# Patient Record
Sex: MALE | Race: WHITE | NOT HISPANIC OR LATINO | URBAN - METROPOLITAN AREA
[De-identification: names, ages, dates, MRNs, and addresses within clinical notes are randomized per-mention and may not be internally consistent; named-entity substitution may affect disease eponyms.]

---

## 2022-03-02 ENCOUNTER — INPATIENT (INPATIENT)
Facility: HOSPITAL | Age: 77
LOS: 7 days | Discharge: NURSING, REHAB OR LONG-TERM CARE | DRG: 319 | End: 2022-03-10
Attending: THORACIC SURGERY (CARDIOTHORACIC VASCULAR SURGERY) | Admitting: THORACIC SURGERY (CARDIOTHORACIC VASCULAR SURGERY)
Payer: MEDICARE

## 2022-03-02 VITALS
SYSTOLIC BLOOD PRESSURE: 134 MMHG | OXYGEN SATURATION: 96 % | DIASTOLIC BLOOD PRESSURE: 81 MMHG | HEIGHT: 72 IN | TEMPERATURE: 97 F | HEART RATE: 72 BPM | RESPIRATION RATE: 18 BRPM | WEIGHT: 208.56 LBS

## 2022-03-02 DIAGNOSIS — M19.09 PRIMARY OSTEOARTHRITIS, OTHER SPECIFIED SITE: ICD-10-CM

## 2022-03-02 DIAGNOSIS — R33.9 RETENTION OF URINE, UNSPECIFIED: ICD-10-CM

## 2022-03-02 DIAGNOSIS — F32.A DEPRESSION, UNSPECIFIED: ICD-10-CM

## 2022-03-02 DIAGNOSIS — Z79.84 LONG TERM (CURRENT) USE OF ORAL HYPOGLYCEMIC DRUGS: ICD-10-CM

## 2022-03-02 DIAGNOSIS — I35.0 NONRHEUMATIC AORTIC (VALVE) STENOSIS: ICD-10-CM

## 2022-03-02 DIAGNOSIS — G11.11 FRIEDREICH ATAXIA: ICD-10-CM

## 2022-03-02 DIAGNOSIS — E78.5 HYPERLIPIDEMIA, UNSPECIFIED: ICD-10-CM

## 2022-03-02 DIAGNOSIS — M48.56XA COLLAPSED VERTEBRA, NOT ELSEWHERE CLASSIFIED, LUMBAR REGION, INITIAL ENCOUNTER FOR FRACTURE: ICD-10-CM

## 2022-03-02 DIAGNOSIS — R47.1 DYSARTHRIA AND ANARTHRIA: ICD-10-CM

## 2022-03-02 DIAGNOSIS — I63.549 CEREBRAL INFARCTION DUE TO UNSPECIFIED OCCLUSION OR STENOSIS OF UNSPECIFIED CEREBELLAR ARTERY: ICD-10-CM

## 2022-03-02 DIAGNOSIS — I25.10 ATHEROSCLEROTIC HEART DISEASE OF NATIVE CORONARY ARTERY WITHOUT ANGINA PECTORIS: ICD-10-CM

## 2022-03-02 DIAGNOSIS — Z79.82 LONG TERM (CURRENT) USE OF ASPIRIN: ICD-10-CM

## 2022-03-02 DIAGNOSIS — Z99.89 DEPENDENCE ON OTHER ENABLING MACHINES AND DEVICES: ICD-10-CM

## 2022-03-02 DIAGNOSIS — M79.81 NONTRAUMATIC HEMATOMA OF SOFT TISSUE: ICD-10-CM

## 2022-03-02 DIAGNOSIS — Z87.891 PERSONAL HISTORY OF NICOTINE DEPENDENCE: ICD-10-CM
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
## 2022-03-02 DIAGNOSIS — Z92.3 PERSONAL HISTORY OF IRRADIATION: ICD-10-CM

## 2022-03-02 DIAGNOSIS — I50.32 CHRONIC DIASTOLIC (CONGESTIVE) HEART FAILURE: ICD-10-CM

## 2022-03-02 DIAGNOSIS — M21.372 FOOT DROP, LEFT FOOT: ICD-10-CM

## 2022-03-02 DIAGNOSIS — I95.81 POSTPROCEDURAL HYPOTENSION: ICD-10-CM

## 2022-03-02 DIAGNOSIS — Z85.46 PERSONAL HISTORY OF MALIGNANT NEOPLASM OF PROSTATE: ICD-10-CM

## 2022-03-02 DIAGNOSIS — R00.1 BRADYCARDIA, UNSPECIFIED: ICD-10-CM

## 2022-03-02 DIAGNOSIS — R29.704 NIHSS SCORE 4: ICD-10-CM

## 2022-03-02 DIAGNOSIS — M48.061 SPINAL STENOSIS, LUMBAR REGION WITHOUT NEUROGENIC CLAUDICATION: ICD-10-CM
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
## 2022-03-02 DIAGNOSIS — I11.0 HYPERTENSIVE HEART DISEASE WITH HEART FAILURE: ICD-10-CM

## 2022-03-02 DIAGNOSIS — E11.9 TYPE 2 DIABETES MELLITUS WITHOUT COMPLICATIONS: ICD-10-CM

## 2022-03-02 DIAGNOSIS — I25.2 OLD MYOCARDIAL INFARCTION: ICD-10-CM

## 2022-03-02 DIAGNOSIS — Z95.5 PRESENCE OF CORONARY ANGIOPLASTY IMPLANT AND GRAFT: ICD-10-CM

## 2022-03-02 DIAGNOSIS — G47.33 OBSTRUCTIVE SLEEP APNEA (ADULT) (PEDIATRIC): ICD-10-CM

## 2022-03-02 DIAGNOSIS — I25.84 CORONARY ATHEROSCLEROSIS DUE TO CALCIFIED CORONARY LESION: ICD-10-CM

## 2022-03-02 DIAGNOSIS — F41.9 ANXIETY DISORDER, UNSPECIFIED: ICD-10-CM

## 2022-03-02 DIAGNOSIS — I97.418 INTRAOPERATIVE HEMORRHAGE AND HEMATOMA OF A CIRCULATORY SYSTEM ORGAN OR STRUCTURE COMPLICATING OTHER CIRCULATORY SYSTEM PROCEDURE: ICD-10-CM

## 2022-03-02 DIAGNOSIS — I72.3 ANEURYSM OF ILIAC ARTERY: ICD-10-CM

## 2022-03-02 LAB
A1C WITH ESTIMATED AVERAGE GLUCOSE RESULT: 6.3 % — HIGH (ref 4–5.6)
ALBUMIN SERPL ELPH-MCNC: 3.9 G/DL — SIGNIFICANT CHANGE UP (ref 3.3–5)
ALP SERPL-CCNC: 62 U/L — SIGNIFICANT CHANGE UP (ref 40–120)
ALT FLD-CCNC: 39 U/L — SIGNIFICANT CHANGE UP (ref 10–45)
ANION GAP SERPL CALC-SCNC: 10 MMOL/L — SIGNIFICANT CHANGE UP (ref 5–17)
APTT BLD: 26.7 SEC — LOW (ref 27.5–35.5)
AST SERPL-CCNC: 40 U/L — SIGNIFICANT CHANGE UP (ref 10–40)
BASOPHILS # BLD AUTO: 0.01 K/UL — SIGNIFICANT CHANGE UP (ref 0–0.2)
BASOPHILS NFR BLD AUTO: 0.3 % — SIGNIFICANT CHANGE UP (ref 0–2)
BILIRUB SERPL-MCNC: 0.6 MG/DL — SIGNIFICANT CHANGE UP (ref 0.2–1.2)
BLD GP AB SCN SERPL QL: NEGATIVE — SIGNIFICANT CHANGE UP
BUN SERPL-MCNC: 22 MG/DL — SIGNIFICANT CHANGE UP (ref 7–23)
CALCIUM SERPL-MCNC: 8.8 MG/DL — SIGNIFICANT CHANGE UP (ref 8.4–10.5)
CHLORIDE SERPL-SCNC: 104 MMOL/L — SIGNIFICANT CHANGE UP (ref 96–108)
CO2 SERPL-SCNC: 25 MMOL/L — SIGNIFICANT CHANGE UP (ref 22–31)
CREAT SERPL-MCNC: 0.95 MG/DL — SIGNIFICANT CHANGE UP (ref 0.5–1.3)
EGFR: 82 ML/MIN/1.73M2 — SIGNIFICANT CHANGE UP
EOSINOPHIL # BLD AUTO: 0.17 K/UL — SIGNIFICANT CHANGE UP (ref 0–0.5)
EOSINOPHIL NFR BLD AUTO: 4.8 % — SIGNIFICANT CHANGE UP (ref 0–6)
ESTIMATED AVERAGE GLUCOSE: 134 MG/DL — HIGH (ref 68–114)
GLUCOSE BLDC GLUCOMTR-MCNC: 142 MG/DL — HIGH (ref 70–99)
GLUCOSE BLDC GLUCOMTR-MCNC: 163 MG/DL — HIGH (ref 70–99)
GLUCOSE BLDC GLUCOMTR-MCNC: 166 MG/DL — HIGH (ref 70–99)
GLUCOSE BLDC GLUCOMTR-MCNC: 175 MG/DL — HIGH (ref 70–99)
GLUCOSE SERPL-MCNC: 162 MG/DL — HIGH (ref 70–99)
HCT VFR BLD CALC: 35.9 % — LOW (ref 39–50)
HGB BLD-MCNC: 12.5 G/DL — LOW (ref 13–17)
IMM GRANULOCYTES NFR BLD AUTO: 0.6 % — SIGNIFICANT CHANGE UP (ref 0–1.5)
INR BLD: 1.09 — SIGNIFICANT CHANGE UP (ref 0.88–1.16)
LYMPHOCYTES # BLD AUTO: 0.78 K/UL — LOW (ref 1–3.3)
LYMPHOCYTES # BLD AUTO: 22 % — SIGNIFICANT CHANGE UP (ref 13–44)
MCHC RBC-ENTMCNC: 31.4 PG — SIGNIFICANT CHANGE UP (ref 27–34)
MCHC RBC-ENTMCNC: 34.8 GM/DL — SIGNIFICANT CHANGE UP (ref 32–36)
MCV RBC AUTO: 90.2 FL — SIGNIFICANT CHANGE UP (ref 80–100)
MONOCYTES # BLD AUTO: 0.41 K/UL — SIGNIFICANT CHANGE UP (ref 0–0.9)
MONOCYTES NFR BLD AUTO: 11.6 % — SIGNIFICANT CHANGE UP (ref 2–14)
NEUTROPHILS # BLD AUTO: 2.15 K/UL — SIGNIFICANT CHANGE UP (ref 1.8–7.4)
NEUTROPHILS NFR BLD AUTO: 60.7 % — SIGNIFICANT CHANGE UP (ref 43–77)
NRBC # BLD: 0 /100 WBCS — SIGNIFICANT CHANGE UP (ref 0–0)
NT-PROBNP SERPL-SCNC: 869 PG/ML — HIGH (ref 0–300)
PLATELET # BLD AUTO: 157 K/UL — SIGNIFICANT CHANGE UP (ref 150–400)
POTASSIUM SERPL-MCNC: 4.3 MMOL/L — SIGNIFICANT CHANGE UP (ref 3.5–5.3)
POTASSIUM SERPL-SCNC: 4.3 MMOL/L — SIGNIFICANT CHANGE UP (ref 3.5–5.3)
PROT SERPL-MCNC: 6 G/DL — SIGNIFICANT CHANGE UP (ref 6–8.3)
PROTHROM AB SERPL-ACNC: 13 SEC — SIGNIFICANT CHANGE UP (ref 10.5–13.4)
RBC # BLD: 3.98 M/UL — LOW (ref 4.2–5.8)
RBC # FLD: 13.2 % — SIGNIFICANT CHANGE UP (ref 10.3–14.5)
RH IG SCN BLD-IMP: POSITIVE — SIGNIFICANT CHANGE UP
RH IG SCN BLD-IMP: POSITIVE — SIGNIFICANT CHANGE UP
SARS-COV-2 RNA SPEC QL NAA+PROBE: SIGNIFICANT CHANGE UP
SODIUM SERPL-SCNC: 139 MMOL/L — SIGNIFICANT CHANGE UP (ref 135–145)
TSH SERPL-MCNC: 2.27 UIU/ML — SIGNIFICANT CHANGE UP (ref 0.27–4.2)
WBC # BLD: 3.54 K/UL — LOW (ref 3.8–10.5)
WBC # FLD AUTO: 3.54 K/UL — LOW (ref 3.8–10.5)

## 2022-03-02 PROCEDURE — 99232 SBSQ HOSP IP/OBS MODERATE 35: CPT

## 2022-03-02 PROCEDURE — 93880 EXTRACRANIAL BILAT STUDY: CPT | Mod: 26

## 2022-03-02 PROCEDURE — 75573 CT HRT C+ STRUX CGEN HRT DS: CPT | Mod: 26

## 2022-03-02 PROCEDURE — 74174 CTA ABD&PLVS W/CONTRAST: CPT | Mod: 26

## 2022-03-02 PROCEDURE — 93306 TTE W/DOPPLER COMPLETE: CPT | Mod: 26

## 2022-03-02 PROCEDURE — 70450 CT HEAD/BRAIN W/O DYE: CPT | Mod: 26

## 2022-03-02 PROCEDURE — 71045 X-RAY EXAM CHEST 1 VIEW: CPT | Mod: 26

## 2022-03-02 PROCEDURE — 93010 ELECTROCARDIOGRAM REPORT: CPT

## 2022-03-02 RX ORDER — TAMSULOSIN HYDROCHLORIDE 0.4 MG/1
0.4 CAPSULE ORAL AT BEDTIME
Refills: 0 | Status: DISCONTINUED | OUTPATIENT
Start: 2022-03-02 | End: 2022-03-04

## 2022-03-02 RX ORDER — INSULIN LISPRO 100/ML
VIAL (ML) SUBCUTANEOUS
Refills: 0 | Status: DISCONTINUED | OUTPATIENT
Start: 2022-03-02 | End: 2022-03-04

## 2022-03-02 RX ORDER — ATORVASTATIN CALCIUM 80 MG/1
80 TABLET, FILM COATED ORAL AT BEDTIME
Refills: 0 | Status: DISCONTINUED | OUTPATIENT
Start: 2022-03-02 | End: 2022-03-04

## 2022-03-02 RX ORDER — SODIUM CHLORIDE 9 MG/ML
1000 INJECTION, SOLUTION INTRAVENOUS
Refills: 0 | Status: DISCONTINUED | OUTPATIENT
Start: 2022-03-02 | End: 2022-03-04

## 2022-03-02 RX ORDER — MIRTAZAPINE 45 MG/1
7.5 TABLET, ORALLY DISINTEGRATING ORAL AT BEDTIME
Refills: 0 | Status: DISCONTINUED | OUTPATIENT
Start: 2022-03-02 | End: 2022-03-04

## 2022-03-02 RX ORDER — GLUCAGON INJECTION, SOLUTION 0.5 MG/.1ML
1 INJECTION, SOLUTION SUBCUTANEOUS ONCE
Refills: 0 | Status: DISCONTINUED | OUTPATIENT
Start: 2022-03-02 | End: 2022-03-04

## 2022-03-02 RX ORDER — DEXTROSE 50 % IN WATER 50 %
12.5 SYRINGE (ML) INTRAVENOUS ONCE
Refills: 0 | Status: DISCONTINUED | OUTPATIENT
Start: 2022-03-02 | End: 2022-03-04

## 2022-03-02 RX ORDER — DEXTROSE 50 % IN WATER 50 %
25 SYRINGE (ML) INTRAVENOUS ONCE
Refills: 0 | Status: DISCONTINUED | OUTPATIENT
Start: 2022-03-02 | End: 2022-03-04

## 2022-03-02 RX ORDER — DULOXETINE HYDROCHLORIDE 30 MG/1
20 CAPSULE, DELAYED RELEASE ORAL DAILY
Refills: 0 | Status: DISCONTINUED | OUTPATIENT
Start: 2022-03-02 | End: 2022-03-04

## 2022-03-02 RX ORDER — DEXTROSE 50 % IN WATER 50 %
15 SYRINGE (ML) INTRAVENOUS ONCE
Refills: 0 | Status: DISCONTINUED | OUTPATIENT
Start: 2022-03-02 | End: 2022-03-04

## 2022-03-02 RX ORDER — SODIUM CHLORIDE 9 MG/ML
1000 INJECTION, SOLUTION INTRAVENOUS
Refills: 0 | Status: DISCONTINUED | OUTPATIENT
Start: 2022-03-02 | End: 2022-03-02

## 2022-03-02 RX ORDER — HEPARIN SODIUM 5000 [USP'U]/ML
5000 INJECTION INTRAVENOUS; SUBCUTANEOUS EVERY 8 HOURS
Refills: 0 | Status: DISCONTINUED | OUTPATIENT
Start: 2022-03-02 | End: 2022-03-04

## 2022-03-02 RX ORDER — ASPIRIN/CALCIUM CARB/MAGNESIUM 324 MG
81 TABLET ORAL DAILY
Refills: 0 | Status: DISCONTINUED | OUTPATIENT
Start: 2022-03-02 | End: 2022-03-04

## 2022-03-02 RX ORDER — ESCITALOPRAM OXALATE 10 MG/1
10 TABLET, FILM COATED ORAL DAILY
Refills: 0 | Status: DISCONTINUED | OUTPATIENT
Start: 2022-03-02 | End: 2022-03-04

## 2022-03-02 RX ORDER — SODIUM CHLORIDE 9 MG/ML
3 INJECTION INTRAMUSCULAR; INTRAVENOUS; SUBCUTANEOUS EVERY 8 HOURS
Refills: 0 | Status: DISCONTINUED | OUTPATIENT
Start: 2022-03-02 | End: 2022-03-04

## 2022-03-02 RX ORDER — PANTOPRAZOLE SODIUM 20 MG/1
40 TABLET, DELAYED RELEASE ORAL
Refills: 0 | Status: DISCONTINUED | OUTPATIENT
Start: 2022-03-02 | End: 2022-03-04

## 2022-03-02 RX ORDER — MIRTAZAPINE 45 MG/1
2 TABLET, ORALLY DISINTEGRATING ORAL
Qty: 0 | Refills: 0 | DISCHARGE

## 2022-03-02 RX ADMIN — TAMSULOSIN HYDROCHLORIDE 0.4 MILLIGRAM(S): 0.4 CAPSULE ORAL at 21:22

## 2022-03-02 RX ADMIN — Medication 2: at 17:39

## 2022-03-02 RX ADMIN — MIRTAZAPINE 7.5 MILLIGRAM(S): 45 TABLET, ORALLY DISINTEGRATING ORAL at 22:30

## 2022-03-02 RX ADMIN — HEPARIN SODIUM 5000 UNIT(S): 5000 INJECTION INTRAVENOUS; SUBCUTANEOUS at 21:22

## 2022-03-02 RX ADMIN — HEPARIN SODIUM 5000 UNIT(S): 5000 INJECTION INTRAVENOUS; SUBCUTANEOUS at 13:42

## 2022-03-02 RX ADMIN — Medication 2: at 08:08

## 2022-03-02 RX ADMIN — Medication 81 MILLIGRAM(S): at 12:47

## 2022-03-02 RX ADMIN — DULOXETINE HYDROCHLORIDE 20 MILLIGRAM(S): 30 CAPSULE, DELAYED RELEASE ORAL at 17:45

## 2022-03-02 RX ADMIN — SODIUM CHLORIDE 75 MILLILITER(S): 9 INJECTION, SOLUTION INTRAVENOUS at 04:30

## 2022-03-02 RX ADMIN — PANTOPRAZOLE SODIUM 40 MILLIGRAM(S): 20 TABLET, DELAYED RELEASE ORAL at 07:01

## 2022-03-02 RX ADMIN — Medication 2: at 21:34

## 2022-03-02 RX ADMIN — SODIUM CHLORIDE 3 MILLILITER(S): 9 INJECTION INTRAMUSCULAR; INTRAVENOUS; SUBCUTANEOUS at 13:30

## 2022-03-02 RX ADMIN — ESCITALOPRAM OXALATE 10 MILLIGRAM(S): 10 TABLET, FILM COATED ORAL at 12:47

## 2022-03-02 RX ADMIN — SODIUM CHLORIDE 3 MILLILITER(S): 9 INJECTION INTRAMUSCULAR; INTRAVENOUS; SUBCUTANEOUS at 06:47

## 2022-03-02 RX ADMIN — HEPARIN SODIUM 5000 UNIT(S): 5000 INJECTION INTRAVENOUS; SUBCUTANEOUS at 07:00

## 2022-03-02 RX ADMIN — ATORVASTATIN CALCIUM 80 MILLIGRAM(S): 80 TABLET, FILM COATED ORAL at 21:22

## 2022-03-02 RX ADMIN — SODIUM CHLORIDE 3 MILLILITER(S): 9 INJECTION INTRAMUSCULAR; INTRAVENOUS; SUBCUTANEOUS at 21:32

## 2022-03-02 NOTE — PHYSICAL THERAPY INITIAL EVALUATION ADULT - PERTINENT HX OF CURRENT PROBLEM, REHAB EVAL
76M  goes to physical therapy regularly and has had nerve blocks for his back pain in the past, who presented to [facility] because while at one of his physical therapy sessions he began to "feel faint" and was told to sit down in a chair that was brought to him, but he didn't make it in time and "fell on the floor and hit his face".. While at [place] he was found to have severe AS and CT Angio.  He was then transferred to [facility]

## 2022-03-02 NOTE — PROGRESS NOTE ADULT - SUBJECTIVE AND OBJECTIVE BOX
Patient discussed on morning rounds with Dr. Tirado    Operation / Date: Possible TAVR 3/4    SUBJECTIVE ASSESSMENT:  76y Male seen and examined at bedside. No events overnight. Feeling well. Denies CP/SOB/N/V/D/dizziness/cough/fever/chills.      Vital Signs Last 24 Hrs  T(C): 36.7 (02 Mar 2022 09:24), Max: 36.7 (02 Mar 2022 09:24)  T(F): 98 (02 Mar 2022 09:24), Max: 98 (02 Mar 2022 09:24)  HR: 72 (02 Mar 2022 12:12) (64 - 72)  BP: 134/80 (02 Mar 2022 12:12) (130/71 - 166/92)  BP(mean): 100 (02 Mar 2022 12:12) (95 - 123)  RR: 16 (02 Mar 2022 06:34) (16 - 18)  SpO2: 97% (02 Mar 2022 12:12) (96% - 97%)  I&O's Detail    01 Mar 2022 07:01  -  02 Mar 2022 07:00  --------------------------------------------------------  IN:    Lactated Ringers: 75 mL  Total IN: 75 mL    OUT:  Total OUT: 0 mL    Total NET: 75 mL          CHEST TUBE:  NO  LIVE DRAIN:  NO  EPICARDIAL WIRES: NO  TIE DOWNS: NO  FREIRE: NO      PHYSICAL EXAM:  GEN: NAD, looks comfortable  Psych: Mood appropriate  Neuro: A&Ox3.  No focal deficits.  Moving all extremities.   HEENT: No obvious abnormalities  CV: S1S2, regular, + systolic murmur RUSB.  No carotid bruits.  No JVD  Lungs: Clear B/L.  No wheezing, rales or rhonchi  ABD: Soft, non-tender, non-distended.  +Bowel sounds  EXT: Warm and well perfused.  No peripheral edema noted  Musculoskeletal: Moving all extremities with normal ROM, no joint swelling  PV: Pedal pulses palpable      LABS:                        12.5   3.54  )-----------( 157      ( 02 Mar 2022 04:08 )             35.9       COUMADIN:  NO    PT/INR - ( 02 Mar 2022 04:08 )   PT: 13.0 sec;   INR: 1.09          PTT - ( 02 Mar 2022 04:08 )  PTT:26.7 sec    03-02    139  |  104  |  22  ----------------------------<  162<H>  4.3   |  25  |  0.95    Ca    8.8      02 Mar 2022 04:08    TPro  6.0  /  Alb  3.9  /  TBili  0.6  /  DBili  x   /  AST  40  /  ALT  39  /  AlkPhos  62  03-02          MEDICATIONS  (STANDING):  aspirin enteric coated 81 milliGRAM(s) Oral daily  atorvastatin 80 milliGRAM(s) Oral at bedtime  dextrose 40% Gel 15 Gram(s) Oral once  dextrose 5%. 1000 milliLiter(s) (50 mL/Hr) IV Continuous <Continuous>  dextrose 5%. 1000 milliLiter(s) (100 mL/Hr) IV Continuous <Continuous>  dextrose 50% Injectable 25 Gram(s) IV Push once  dextrose 50% Injectable 12.5 Gram(s) IV Push once  dextrose 50% Injectable 25 Gram(s) IV Push once  escitalopram 10 milliGRAM(s) Oral daily  glucagon  Injectable 1 milliGRAM(s) IntraMuscular once  heparin   Injectable 5000 Unit(s) SubCutaneous every 8 hours  insulin lispro (ADMELOG) corrective regimen sliding scale   SubCutaneous Before meals and at bedtime  lactated ringers. 1000 milliLiter(s) (75 mL/Hr) IV Continuous <Continuous>  mirtazapine 7.5 milliGRAM(s) Oral at bedtime  pantoprazole    Tablet 40 milliGRAM(s) Oral before breakfast  sodium chloride 0.9% lock flush 3 milliLiter(s) IV Push every 8 hours  tamsulosin 0.4 milliGRAM(s) Oral at bedtime    MEDICATIONS  (PRN):        RADIOLOGY & ADDITIONAL TESTS:  TTE 3/2/22  CONCLUSIONS:   1. Technically difficult study.   2. Normal left ventricular size and systolic function.   3. Normal right ventricular size and systolic function.   4. Normal atria.   5. Severe aortic stenosis. The peak transvalvular velocity is 5.00 m/s,   the mean transvalvular gradient is 65.00 mmHg, and the LVOT/AV velocity   ratio is 0.17.   6. No other significant valvular disease.   7. No evidence of pulmonary hypertension.   8. Trivial pericardial effusion without echocardiographic evidence of   cardiac tamponade physiology.   9. No prior echo is available for comparison.      Carotid dopplers 3/2/22:  Negative for stenosis bilaterally      TAVR CT pending 3/2/22

## 2022-03-02 NOTE — PROGRESS NOTE ADULT - ASSESSMENT
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  Resulted in some bruising to his right face, and a quarter size hematoma right forehead.  Did not hit his head.  CT head (report on chart) at the OSH was negative for any brain bleed.  No other injuries sustained.  While at [place] he was found to have severe AS and CT Angio.  He was then transferred to [facility] for cardiac cath which he had today via right femoral artery and was told that "he may need a stent in his heart".  It showed severe prox and mid-distal LAD, heavily calcified. In addition to his pre-syncope symptoms, he has been feeling SOB/HEIN recently with symptoms arising after walking one flight of stairs or during minimal exertion, class III NYHA symptoms.  No symptoms at rest.  Denies chest pain but sometimes has swelling in his feet. Undergoing pre-operative workup for TAVR later this week.      Neurovascular:   - No delirium or deficits on exam  - Limited mobility due to chronic arthritis  - Episode of syncope - CTH at OSH negative. Will repeat with TAVR studies here  - Continue Escitalopram and Mirtazipine  - Will restart patient's home Cymbalta 20mg    Cardiovascular:   - Severe Aortic Stenosis  - TAVR CTs today, f/u results  - TTE repeated today, EF NL, severe AS (ANEESH 0.55cm2)  - Hemodynamically stable  - Continue ASA 81  - Continue Lipitor 80  - Carotid dopplers from 3/2 negative    Respiratory:   - 02 Sat = 98% on RA.  - If on oxygen wean to RA from for O2 Sat > 93%.  - Encourage C+DB and Use of IS 10x / hr while awake.    GI:   - Stable.  - NPO until TAVR CT done, then will order for diet  - Continue GI PPX with protonix    Renal / :  - BUN/Cr stable at 22/0.95  - Hydrated overnight LR 75cc/hr  - Continue to monitor renal function.  - Monitor I/O's.  - Replete electrolytes PRN    Endocrine:    -A1c 6.3  -TSH 2.27    Hematologic:  - H/H stable at 12.5/35.9 with no obvious signs of bleeding    ID:  - Pt remains afebrile with no elevation in WBC or signs of infection  - Continue to monitor CBC  -Observe for SIRS/Sepsis Syndrome.    Prophylaxis:  - DVT prophylaxis with 5000 SubQ Heparin q8h.  - SCD's    Disposition:  - likely TAVR this week

## 2022-03-02 NOTE — H&P ADULT - NSICDXPASTMEDICALHX_GEN_ALL_CORE_FT
PAST MEDICAL HISTORY:  History of aortic stenosis      PAST MEDICAL HISTORY:  Arthritis     Ataxia, family, spinal     CAD (coronary artery disease)     Diabetes mellitus     History of aortic stenosis     HTN (hypertension)     Hyperlipidemia     LYNDA on CPAP

## 2022-03-02 NOTE — H&P ADULT - NSHPPHYSICALEXAM_GEN_ALL_CORE
GEN: NAD, looks comfortable  Psych: Mood appropriate  Neuro: A&Ox3.  No focal deficits.  Moving all extremities.   HEENT: No obvious abnormalities  CV: S1S2, regular, no murmurs appreciated.  No carotid bruits.  No JVD  Lungs: Clear B/L.  No wheezing, rales or rhonchi  ABD: Soft, non-tender, non-distended.  +Bowel sounds  EXT: Warm and well perfused.  No peripheral edema noted  Musculoskeletal: Moving all extremities with normal ROM, no joint swelling  PV: Pedal pulses palpable GEN: NAD, looks comfortable, pleasant male.  Good historian.   Psych: Mood appropriate  Neuro: A&Ox3.  No focal deficits.  Moving all extremities.   HEENT: Right forehead with ~quarter size hematoma, protruding.  Soft.  Also, some bruising around his right eye.  Vision intact.    CV: S1S2, regular, +PAM heard throughout precordium, best at 2nd ICS.   No JVD  Lungs: Clear B/L.  No wheezing, rales or rhonchi  ABD: Soft, non-tender, non-distended.  +Bowel sounds  EXT: Warm and well perfused.  No peripheral edema noted  Musculoskeletal: Moving all extremities,  limited due to arthritis  PV: Pedal pulses palpable

## 2022-03-02 NOTE — PHYSICAL THERAPY INITIAL EVALUATION ADULT - ADDITIONAL COMMENTS
independent prior to arrival, 1 fall in past year that resulted to current admission, house, 1 flight to enter, 1 flight inside to main bedroom, goes to physical therapy outpatient for cerebellar ataxia Airway patent, Nasal mucosa clear. Mouth with normal mucosa. Throat has no vesicles, no oropharyngeal exudates and uvula is midline.

## 2022-03-02 NOTE — H&P ADULT - HISTORY OF PRESENT ILLNESS
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  Resulted in some bruising to his right face, and a quarter size hematoma right forehead.  Did not hit his head.  CT head (report on chart) at the OSH was negative for any brain bleed.  No other injuries sustained.  While at [place] he was found to have severe AS and CT Angio.  He was then transferred to [facility] for cardiac cath which he had today via right femoral artery and was told that "he may need a stent in his heart".  It showed severe prox and mid-distal LAD, heavily calcified. In addition to his pre-syncope symptoms, he has been feeling SOB/HEIN recently with symptoms arising after walking one flight of stairs or during minimal exertion, class III NYHA symptoms.  No symptoms at rest.  Denies chest pain but sometimes has swelling in his feet.  Denies CP/SOB/N/V/D/dizziness/cough/fever/chills.

## 2022-03-02 NOTE — H&P ADULT - NSHPSOCIALHISTORY_GEN_ALL_CORE
Lives with  Assist device:   Tobacco  ETOH  Drug use Lives with wife  Assist device:  walker  Tobacco ex smoker, quit 1969.  Smoked 1 PPDx 5 years  ETOH 1 wine per week  Drug use  Denies  Independent with ADLs.

## 2022-03-02 NOTE — PHYSICAL THERAPY INITIAL EVALUATION ADULT - GAIT DEVIATIONS NOTED, PT EVAL
wide KARIN, R foot drop, R foot ER, ataxic gait, dec ability to navigate around obstacles on L side/increased time in double stance/decreased step length/decreased weight-shifting ability wide KARIN, R foot drop, R foot ER, ataxic gait, dec ability to navigate around obstacles on L side, SOB, desat to 87% on RA, freq standing breaks x 30 sec x 3/increased time in double stance/decreased step length/decreased weight-shifting ability

## 2022-03-02 NOTE — H&P ADULT - NSHPREVIEWOFSYSTEMS_GEN_ALL_CORE
Review of Systems  CONSTITUTIONAL:  Denies Fevers / chills, sweats, fatigue, weight loss, weight gain                                      NEURO:  Denies parethesias, seizures, syncope, confusion                                                                                EYES:  Denies Blurry vision, discharge, pain, loss of vision                                                                                    ENMT:  Denies Difficulty hearing, vertigo, dysphagia, epistaxis, recent dental work                                       CV:  Denies Chest pain, palpitations, HEIN, orthopnea                                                                                          RESPIRATORY:  Denies Wheezing, SOB, cough / sputum, hemoptysis                                                                GI:  Denies Nausea, vomiting, diarrhea, constipation, melena, difficulty swallowing                                               : Denies Hematuria, dysuria, urgency, incontinence                                                                                         MUSCULOSKELETAL:  Denies arthritis, joint swelling, muscle weakness                                                             SKIN/BREAST:  Denies rash, itching, hair loss, masses                                                                                            PSYCH:  Denies depression, anxiety, suicidal ideation                                                                                               HEME/LYMPH:  Denies bruises easily, enlarged lymph nodes, tender lymph nodes                                        ENDOCRINE:  Denies cold intolerance, heat intolerance, polydipsia Review of Systems  CONSTITUTIONAL:  "feeling faint" Denies Fevers / chills, sweats, fatigue, weight loss, weight gain                                      NEURO:  Denies parethesias, seizures, syncope, confusion                                                                                EYES:  Denies Blurry vision, discharge, pain, loss of vision                                                                                    ENMT:  Denies Difficulty hearing, vertigo, dysphagia, epistaxis, recent dental work                                       CV:  +HEIN +pedal edema.   Denies Chest pain, palpitations, orthopnea                                                                                          RESPIRATORY:  Denies Wheezing, SOB, cough / sputum, hemoptysis                                                                GI:  Denies Nausea, vomiting, diarrhea, constipation, melena, difficulty swallowing                                               : Denies Hematuria, dysuria, urgency, incontinence                                                                                         MUSCULOSKELETAL:  Denies arthritis, joint swelling, muscle weakness                                                             SKIN/BREAST:  Denies rash, itching, hair loss, masses                                                                                            PSYCH:  Denies depression, anxiety, suicidal ideation                                                                                               HEME/LYMPH:  Denies bruises easily, enlarged lymph nodes, tender lymph nodes                                        ENDOCRINE:  Denies cold intolerance, heat intolerance, polydipsia

## 2022-03-02 NOTE — H&P ADULT - ASSESSMENT
Plan  Problem 1.  Aortic Stenosis.  Admit to [facility] under Dr. Tirado.  Plan is for SAVR vs. TAVR eval.  F/U admission labs, CXR, EKG, T&S.  Likely will get TAVR CTA scans, pending creatinine and other labs.  If candidate for procedure will get PFTs and Carotid dopplers.    Problem 2.    Problem 3.        Dispo: [facility], tele This is a 77 y/o male former smoker, with PMHx of HTN, HLD, NIDDM, "mild" MI in the past requiring a stent (diagonal branch), spinal ataxia (uses a walker at baseline), prostate CA s/p radiation, in remission x 8-9 years, LYNDA on CPAP at home with good compliance, chronic arthritis with neck and back pain- goes to physical therapy regularly and has had nerve blocks for his back pain in the past, who presented to [facility] because while at one of his physical therapy sessions he began to "feel faint" and was told to sit down in a chair that was brought to him, but he didn't make it in time and "fell on the floor and hit his face".  Resulted in some bruising to his right face, and a quarter size hematoma right forehead.  Did not hit his head.  CT head (report on chart) at the OSH was negative for any brain bleed.  No other injuries sustained.  While at [place] he was found to have severe AS and CT Angio.  He was then transferred to [facility] for cardiac cath which he had today via right femoral artery and was told that "he may need a stent in his heart".  It showed severe prox and mid-distal LAD, heavily calcified. In addition to his pre-syncope symptoms, he has been feeling SOB/HEIN recently with symptoms arising after walking one flight of stairs or during minimal exertion, class III NYHA symptoms.  No symptoms at rest.  Denies chest pain but sometimes has swelling in his feet.      Plan  Problem 1.  Aortic Stenosis.  Admit to [facility] under Dr. Tirado.  Plan is for SAVR vs. TAVR eval.  F/U admission labs, CXR, EKG, T&S.  Likely will get TAVR CTA scans, pending creatinine and other labs.  If candidate for procedure will get PFTs and Carotid dopplers.  NPO for now.  Pt appears dry, starting LR at 75cc/hr.  Likely repeat echo tomorrow.    Problem 2. HTN.  Home meds as tolerated.     Problem 3.  HLD.  Statin    Problem 4.  LYNDA.  Home CPAP.      Problem 5.  DM.  ISS while in house.     PT consult.        Dispo: [facility], tele

## 2022-03-03 ENCOUNTER — TRANSCRIPTION ENCOUNTER (OUTPATIENT)
Age: 77
End: 2022-03-03

## 2022-03-03 PROBLEM — G47.33 OBSTRUCTIVE SLEEP APNEA (ADULT) (PEDIATRIC): Chronic | Status: ACTIVE | Noted: 2022-03-02

## 2022-03-03 PROBLEM — G11.11 FRIEDREICH ATAXIA: Chronic | Status: ACTIVE | Noted: 2022-03-02

## 2022-03-03 PROBLEM — I10 ESSENTIAL (PRIMARY) HYPERTENSION: Chronic | Status: ACTIVE | Noted: 2022-03-02

## 2022-03-03 PROBLEM — E11.9 TYPE 2 DIABETES MELLITUS WITHOUT COMPLICATIONS: Chronic | Status: ACTIVE | Noted: 2022-03-02

## 2022-03-03 PROBLEM — M19.90 UNSPECIFIED OSTEOARTHRITIS, UNSPECIFIED SITE: Chronic | Status: ACTIVE | Noted: 2022-03-02

## 2022-03-03 PROBLEM — Z86.79 PERSONAL HISTORY OF OTHER DISEASES OF THE CIRCULATORY SYSTEM: Chronic | Status: ACTIVE | Noted: 2022-03-02

## 2022-03-03 PROBLEM — E78.5 HYPERLIPIDEMIA, UNSPECIFIED: Chronic | Status: ACTIVE | Noted: 2022-03-02

## 2022-03-03 PROBLEM — I25.10 ATHEROSCLEROTIC HEART DISEASE OF NATIVE CORONARY ARTERY WITHOUT ANGINA PECTORIS: Chronic | Status: ACTIVE | Noted: 2022-03-02

## 2022-03-03 LAB
A1C WITH ESTIMATED AVERAGE GLUCOSE RESULT: 6.4 % — HIGH (ref 4–5.6)
ALBUMIN SERPL ELPH-MCNC: 3.8 G/DL — SIGNIFICANT CHANGE UP (ref 3.3–5)
ALP SERPL-CCNC: 55 U/L — SIGNIFICANT CHANGE UP (ref 40–120)
ALT FLD-CCNC: 29 U/L — SIGNIFICANT CHANGE UP (ref 10–45)
ANION GAP SERPL CALC-SCNC: 11 MMOL/L — SIGNIFICANT CHANGE UP (ref 5–17)
APPEARANCE UR: CLEAR — SIGNIFICANT CHANGE UP
AST SERPL-CCNC: 28 U/L — SIGNIFICANT CHANGE UP (ref 10–40)
BASOPHILS # BLD AUTO: 0.01 K/UL — SIGNIFICANT CHANGE UP (ref 0–0.2)
BASOPHILS NFR BLD AUTO: 0.3 % — SIGNIFICANT CHANGE UP (ref 0–2)
BILIRUB SERPL-MCNC: 0.7 MG/DL — SIGNIFICANT CHANGE UP (ref 0.2–1.2)
BILIRUB UR-MCNC: NEGATIVE — SIGNIFICANT CHANGE UP
BUN SERPL-MCNC: 19 MG/DL — SIGNIFICANT CHANGE UP (ref 7–23)
CALCIUM SERPL-MCNC: 8.7 MG/DL — SIGNIFICANT CHANGE UP (ref 8.4–10.5)
CHLORIDE SERPL-SCNC: 105 MMOL/L — SIGNIFICANT CHANGE UP (ref 96–108)
CO2 SERPL-SCNC: 24 MMOL/L — SIGNIFICANT CHANGE UP (ref 22–31)
COLOR SPEC: YELLOW — SIGNIFICANT CHANGE UP
CREAT SERPL-MCNC: 0.92 MG/DL — SIGNIFICANT CHANGE UP (ref 0.5–1.3)
DIFF PNL FLD: NEGATIVE — SIGNIFICANT CHANGE UP
EGFR: 86 ML/MIN/1.73M2 — SIGNIFICANT CHANGE UP
EOSINOPHIL # BLD AUTO: 0.17 K/UL — SIGNIFICANT CHANGE UP (ref 0–0.5)
EOSINOPHIL NFR BLD AUTO: 5 % — SIGNIFICANT CHANGE UP (ref 0–6)
ESTIMATED AVERAGE GLUCOSE: 137 MG/DL — HIGH (ref 68–114)
GLUCOSE BLDC GLUCOMTR-MCNC: 106 MG/DL — HIGH (ref 70–99)
GLUCOSE BLDC GLUCOMTR-MCNC: 136 MG/DL — HIGH (ref 70–99)
GLUCOSE BLDC GLUCOMTR-MCNC: 163 MG/DL — HIGH (ref 70–99)
GLUCOSE BLDC GLUCOMTR-MCNC: 246 MG/DL — HIGH (ref 70–99)
GLUCOSE SERPL-MCNC: 107 MG/DL — HIGH (ref 70–99)
GLUCOSE UR QL: NEGATIVE — SIGNIFICANT CHANGE UP
HCT VFR BLD CALC: 34.8 % — LOW (ref 39–50)
HGB BLD-MCNC: 11.5 G/DL — LOW (ref 13–17)
IMM GRANULOCYTES NFR BLD AUTO: 0.6 % — SIGNIFICANT CHANGE UP (ref 0–1.5)
KETONES UR-MCNC: ABNORMAL MG/DL
LEUKOCYTE ESTERASE UR-ACNC: NEGATIVE — SIGNIFICANT CHANGE UP
LYMPHOCYTES # BLD AUTO: 0.96 K/UL — LOW (ref 1–3.3)
LYMPHOCYTES # BLD AUTO: 28.4 % — SIGNIFICANT CHANGE UP (ref 13–44)
MAGNESIUM SERPL-MCNC: 2.1 MG/DL — SIGNIFICANT CHANGE UP (ref 1.6–2.6)
MCHC RBC-ENTMCNC: 30.3 PG — SIGNIFICANT CHANGE UP (ref 27–34)
MCHC RBC-ENTMCNC: 33 GM/DL — SIGNIFICANT CHANGE UP (ref 32–36)
MCV RBC AUTO: 91.6 FL — SIGNIFICANT CHANGE UP (ref 80–100)
MONOCYTES # BLD AUTO: 0.5 K/UL — SIGNIFICANT CHANGE UP (ref 0–0.9)
MONOCYTES NFR BLD AUTO: 14.8 % — HIGH (ref 2–14)
NEUTROPHILS # BLD AUTO: 1.72 K/UL — LOW (ref 1.8–7.4)
NEUTROPHILS NFR BLD AUTO: 50.9 % — SIGNIFICANT CHANGE UP (ref 43–77)
NITRITE UR-MCNC: NEGATIVE — SIGNIFICANT CHANGE UP
NRBC # BLD: 0 /100 WBCS — SIGNIFICANT CHANGE UP (ref 0–0)
PH UR: 5.5 — SIGNIFICANT CHANGE UP (ref 5–8)
PLATELET # BLD AUTO: 140 K/UL — LOW (ref 150–400)
POTASSIUM SERPL-MCNC: 3.9 MMOL/L — SIGNIFICANT CHANGE UP (ref 3.5–5.3)
POTASSIUM SERPL-SCNC: 3.9 MMOL/L — SIGNIFICANT CHANGE UP (ref 3.5–5.3)
PROT SERPL-MCNC: 5.8 G/DL — LOW (ref 6–8.3)
PROT UR-MCNC: NEGATIVE MG/DL — SIGNIFICANT CHANGE UP
RBC # BLD: 3.8 M/UL — LOW (ref 4.2–5.8)
RBC # FLD: 13.2 % — SIGNIFICANT CHANGE UP (ref 10.3–14.5)
SARS-COV-2 RNA SPEC QL NAA+PROBE: SIGNIFICANT CHANGE UP
SODIUM SERPL-SCNC: 140 MMOL/L — SIGNIFICANT CHANGE UP (ref 135–145)
SP GR SPEC: 1.02 — SIGNIFICANT CHANGE UP (ref 1–1.03)
UROBILINOGEN FLD QL: 4 E.U./DL
WBC # BLD: 3.38 K/UL — LOW (ref 3.8–10.5)
WBC # FLD AUTO: 3.38 K/UL — LOW (ref 3.8–10.5)

## 2022-03-03 PROCEDURE — 94010 BREATHING CAPACITY TEST: CPT | Mod: 26

## 2022-03-03 RX ORDER — CHLORHEXIDINE GLUCONATE 213 G/1000ML
1 SOLUTION TOPICAL ONCE
Refills: 0 | Status: COMPLETED | OUTPATIENT
Start: 2022-03-03 | End: 2022-03-03

## 2022-03-03 RX ORDER — CHLORHEXIDINE GLUCONATE 213 G/1000ML
15 SOLUTION TOPICAL ONCE
Refills: 0 | Status: COMPLETED | OUTPATIENT
Start: 2022-03-03 | End: 2022-03-04

## 2022-03-03 RX ORDER — CHLORHEXIDINE GLUCONATE 213 G/1000ML
1 SOLUTION TOPICAL ONCE
Refills: 0 | Status: COMPLETED | OUTPATIENT
Start: 2022-03-04 | End: 2022-03-04

## 2022-03-03 RX ORDER — POTASSIUM CHLORIDE 20 MEQ
40 PACKET (EA) ORAL ONCE
Refills: 0 | Status: COMPLETED | OUTPATIENT
Start: 2022-03-03 | End: 2022-03-03

## 2022-03-03 RX ORDER — POTASSIUM CHLORIDE 20 MEQ
20 PACKET (EA) ORAL ONCE
Refills: 0 | Status: DISCONTINUED | OUTPATIENT
Start: 2022-03-03 | End: 2022-03-03

## 2022-03-03 RX ADMIN — HEPARIN SODIUM 5000 UNIT(S): 5000 INJECTION INTRAVENOUS; SUBCUTANEOUS at 21:46

## 2022-03-03 RX ADMIN — DULOXETINE HYDROCHLORIDE 20 MILLIGRAM(S): 30 CAPSULE, DELAYED RELEASE ORAL at 11:46

## 2022-03-03 RX ADMIN — ATORVASTATIN CALCIUM 80 MILLIGRAM(S): 80 TABLET, FILM COATED ORAL at 21:46

## 2022-03-03 RX ADMIN — SODIUM CHLORIDE 3 MILLILITER(S): 9 INJECTION INTRAMUSCULAR; INTRAVENOUS; SUBCUTANEOUS at 13:12

## 2022-03-03 RX ADMIN — Medication 81 MILLIGRAM(S): at 11:46

## 2022-03-03 RX ADMIN — PANTOPRAZOLE SODIUM 40 MILLIGRAM(S): 20 TABLET, DELAYED RELEASE ORAL at 06:30

## 2022-03-03 RX ADMIN — MIRTAZAPINE 7.5 MILLIGRAM(S): 45 TABLET, ORALLY DISINTEGRATING ORAL at 21:45

## 2022-03-03 RX ADMIN — Medication 2: at 21:45

## 2022-03-03 RX ADMIN — CHLORHEXIDINE GLUCONATE 1 APPLICATION(S): 213 SOLUTION TOPICAL at 20:31

## 2022-03-03 RX ADMIN — SODIUM CHLORIDE 3 MILLILITER(S): 9 INJECTION INTRAMUSCULAR; INTRAVENOUS; SUBCUTANEOUS at 06:31

## 2022-03-03 RX ADMIN — HEPARIN SODIUM 5000 UNIT(S): 5000 INJECTION INTRAVENOUS; SUBCUTANEOUS at 14:08

## 2022-03-03 RX ADMIN — Medication 40 MILLIEQUIVALENT(S): at 11:46

## 2022-03-03 RX ADMIN — HEPARIN SODIUM 5000 UNIT(S): 5000 INJECTION INTRAVENOUS; SUBCUTANEOUS at 06:30

## 2022-03-03 RX ADMIN — SODIUM CHLORIDE 3 MILLILITER(S): 9 INJECTION INTRAMUSCULAR; INTRAVENOUS; SUBCUTANEOUS at 21:48

## 2022-03-03 RX ADMIN — TAMSULOSIN HYDROCHLORIDE 0.4 MILLIGRAM(S): 0.4 CAPSULE ORAL at 21:46

## 2022-03-03 RX ADMIN — ESCITALOPRAM OXALATE 10 MILLIGRAM(S): 10 TABLET, FILM COATED ORAL at 11:46

## 2022-03-03 RX ADMIN — Medication 4: at 11:46

## 2022-03-03 NOTE — PROGRESS NOTE ADULT - ASSESSMENT
76y Male former smoker, with PMHx of HTN, HLD, NIDDM, "mild" MI in the past requiring a stent (diagonal branch), spinal ataxia (uses a walker at baseline), prostate CA s/p radiation, in remission x 8-9 years, LYNDA on CPAP at home with good compliance, chronic arthritis with neck and back pain- goes to physical therapy regularly and has had nerve blocks for his back pain in the past, who presented to [facility] because while at one of his physical therapy sessions he began to "feel faint" and was told to sit down in a chair that was brought to him, but he didn't make it in time and "fell on the floor and hit his face".  Resulted in some bruising to his right face, and a quarter size hematoma right forehead.  Did not hit his head.  CT head (report on chart) at the OSH was negative for any brain bleed.  No other injuries sustained.  While at [place] he was found to have severe AS and CT Angio.  He was then transferred to [facility] for cardiac cath which he had today via right femoral artery and was told that "he may need a stent in his heart".  It showed severe prox and mid-distal LAD, heavily calcified. In addition to his pre-syncope symptoms, he has been feeling SOB/HEIN recently with symptoms arising after walking one flight of stairs or during minimal exertion, class III NYHA symptoms.  No symptoms at rest.  Denies chest pain but sometimes has swelling in his feet. Undergoing pre-operative workup for BAV/PCI tomorrow. PST completed.    Plan:  NPO after midnight  Consent in chart  BAV/PCI tomorrow  COVID sent/results pending  Presurgical labs and scrub

## 2022-03-03 NOTE — PROGRESS NOTE ADULT - SUBJECTIVE AND OBJECTIVE BOX
Patient discussed on morning rounds with Dr. Tirado      Operation / Date: preop BAV/PCI tomorrow    SUBJECTIVE ASSESSMENT:  76y Male seen and examined at bedside. No events overnight. Patient preop for bAV/PCI tomorrow. Patient denies cp, sob, palpitations, fevers, n/v/d/c.        Vital Signs Last 24 Hrs  T(C): 36.9 (03 Mar 2022 17:31), Max: 37.1 (02 Mar 2022 22:02)  T(F): 98.4 (03 Mar 2022 17:31), Max: 98.8 (02 Mar 2022 22:02)  HR: 75 (03 Mar 2022 17:47) (67 - 93)  BP: 138/77 (03 Mar 2022 17:47) (93/56 - 164/88)  BP(mean): 102 (03 Mar 2022 17:47) (68 - 116)  RR: 18 (03 Mar 2022 17:47) (16 - 24)  SpO2: 96% (03 Mar 2022 17:47) (93% - 100%)  I&O's Detail    02 Mar 2022 07:01  -  03 Mar 2022 07:00  --------------------------------------------------------  IN:    Oral Fluid: 320 mL  Total IN: 320 mL    OUT:    Voided (mL): 800 mL  Total OUT: 800 mL    Total NET: -480 mL      03 Mar 2022 07:01  -  03 Mar 2022 19:11  --------------------------------------------------------  IN:    Oral Fluid: 700 mL  Total IN: 700 mL    OUT:    Voided (mL): 800 mL  Total OUT: 800 mL    Total NET: -100 mL    CHEST TUBE:  No.   LIVE DRAIN:  No.  EPICARDIAL WIRES: No.  TIE DOWNS: No.  FREIRE: No.    PHYSICAL EXAM:  GEN: NAD, looks comfortable  Psych: Mood appropriate  Neuro: A&Ox3.  No focal deficits.  Moving all extremities.   HEENT: No obvious abnormalities  CV: S1S2, regular, no murmurs appreciated.  No carotid bruits.  No JVD  Lungs: Clear B/L.  No wheezing, rales or rhonchi  ABD: Soft, non-tender, non-distended.  +Bowel sounds  EXT: Warm and well perfused.  No peripheral edema noted  Musculoskeletal: Moving all extremities with normal ROM, no joint swelling  PV: Pedal pulses palpable      LABS:                        11.5   3.38  )-----------( 140      ( 03 Mar 2022 07:09 )             34.8       COUMADIN:  Yes/No. REASON: .    PT/INR - ( 02 Mar 2022 04:08 )   PT: 13.0 sec;   INR: 1.09          PTT - ( 02 Mar 2022 04:08 )  PTT:26.7 sec        140  |  105  |  19  ----------------------------<  107<H>  3.9   |  24  |  0.92    Ca    8.7      03 Mar 2022 07:09  Mg     2.1     03-03    TPro  5.8<L>  /  Alb  3.8  /  TBili  0.7  /  DBili  x   /  AST  28  /  ALT  29  /  AlkPhos  55  03-03      Urinalysis Basic - ( 03 Mar 2022 15:19 )    Color: Yellow / Appearance: Clear / S.020 / pH: x  Gluc: x / Ketone: Trace mg/dL  / Bili: Negative / Urobili: 4.0 E.U./dL   Blood: x / Protein: NEGATIVE mg/dL / Nitrite: NEGATIVE   Leuk Esterase: NEGATIVE / RBC: x / WBC x   Sq Epi: x / Non Sq Epi: x / Bacteria: x      MEDICATIONS  (STANDING):  aspirin enteric coated 81 milliGRAM(s) Oral daily  atorvastatin 80 milliGRAM(s) Oral at bedtime  chlorhexidine 0.12% Liquid 15 milliLiter(s) Swish and Spit once  chlorhexidine 4% Liquid 1 Application(s) Topical once  chlorhexidine 4% Liquid 1 Application(s) Topical once  dextrose 40% Gel 15 Gram(s) Oral once  dextrose 5%. 1000 milliLiter(s) (50 mL/Hr) IV Continuous <Continuous>  dextrose 5%. 1000 milliLiter(s) (100 mL/Hr) IV Continuous <Continuous>  dextrose 50% Injectable 25 Gram(s) IV Push once  dextrose 50% Injectable 12.5 Gram(s) IV Push once  dextrose 50% Injectable 25 Gram(s) IV Push once  DULoxetine 20 milliGRAM(s) Oral daily  escitalopram 10 milliGRAM(s) Oral daily  glucagon  Injectable 1 milliGRAM(s) IntraMuscular once  heparin   Injectable 5000 Unit(s) SubCutaneous every 8 hours  insulin lispro (ADMELOG) corrective regimen sliding scale   SubCutaneous Before meals and at bedtime  mirtazapine 7.5 milliGRAM(s) Oral at bedtime  pantoprazole    Tablet 40 milliGRAM(s) Oral before breakfast  sodium chloride 0.9% lock flush 3 milliLiter(s) IV Push every 8 hours  tamsulosin 0.4 milliGRAM(s) Oral at bedtime    MEDICATIONS  (PRN):        RADIOLOGY & ADDITIONAL TESTS:     Patient discussed on morning rounds with Dr. Tirado      Operation / Date: preop BAV/PCI tomorrow                                                                                                            Surgeon: Dr. Tirado/Dr. 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  Resulted in some bruising to his right face, and a quarter size hematoma right forehead.  Did not hit his head.  CT head (report on chart) at the OSH was negative for any brain bleed.  No other injuries sustained.  While at [place] he was found to have severe AS and CT Angio.  He was then transferred to [facility] for cardiac cath which he had today via right femoral artery and was told that "he may need a stent in his heart".  It showed severe prox and mid-distal LAD, heavily calcified. In addition to his pre-syncope symptoms, he has been feeling SOB/HEIN recently with symptoms arising after walking one flight of stairs or during minimal exertion, class III NYHA symptoms.  No symptoms at rest.  Denies chest pain but sometimes has swelling in his feet. Undergoing pre-operative workup for BAV/PCI tomorrow. PST completed.    PAST MEDICAL & SURGICAL HISTORY:  History of aortic stenosis    HTN (hypertension)    Hyperlipidemia    Diabetes mellitus    LYNDA on CPAP    Arthritis    CAD (coronary artery disease)    Ataxia, family, spinal        No Known Allergies      Physical Exam  T(C): 36.9 (22 @ 17:31), Max: 37.1 (22 @ 22:02)  HR: 88 (22 @ 19:20) (67 - 93)  BP: 141/91 (22 @ 19:20) (93/56 - 164/88)  RR: 32 (22 @ 19:20) (16 - 32)  SpO2: 91% (-22 @ 19:20) (91% - 100%)  GEN: NAD, looks comfortable  Psych: Mood appropriate  Neuro: A&Ox3.  No focal deficits.  Moving all extremities.   HEENT: No obvious abnormalities  CV: S1S2, regular, + systolic murmur RUSB.  No carotid bruits.  No JVD  Lungs: Clear B/L.  No wheezing, rales or rhonchi  ABD: Soft, non-tender, non-distended.  +Bowel sounds  EXT: Warm and well perfused.  No peripheral edema noted  Musculoskeletal: Moving all extremities with normal ROM, no joint swelling  PV: Pedal pulses palpable      MEDICATIONS  (STANDING):  aspirin enteric coated 81 milliGRAM(s) Oral daily  atorvastatin 80 milliGRAM(s) Oral at bedtime  chlorhexidine 0.12% Liquid 15 milliLiter(s) Swish and Spit once  chlorhexidine 4% Liquid 1 Application(s) Topical once  chlorhexidine 4% Liquid 1 Application(s) Topical once  dextrose 40% Gel 15 Gram(s) Oral once  dextrose 5%. 1000 milliLiter(s) (50 mL/Hr) IV Continuous <Continuous>  dextrose 5%. 1000 milliLiter(s) (100 mL/Hr) IV Continuous <Continuous>  dextrose 50% Injectable 25 Gram(s) IV Push once  dextrose 50% Injectable 12.5 Gram(s) IV Push once  dextrose 50% Injectable 25 Gram(s) IV Push once  DULoxetine 20 milliGRAM(s) Oral daily  escitalopram 10 milliGRAM(s) Oral daily  glucagon  Injectable 1 milliGRAM(s) IntraMuscular once  heparin   Injectable 5000 Unit(s) SubCutaneous every 8 hours  insulin lispro (ADMELOG) corrective regimen sliding scale   SubCutaneous Before meals and at bedtime  mirtazapine 7.5 milliGRAM(s) Oral at bedtime  pantoprazole    Tablet 40 milliGRAM(s) Oral before breakfast  sodium chloride 0.9% lock flush 3 milliLiter(s) IV Push every 8 hours  tamsulosin 0.4 milliGRAM(s) Oral at bedtime    MEDICATIONS  (PRN):      Labs:                        11.5   3.38  )-----------( 140      ( 03 Mar 2022 07:09 )             34.8         140  |  105  |  19  ----------------------------<  107<H>  3.9   |  24  |  0.92    Ca    8.7      03 Mar 2022 07:09  Mg     2.1     03-03    TPro  5.8<L>  /  Alb  3.8  /  TBili  0.7  /  DBili  x   /  AST  28  /  ALT  29  /  AlkPhos  55  03-03    PT/INR - ( 02 Mar 2022 04:08 )   PT: 13.0 sec;   INR: 1.09          PTT - ( 02 Mar 2022 04:08 )  PTT:26.7 sec  Urinalysis Basic - ( 03 Mar 2022 15:19 )    Color: Yellow / Appearance: Clear / S.020 / pH: x  Gluc: x / Ketone: Trace mg/dL  / Bili: Negative / Urobili: 4.0 E.U./dL   Blood: x / Protein: NEGATIVE mg/dL / Nitrite: NEGATIVE   Leuk Esterase: NEGATIVE / RBC: x / WBC x   Sq Epi: x / Non Sq Epi: x / Bacteria: x      ABO Interpretation: A (22 @ 04:45)    Hgb A1C:    EKG: in chart    CXR: stable    CT Scans:  < from: CT Angio Abdomen and Pelvis w/ IV Cont (22 @ 15:18) >  1. Heavily calcified aortic valve, a finding often associated with aortic   stenosis.    2. Distended bladder with thickened wall. The wall thickening could be   secondary to chronic bladder outlet obstruction given the presence of an   enlarged prostate.    3. Mild gallbladder distention, of uncertain significance. Clinical   correlation recommended. If indicated, further evaluation could be   performed with right upper quadrant ultrasound.    < end of copied text >      Echo:  < from: TTE Echo Complete w/o Contrast w/ Doppler (22 @ 09:39) >  -----  CONCLUSIONS:     1. Technically difficult study.   2. Normal left ventricular size and systolic function.   3. Normal right ventricular size and systolic function.   4. Normal atria.   5. Severe aortic stenosis. The peak transvalvular velocity is 5.00 m/s,   the mean transvalvular gradient is 65.00 mmHg, and the LVOT/AV velocity   ratio is 0.17.   6. No other significant valvular disease.   7. No evidence of pulmonary hypertension.   8. Trivial pericardial effusion without echocardiographic evidence of   cardiac tamponade physiology.   9. No prior echo is available for comparison.    < end of copied text >      PFT's: in chart    Carotid Duplex:  < from: US Duplex Carotid Arteries Complete, Bilateral (22 @ 11:30) >   No significant hemodynamic stenosis of either carotid artery.   No significant atherosclerotic plaque formation.    Measurement of carotid stenosis is based on velocity parameters that   correlate the residual internal carotid diameter with that of the more   distal vessel in accordance with a method such as the [place] American   Symptomatic Carotid Endarterectomy Trial (NASCET).    < end of copied text >      Consult in Chart?  YES   Consent in Chart? YES    Pre-op Orders Placed? YES    Blood Prodeucts Ordered? YES    NPO ordered? YES

## 2022-03-04 ENCOUNTER — APPOINTMENT (OUTPATIENT)
Dept: CARDIOTHORACIC SURGERY | Facility: HOSPITAL | Age: 77
End: 2022-03-04

## 2022-03-04 PROBLEM — Z00.00 ENCOUNTER FOR PREVENTIVE HEALTH EXAMINATION: Status: ACTIVE | Noted: 2022-03-04

## 2022-03-04 LAB
ALBUMIN SERPL ELPH-MCNC: 3.3 G/DL — SIGNIFICANT CHANGE UP (ref 3.3–5)
ALBUMIN SERPL ELPH-MCNC: 3.5 G/DL — SIGNIFICANT CHANGE UP (ref 3.3–5)
ALBUMIN SERPL ELPH-MCNC: 3.6 G/DL — SIGNIFICANT CHANGE UP (ref 3.3–5)
ALP SERPL-CCNC: 47 U/L — SIGNIFICANT CHANGE UP (ref 40–120)
ALP SERPL-CCNC: 52 U/L — SIGNIFICANT CHANGE UP (ref 40–120)
ALP SERPL-CCNC: 55 U/L — SIGNIFICANT CHANGE UP (ref 40–120)
ALT FLD-CCNC: 43 U/L — SIGNIFICANT CHANGE UP (ref 10–45)
ALT FLD-CCNC: 47 U/L — HIGH (ref 10–45)
ALT FLD-CCNC: 48 U/L — HIGH (ref 10–45)
ANION GAP SERPL CALC-SCNC: 10 MMOL/L — SIGNIFICANT CHANGE UP (ref 5–17)
ANION GAP SERPL CALC-SCNC: 11 MMOL/L — SIGNIFICANT CHANGE UP (ref 5–17)
ANION GAP SERPL CALC-SCNC: 11 MMOL/L — SIGNIFICANT CHANGE UP (ref 5–17)
APTT BLD: 33.4 SEC — SIGNIFICANT CHANGE UP (ref 27.5–35.5)
APTT BLD: 45.4 SEC — HIGH (ref 27.5–35.5)
AST SERPL-CCNC: 46 U/L — HIGH (ref 10–40)
AST SERPL-CCNC: 49 U/L — HIGH (ref 10–40)
AST SERPL-CCNC: 53 U/L — HIGH (ref 10–40)
BILIRUB SERPL-MCNC: 0.6 MG/DL — SIGNIFICANT CHANGE UP (ref 0.2–1.2)
BILIRUB SERPL-MCNC: 0.6 MG/DL — SIGNIFICANT CHANGE UP (ref 0.2–1.2)
BILIRUB SERPL-MCNC: 0.8 MG/DL — SIGNIFICANT CHANGE UP (ref 0.2–1.2)
BLD GP AB SCN SERPL QL: NEGATIVE — SIGNIFICANT CHANGE UP
BUN SERPL-MCNC: 22 MG/DL — SIGNIFICANT CHANGE UP (ref 7–23)
BUN SERPL-MCNC: 23 MG/DL — SIGNIFICANT CHANGE UP (ref 7–23)
BUN SERPL-MCNC: 25 MG/DL — HIGH (ref 7–23)
CALCIUM SERPL-MCNC: 8.2 MG/DL — LOW (ref 8.4–10.5)
CALCIUM SERPL-MCNC: 8.4 MG/DL — SIGNIFICANT CHANGE UP (ref 8.4–10.5)
CALCIUM SERPL-MCNC: 8.9 MG/DL — SIGNIFICANT CHANGE UP (ref 8.4–10.5)
CHLORIDE SERPL-SCNC: 105 MMOL/L — SIGNIFICANT CHANGE UP (ref 96–108)
CHLORIDE SERPL-SCNC: 106 MMOL/L — SIGNIFICANT CHANGE UP (ref 96–108)
CHLORIDE SERPL-SCNC: 107 MMOL/L — SIGNIFICANT CHANGE UP (ref 96–108)
CO2 SERPL-SCNC: 21 MMOL/L — LOW (ref 22–31)
CO2 SERPL-SCNC: 22 MMOL/L — SIGNIFICANT CHANGE UP (ref 22–31)
CO2 SERPL-SCNC: 23 MMOL/L — SIGNIFICANT CHANGE UP (ref 22–31)
CREAT SERPL-MCNC: 0.85 MG/DL — SIGNIFICANT CHANGE UP (ref 0.5–1.3)
CREAT SERPL-MCNC: 0.88 MG/DL — SIGNIFICANT CHANGE UP (ref 0.5–1.3)
CREAT SERPL-MCNC: 0.96 MG/DL — SIGNIFICANT CHANGE UP (ref 0.5–1.3)
EGFR: 81 ML/MIN/1.73M2 — SIGNIFICANT CHANGE UP
EGFR: 89 ML/MIN/1.73M2 — SIGNIFICANT CHANGE UP
EGFR: 90 ML/MIN/1.73M2 — SIGNIFICANT CHANGE UP
GAS PNL BLDA: SIGNIFICANT CHANGE UP
GLUCOSE BLDC GLUCOMTR-MCNC: 136 MG/DL — HIGH (ref 70–99)
GLUCOSE BLDC GLUCOMTR-MCNC: 190 MG/DL — HIGH (ref 70–99)
GLUCOSE SERPL-MCNC: 136 MG/DL — HIGH (ref 70–99)
GLUCOSE SERPL-MCNC: 168 MG/DL — HIGH (ref 70–99)
GLUCOSE SERPL-MCNC: 181 MG/DL — HIGH (ref 70–99)
HCT VFR BLD CALC: 28.5 % — LOW (ref 39–50)
HCT VFR BLD CALC: 30.1 % — LOW (ref 39–50)
HCT VFR BLD CALC: 33.5 % — LOW (ref 39–50)
HGB BLD-MCNC: 11.3 G/DL — LOW (ref 13–17)
HGB BLD-MCNC: 9.9 G/DL — LOW (ref 13–17)
HGB BLD-MCNC: 9.9 G/DL — LOW (ref 13–17)
INR BLD: 1.09 — SIGNIFICANT CHANGE UP (ref 0.88–1.16)
INR BLD: 1.23 — HIGH (ref 0.88–1.16)
MAGNESIUM SERPL-MCNC: 1.8 MG/DL — SIGNIFICANT CHANGE UP (ref 1.6–2.6)
MAGNESIUM SERPL-MCNC: 2 MG/DL — SIGNIFICANT CHANGE UP (ref 1.6–2.6)
MAGNESIUM SERPL-MCNC: 2.3 MG/DL — SIGNIFICANT CHANGE UP (ref 1.6–2.6)
MCHC RBC-ENTMCNC: 30.6 PG — SIGNIFICANT CHANGE UP (ref 27–34)
MCHC RBC-ENTMCNC: 30.7 PG — SIGNIFICANT CHANGE UP (ref 27–34)
MCHC RBC-ENTMCNC: 31.5 PG — SIGNIFICANT CHANGE UP (ref 27–34)
MCHC RBC-ENTMCNC: 32.9 GM/DL — SIGNIFICANT CHANGE UP (ref 32–36)
MCHC RBC-ENTMCNC: 33.7 GM/DL — SIGNIFICANT CHANGE UP (ref 32–36)
MCHC RBC-ENTMCNC: 34.7 GM/DL — SIGNIFICANT CHANGE UP (ref 32–36)
MCV RBC AUTO: 90.8 FL — SIGNIFICANT CHANGE UP (ref 80–100)
MCV RBC AUTO: 91 FL — SIGNIFICANT CHANGE UP (ref 80–100)
MCV RBC AUTO: 92.9 FL — SIGNIFICANT CHANGE UP (ref 80–100)
NRBC # BLD: 0 /100 WBCS — SIGNIFICANT CHANGE UP (ref 0–0)
PLATELET # BLD AUTO: 127 K/UL — LOW (ref 150–400)
PLATELET # BLD AUTO: 133 K/UL — LOW (ref 150–400)
PLATELET # BLD AUTO: 149 K/UL — LOW (ref 150–400)
POTASSIUM SERPL-MCNC: 4.2 MMOL/L — SIGNIFICANT CHANGE UP (ref 3.5–5.3)
POTASSIUM SERPL-MCNC: 4.6 MMOL/L — SIGNIFICANT CHANGE UP (ref 3.5–5.3)
POTASSIUM SERPL-MCNC: 4.8 MMOL/L — SIGNIFICANT CHANGE UP (ref 3.5–5.3)
POTASSIUM SERPL-SCNC: 4.2 MMOL/L — SIGNIFICANT CHANGE UP (ref 3.5–5.3)
POTASSIUM SERPL-SCNC: 4.6 MMOL/L — SIGNIFICANT CHANGE UP (ref 3.5–5.3)
POTASSIUM SERPL-SCNC: 4.8 MMOL/L — SIGNIFICANT CHANGE UP (ref 3.5–5.3)
PROT SERPL-MCNC: 4.9 G/DL — LOW (ref 6–8.3)
PROT SERPL-MCNC: 5.2 G/DL — LOW (ref 6–8.3)
PROT SERPL-MCNC: 5.9 G/DL — LOW (ref 6–8.3)
PROTHROM AB SERPL-ACNC: 13 SEC — SIGNIFICANT CHANGE UP (ref 10.5–13.4)
PROTHROM AB SERPL-ACNC: 14.7 SEC — HIGH (ref 10.5–13.4)
RBC # BLD: 3.14 M/UL — LOW (ref 4.2–5.8)
RBC # BLD: 3.24 M/UL — LOW (ref 4.2–5.8)
RBC # BLD: 3.68 M/UL — LOW (ref 4.2–5.8)
RBC # FLD: 13.1 % — SIGNIFICANT CHANGE UP (ref 10.3–14.5)
RBC # FLD: 13.2 % — SIGNIFICANT CHANGE UP (ref 10.3–14.5)
RBC # FLD: 13.3 % — SIGNIFICANT CHANGE UP (ref 10.3–14.5)
RH IG SCN BLD-IMP: POSITIVE — SIGNIFICANT CHANGE UP
SODIUM SERPL-SCNC: 137 MMOL/L — SIGNIFICANT CHANGE UP (ref 135–145)
SODIUM SERPL-SCNC: 139 MMOL/L — SIGNIFICANT CHANGE UP (ref 135–145)
SODIUM SERPL-SCNC: 140 MMOL/L — SIGNIFICANT CHANGE UP (ref 135–145)
WBC # BLD: 3.98 K/UL — SIGNIFICANT CHANGE UP (ref 3.8–10.5)
WBC # BLD: 4.14 K/UL — SIGNIFICANT CHANGE UP (ref 3.8–10.5)
WBC # BLD: 6.1 K/UL — SIGNIFICANT CHANGE UP (ref 3.8–10.5)
WBC # FLD AUTO: 3.98 K/UL — SIGNIFICANT CHANGE UP (ref 3.8–10.5)
WBC # FLD AUTO: 4.14 K/UL — SIGNIFICANT CHANGE UP (ref 3.8–10.5)
WBC # FLD AUTO: 6.1 K/UL — SIGNIFICANT CHANGE UP (ref 3.8–10.5)

## 2022-03-04 PROCEDURE — 93321 DOPPLER ECHO F-UP/LMTD STD: CPT | Mod: 26

## 2022-03-04 PROCEDURE — 92933 PRQ TRLML C ATHRC ST ANGIOP1: CPT | Mod: LD

## 2022-03-04 PROCEDURE — 92986 REVISION OF AORTIC VALVE: CPT | Mod: 80

## 2022-03-04 PROCEDURE — 71045 X-RAY EXAM CHEST 1 VIEW: CPT | Mod: 26,76

## 2022-03-04 PROCEDURE — 93010 ELECTROCARDIOGRAM REPORT: CPT

## 2022-03-04 PROCEDURE — 99291 CRITICAL CARE FIRST HOUR: CPT

## 2022-03-04 PROCEDURE — 99292 CRITICAL CARE ADDL 30 MIN: CPT

## 2022-03-04 PROCEDURE — 93308 TTE F-UP OR LMTD: CPT | Mod: 26

## 2022-03-04 PROCEDURE — 92986 REVISION OF AORTIC VALVE: CPT

## 2022-03-04 DEVICE — IMPLANTABLE DEVICE: Type: IMPLANTABLE DEVICE | Status: FUNCTIONAL

## 2022-03-04 DEVICE — DRYSEAL FLEX INTRO SHEATH 12FR 33CM: Type: IMPLANTABLE DEVICE | Status: FUNCTIONAL

## 2022-03-04 DEVICE — INTRODUCER RAABE 7F X 55CM: Type: IMPLANTABLE DEVICE | Status: FUNCTIONAL

## 2022-03-04 DEVICE — KIT PACE ELCTR BIPOLAR 5FR: Type: IMPLANTABLE DEVICE | Status: FUNCTIONAL

## 2022-03-04 DEVICE — INTRO MICROPUNC 4FRX10CM SS: Type: IMPLANTABLE DEVICE | Status: FUNCTIONAL

## 2022-03-04 DEVICE — SHEATH INTRODUCER TERUMO PINNACLE CORONARY 8FR X 10CM X 0.038" MINI WIRE: Type: IMPLANTABLE DEVICE | Status: FUNCTIONAL

## 2022-03-04 DEVICE — CATH DX JL5 INFIN 5FRX100CM: Type: IMPLANTABLE DEVICE | Status: FUNCTIONAL

## 2022-03-04 DEVICE — GWIRE GUID  0.035INX150CM: Type: IMPLANTABLE DEVICE | Status: FUNCTIONAL

## 2022-03-04 DEVICE — GUIDEWIRE RADIFOCUS GLIDEWIRE ANGLED 0.035" X 260CM STIFF: Type: IMPLANTABLE DEVICE | Status: FUNCTIONAL

## 2022-03-04 DEVICE — GWIRE STR .035X260 STD: Type: IMPLANTABLE DEVICE | Status: FUNCTIONAL

## 2022-03-04 DEVICE — CATH MICRO CARAVEL 1.9FR135CM: Type: IMPLANTABLE DEVICE | Status: FUNCTIONAL

## 2022-03-04 DEVICE — INTRO SHEATH 12FRX30CM: Type: IMPLANTABLE DEVICE | Status: FUNCTIONAL

## 2022-03-04 DEVICE — CATH ANGIO GLIDECATH STRAIGHT 4FR X 100CM: Type: IMPLANTABLE DEVICE | Status: FUNCTIONAL

## 2022-03-04 DEVICE — BLLN MUSTANG 10X40MMX135CM: Type: IMPLANTABLE DEVICE | Status: FUNCTIONAL

## 2022-03-04 DEVICE — CATH OMNI FLSH 0.035IN 5FRX65: Type: IMPLANTABLE DEVICE | Status: FUNCTIONAL

## 2022-03-04 DEVICE — GUIDEWIRE TERUMO GLIDEWIRE ANGLED .035" X 150CM STANDARD: Type: IMPLANTABLE DEVICE | Status: FUNCTIONAL

## 2022-03-04 DEVICE — CATH BLLN TRU FLOW 22MMX3.5CM: Type: IMPLANTABLE DEVICE | Status: FUNCTIONAL

## 2022-03-04 DEVICE — PACING CATH PACEL RIGHT HEART CURVE 5FR KIT: Type: IMPLANTABLE DEVICE | Status: FUNCTIONAL

## 2022-03-04 DEVICE — WIRE GD CONFIDA BRECKER CRV .035X260: Type: IMPLANTABLE DEVICE | Status: FUNCTIONAL

## 2022-03-04 DEVICE — BLLN MUSTANG 12X40MMX135CM: Type: IMPLANTABLE DEVICE | Status: FUNCTIONAL

## 2022-03-04 DEVICE — SHEATH INTRODUCER TERUMO PINNACLE CORONARY 7FR X 10CM X 0.038" MINI WIRE: Type: IMPLANTABLE DEVICE | Status: FUNCTIONAL

## 2022-03-04 DEVICE — GWIRE VASC J TIP 035X260: Type: IMPLANTABLE DEVICE | Status: FUNCTIONAL

## 2022-03-04 DEVICE — WIREGUIDE TOROFLEX .018X40CM: Type: IMPLANTABLE DEVICE | Status: FUNCTIONAL

## 2022-03-04 DEVICE — CATH DX PIG 145 INFIN 5FRX110CM: Type: IMPLANTABLE DEVICE | Status: FUNCTIONAL

## 2022-03-04 DEVICE — GUIDEWIRE RUNTHROUGH NS 180CM: Type: IMPLANTABLE DEVICE | Status: FUNCTIONAL

## 2022-03-04 DEVICE — SUT PERCLOSE PROGLIDE 6FR: Type: IMPLANTABLE DEVICE | Status: FUNCTIONAL

## 2022-03-04 DEVICE — GUIDEWIRE STANDARD STRAIGHT .035" X 180CM: Type: IMPLANTABLE DEVICE | Status: FUNCTIONAL

## 2022-03-04 RX ORDER — SODIUM CHLORIDE 9 MG/ML
1000 INJECTION INTRAMUSCULAR; INTRAVENOUS; SUBCUTANEOUS
Refills: 0 | Status: DISCONTINUED | OUTPATIENT
Start: 2022-03-04 | End: 2022-03-10

## 2022-03-04 RX ORDER — ATORVASTATIN CALCIUM 80 MG/1
80 TABLET, FILM COATED ORAL AT BEDTIME
Refills: 0 | Status: DISCONTINUED | OUTPATIENT
Start: 2022-03-04 | End: 2022-03-10

## 2022-03-04 RX ORDER — HEPARIN SODIUM 5000 [USP'U]/ML
5000 INJECTION INTRAVENOUS; SUBCUTANEOUS EVERY 8 HOURS
Refills: 0 | Status: DISCONTINUED | OUTPATIENT
Start: 2022-03-04 | End: 2022-03-10

## 2022-03-04 RX ORDER — ESCITALOPRAM OXALATE 10 MG/1
10 TABLET, FILM COATED ORAL DAILY
Refills: 0 | Status: DISCONTINUED | OUTPATIENT
Start: 2022-03-04 | End: 2022-03-10

## 2022-03-04 RX ORDER — DEXTROSE 50 % IN WATER 50 %
25 SYRINGE (ML) INTRAVENOUS ONCE
Refills: 0 | Status: DISCONTINUED | OUTPATIENT
Start: 2022-03-04 | End: 2022-03-10

## 2022-03-04 RX ORDER — NOREPINEPHRINE BITARTRATE/D5W 8 MG/250ML
0.05 PLASTIC BAG, INJECTION (ML) INTRAVENOUS
Qty: 8 | Refills: 0 | Status: DISCONTINUED | OUTPATIENT
Start: 2022-03-04 | End: 2022-03-05

## 2022-03-04 RX ORDER — ALBUMIN HUMAN 25 %
250 VIAL (ML) INTRAVENOUS ONCE
Refills: 0 | Status: COMPLETED | OUTPATIENT
Start: 2022-03-04 | End: 2022-03-04

## 2022-03-04 RX ORDER — ONDANSETRON 8 MG/1
4 TABLET, FILM COATED ORAL ONCE
Refills: 0 | Status: COMPLETED | OUTPATIENT
Start: 2022-03-04 | End: 2022-03-04

## 2022-03-04 RX ORDER — ASPIRIN/CALCIUM CARB/MAGNESIUM 324 MG
81 TABLET ORAL DAILY
Refills: 0 | Status: DISCONTINUED | OUTPATIENT
Start: 2022-03-05 | End: 2022-03-10

## 2022-03-04 RX ORDER — SODIUM CHLORIDE 9 MG/ML
1000 INJECTION, SOLUTION INTRAVENOUS
Refills: 0 | Status: DISCONTINUED | OUTPATIENT
Start: 2022-03-04 | End: 2022-03-10

## 2022-03-04 RX ORDER — INSULIN LISPRO 100/ML
VIAL (ML) SUBCUTANEOUS
Refills: 0 | Status: DISCONTINUED | OUTPATIENT
Start: 2022-03-04 | End: 2022-03-10

## 2022-03-04 RX ORDER — DEXTROSE 50 % IN WATER 50 %
15 SYRINGE (ML) INTRAVENOUS ONCE
Refills: 0 | Status: DISCONTINUED | OUTPATIENT
Start: 2022-03-04 | End: 2022-03-10

## 2022-03-04 RX ORDER — PANTOPRAZOLE SODIUM 20 MG/1
40 TABLET, DELAYED RELEASE ORAL ONCE
Refills: 0 | Status: COMPLETED | OUTPATIENT
Start: 2022-03-04 | End: 2022-03-04

## 2022-03-04 RX ORDER — POLYETHYLENE GLYCOL 3350 17 G/17G
17 POWDER, FOR SOLUTION ORAL DAILY
Refills: 0 | Status: DISCONTINUED | OUTPATIENT
Start: 2022-03-04 | End: 2022-03-10

## 2022-03-04 RX ORDER — DULOXETINE HYDROCHLORIDE 30 MG/1
20 CAPSULE, DELAYED RELEASE ORAL DAILY
Refills: 0 | Status: DISCONTINUED | OUTPATIENT
Start: 2022-03-04 | End: 2022-03-10

## 2022-03-04 RX ORDER — GLUCAGON INJECTION, SOLUTION 0.5 MG/.1ML
1 INJECTION, SOLUTION SUBCUTANEOUS ONCE
Refills: 0 | Status: DISCONTINUED | OUTPATIENT
Start: 2022-03-04 | End: 2022-03-10

## 2022-03-04 RX ORDER — CLOPIDOGREL BISULFATE 75 MG/1
600 TABLET, FILM COATED ORAL ONCE
Refills: 0 | Status: COMPLETED | OUTPATIENT
Start: 2022-03-04 | End: 2022-03-04

## 2022-03-04 RX ORDER — VASOPRESSIN 20 [USP'U]/ML
0.02 INJECTION INTRAVENOUS
Qty: 50 | Refills: 0 | Status: DISCONTINUED | OUTPATIENT
Start: 2022-03-04 | End: 2022-03-05

## 2022-03-04 RX ORDER — ACETAMINOPHEN 500 MG
650 TABLET ORAL EVERY 6 HOURS
Refills: 0 | Status: DISCONTINUED | OUTPATIENT
Start: 2022-03-04 | End: 2022-03-10

## 2022-03-04 RX ORDER — ASPIRIN/CALCIUM CARB/MAGNESIUM 324 MG
325 TABLET ORAL ONCE
Refills: 0 | Status: COMPLETED | OUTPATIENT
Start: 2022-03-04 | End: 2022-03-04

## 2022-03-04 RX ORDER — CLOPIDOGREL BISULFATE 75 MG/1
75 TABLET, FILM COATED ORAL DAILY
Refills: 0 | Status: DISCONTINUED | OUTPATIENT
Start: 2022-03-05 | End: 2022-03-10

## 2022-03-04 RX ORDER — MAGNESIUM SULFATE 500 MG/ML
2 VIAL (ML) INJECTION ONCE
Refills: 0 | Status: COMPLETED | OUTPATIENT
Start: 2022-03-04 | End: 2022-03-04

## 2022-03-04 RX ORDER — MIRTAZAPINE 45 MG/1
7.5 TABLET, ORALLY DISINTEGRATING ORAL AT BEDTIME
Refills: 0 | Status: DISCONTINUED | OUTPATIENT
Start: 2022-03-04 | End: 2022-03-10

## 2022-03-04 RX ORDER — TAMSULOSIN HYDROCHLORIDE 0.4 MG/1
0.4 CAPSULE ORAL AT BEDTIME
Refills: 0 | Status: DISCONTINUED | OUTPATIENT
Start: 2022-03-04 | End: 2022-03-10

## 2022-03-04 RX ORDER — DEXTROSE 50 % IN WATER 50 %
12.5 SYRINGE (ML) INTRAVENOUS ONCE
Refills: 0 | Status: DISCONTINUED | OUTPATIENT
Start: 2022-03-04 | End: 2022-03-10

## 2022-03-04 RX ADMIN — CLOPIDOGREL BISULFATE 600 MILLIGRAM(S): 75 TABLET, FILM COATED ORAL at 14:00

## 2022-03-04 RX ADMIN — CHLORHEXIDINE GLUCONATE 1 APPLICATION(S): 213 SOLUTION TOPICAL at 00:10

## 2022-03-04 RX ADMIN — Medication 25 GRAM(S): at 15:40

## 2022-03-04 RX ADMIN — CHLORHEXIDINE GLUCONATE 15 MILLILITER(S): 213 SOLUTION TOPICAL at 07:37

## 2022-03-04 RX ADMIN — DULOXETINE HYDROCHLORIDE 20 MILLIGRAM(S): 30 CAPSULE, DELAYED RELEASE ORAL at 22:01

## 2022-03-04 RX ADMIN — ESCITALOPRAM OXALATE 10 MILLIGRAM(S): 10 TABLET, FILM COATED ORAL at 22:01

## 2022-03-04 RX ADMIN — Medication 125 MILLILITER(S): at 21:10

## 2022-03-04 RX ADMIN — TAMSULOSIN HYDROCHLORIDE 0.4 MILLIGRAM(S): 0.4 CAPSULE ORAL at 21:26

## 2022-03-04 RX ADMIN — SODIUM CHLORIDE 3 MILLILITER(S): 9 INJECTION INTRAMUSCULAR; INTRAVENOUS; SUBCUTANEOUS at 05:09

## 2022-03-04 RX ADMIN — ONDANSETRON 4 MILLIGRAM(S): 8 TABLET, FILM COATED ORAL at 15:47

## 2022-03-04 RX ADMIN — Medication 650 MILLIGRAM(S): at 17:00

## 2022-03-04 RX ADMIN — HEPARIN SODIUM 5000 UNIT(S): 5000 INJECTION INTRAVENOUS; SUBCUTANEOUS at 05:03

## 2022-03-04 RX ADMIN — HEPARIN SODIUM 5000 UNIT(S): 5000 INJECTION INTRAVENOUS; SUBCUTANEOUS at 21:26

## 2022-03-04 RX ADMIN — Medication 325 MILLIGRAM(S): at 14:30

## 2022-03-04 RX ADMIN — PANTOPRAZOLE SODIUM 40 MILLIGRAM(S): 20 TABLET, DELAYED RELEASE ORAL at 21:26

## 2022-03-04 RX ADMIN — CHLORHEXIDINE GLUCONATE 1 APPLICATION(S): 213 SOLUTION TOPICAL at 05:03

## 2022-03-04 RX ADMIN — Medication 650 MILLIGRAM(S): at 17:30

## 2022-03-04 RX ADMIN — ATORVASTATIN CALCIUM 80 MILLIGRAM(S): 80 TABLET, FILM COATED ORAL at 21:26

## 2022-03-04 RX ADMIN — PANTOPRAZOLE SODIUM 40 MILLIGRAM(S): 20 TABLET, DELAYED RELEASE ORAL at 05:10

## 2022-03-04 RX ADMIN — MIRTAZAPINE 7.5 MILLIGRAM(S): 45 TABLET, ORALLY DISINTEGRATING ORAL at 22:01

## 2022-03-04 RX ADMIN — Medication 2: at 18:55

## 2022-03-04 NOTE — CONSULT NOTE ADULT - SUBJECTIVE AND OBJECTIVE BOX
Vascular Attending:  Dr. 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  Resulted in some bruising to his right face, and a quarter size hematoma right forehead.  Did not hit his head.  CT head (report on chart) at the OSH was negative for any brain bleed.  No other injuries sustained.  While at [place] he was found to have severe AS and CT Angio.  He was then transferred to [facility] for cardiac cath which he had today via right femoral artery and was told that "he may need a stent in his heart".  It showed severe prox and mid-distal LAD, heavily calcified. In addition to his pre-syncope symptoms, he has been feeling SOB/HEIN recently with symptoms arising after walking one flight of stairs or during minimal exertion, class III NYHA symptoms.  No symptoms at rest.  Denies chest pain but sometimes has swelling in his feet.    Today the patient was undergoing BAV/PCI, during the procedure after removal of left groin 22fr femoral sheath the percutaneous closure device suture failed, and artery started to bleed  after which Aquaseal was used to seal the vessel however the hematoma kept expanding. Vascular Surgery was called to bedside for assistance.   Dr. Walters performed an angiogram which showed a pseudoaneurysm in the left KURTIS, without dissection or active bleeding. A balloon was inflated for a total of 10 mins with   resolution of flow in the pseudoaneurysm on completion angio, and confirmed with US. Right groin 7fr sheath removed, 15mins of manual pressure applied. Both groins soft. Left groin hematoma marked. Pulses palpable at the end of the case. Patient extubated and transferred to [facility] for monitoring.     PAST MEDICAL & SURGICAL HISTORY:  History of aortic stenosis    HTN (hypertension)    Hyperlipidemia    Diabetes mellitus    LYNDA on CPAP    Arthritis    CAD (coronary artery disease)    Ataxia, family, spinal    MEDICATIONS  (STANDING):  aspirin enteric coated 325 milliGRAM(s) Oral once  atorvastatin 80 milliGRAM(s) Oral at bedtime  clopidogrel Tablet 600 milliGRAM(s) Oral once  dextrose 40% Gel 15 Gram(s) Oral once  dextrose 5%. 1000 milliLiter(s) (50 mL/Hr) IV Continuous <Continuous>  dextrose 5%. 1000 milliLiter(s) (100 mL/Hr) IV Continuous <Continuous>  dextrose 50% Injectable 25 Gram(s) IV Push once  dextrose 50% Injectable 12.5 Gram(s) IV Push once  dextrose 50% Injectable 25 Gram(s) IV Push once  DULoxetine 20 milliGRAM(s) Oral daily  escitalopram 10 milliGRAM(s) Oral daily  glucagon  Injectable 1 milliGRAM(s) IntraMuscular once  heparin   Injectable 5000 Unit(s) SubCutaneous every 8 hours  insulin lispro (ADMELOG) corrective regimen sliding scale   SubCutaneous three times a day before meals  mirtazapine 7.5 milliGRAM(s) Oral at bedtime  pantoprazole    Tablet 40 milliGRAM(s) Oral once  polyethylene glycol 3350 17 Gram(s) Oral daily  sodium chloride 0.9%. 1000 milliLiter(s) (10 mL/Hr) IV Continuous <Continuous>  tamsulosin 0.4 milliGRAM(s) Oral at bedtime    Allergies  No Known Allergies      Vital Signs Last 24 Hrs  T(C): 36.3 (04 Mar 2022 05:11), Max: 37.1 (03 Mar 2022 14:30)  T(F): 97.4 (04 Mar 2022 05:11), Max: 98.7 (03 Mar 2022 14:30)  HR: 70 (04 Mar 2022 06:02) (64 - 88)  BP: 140/74 (04 Mar 2022 05:11) (138/77 - 148/72)  BP(mean): 101 (04 Mar 2022 04:35) (101 - 110)  RR: 18 (04 Mar 2022 06:02) (15 - 32)  SpO2: 100% (04 Mar 2022 06:02) (91% - 100%)      PHYSICAL EXAM: POST OP    Constitutional: extubated, sleepy but arousable     Respiratory: No respiratory distress     Cardiovascular: RRR    Extremities: RLE: right groin with ecchymosis, soft, no active hemorrhage, no hematoma. Left groin hematoma present and marked, non expanding, no active bleeding.     Vascular: RLE: 2+FEM/POP/PT ?DP LLE: 2+FEM/POP/DP/PT. Bilateral feet warm, well perfused, good cap refill      LABS:                        11.3   3.98  )-----------( 133      ( 04 Mar 2022 07:49 )             33.5     03-04    139  |  106  |  25<H>  ----------------------------<  136<H>  4.2   |  23  |  0.96    Ca    8.9      04 Mar 2022 07:49  Mg     2.0     03-04    TPro  5.9<L>  /  Alb  3.6  /  TBili  0.6  /  DBili  x   /  AST  49<H>  /  ALT  47<H>  /  AlkPhos  55  03-04    PT/INR - ( 04 Mar 2022 07:49 )   PT: 13.0 sec;   INR: 1.09        PTT - ( 04 Mar 2022 07:49 )  PTT:33.4 sec  Urinalysis Basic - ( 03 Mar 2022 15:19 )    Color: Yellow / Appearance: Clear / S.020 / pH: x  Gluc: x / Ketone: Trace mg/dL  / Bili: Negative / Urobili: 4.0 E.U./dL   Blood: x / Protein: NEGATIVE mg/dL / Nitrite: NEGATIVE   Leuk Esterase: NEGATIVE / RBC: x / WBC x   Sq Epi: x / Non Sq Epi: x / Bacteria: x

## 2022-03-04 NOTE — CONSULT NOTE ADULT - ASSESSMENT
75yo M undergoing BAV/PCI today complicated by failed closure device and left groin bleeding, intra-op found to have left KURTIS pseudoaneurysm s/p balloon occlusion now with resolved flow in the pseudoaneurysm     -Bedrest with lying flat x 6 hours post op  -Frequent neurochecks  -Monitor groins     Will follow. Case d/w Dr. Walters and primary team.

## 2022-03-04 NOTE — PROGRESS NOTE ADULT - SUBJECTIVE AND OBJECTIVE BOX
INTERVAL HPI/OVERNIGHT EVENTS:    OpDay: PCI/BAV; balloon/stent to CFA  EF normal     75yo Male Hx tobacco use, HTN, HLD, DM, CAD/MI/stent, ataxia (uses walker), Prostate CA - XRT in remission, LYNDA/CPAP, arthritis - chronic neck/back pain - regular PT/nerve blocks w/near syncope and syncopal episodes     Pt fell to floor during one of these episode  - hit his face  CT Head - no hemorrhage or intracranial injury    At [place] - w/u found - severe AS  Transferred to [facility] for Cath via right femoral artery - severe prox and mid-distal LAD, heavily calcified.    to OR today - PCI/BAV performed ; angiogram - pseudoaneurysm left KURTIS - balloon x 10 min with resolution of flow in pseudoaneurysm on completion angio. Left groin hematoma    given 1 U pRBC ; 3L Crystalloid; 500 albumin   urinary retention - chun placed - 3L out - hypotension post-procedure - Vasopressin started     arrived to ICU extubated - lethargic but awake and following simple commands    PMHx includes but is not limited to:  History of aortic stenosis  HTN (hypertension)  Hyperlipidemia  Diabetes mellitus  LYNDA on CPAP  Arthritis  CAD (coronary artery disease)  Ataxia, family, spinal    ICU Vital Signs Last 24 Hrs  T(C): 36.3 (04 Mar 2022 05:11), Max: 37.1 (03 Mar 2022 14:30)  T(F): 97.4 (04 Mar 2022 05:11), Max: 98.7 (03 Mar 2022 14:30)  HR: 76 (04 Mar 2022 14:00) (64 - 88) sinus   BP: 140/74 (04 Mar 2022 05:11) (138/77 - 148/72)  BP(mean): 101 (04 Mar 2022 04:35) (101 - 110)  ABP: 94/47 (04 Mar 2022 14:00) (94/47 - 94/47)  ABP(mean): 64 (04 Mar 2022 14:00) (64 - 64)  RR: 18 (04 Mar 2022 06:02) (15 - 32)  SpO2: 98% (04 Mar 2022 14:00) (91% - 100%) 2l NC    Qtts:   Vasopressin    I&O's Summary    03 Mar 2022 07:01  -  04 Mar 2022 07:00  --------------------------------------------------------  IN: 700 mL / OUT: 1200 mL / NET: -500 mL    Physical Exam    Heart - regular (-)rub/gallop  Lungs - CTA anterior (-)rub/gallop  Abd - (+)BS soft NTND (-)r/r/g  Ext - warm to touch; no cyanosis/clubbing - left inguinal hematoma  Neuro - alert/oriented and interactive - nonfocal  Skin - no rash     LABS:                        11.3   3.98  )-----------( 133      ( 04 Mar 2022 07:49 )             33.5     03-04    139  |  106  |  25<H>  ----------------------------<  136<H>  4.2   |  23  |  0.96    Ca    8.9      04 Mar 2022 07:49  Mg     2.0     03-04    TPro  5.9<L>  /  Alb  3.6  /  TBili  0.6  /  DBili  x   /  AST  49<H>  /  ALT  47<H>  /  AlkPhos  55  03-04    PT/INR - ( 04 Mar 2022 07:49 )   PT: 13.0 sec;   INR: 1.09     PTT - ( 04 Mar 2022 07:49 )  PTT:33.4 sec    RADIOLOGY & ADDITIONAL STUDIES: reviewed    Patient iwht presyncopal and syncopal episode and severe aortic stenosis with CAD - now s/p PCI - stent x 2/BAV and balloon angio of left CFA pseudoaneurysm with intraop resolution     1. CV  sinus rhythm   on vaso - developed hypotension post BAV  volume resuscitation given and now on vasopressin - maintain MAP 65-70  ASA/Plavix load to be given ASAP in light of stents and then daily dosing  monitor cannulation sites and serial labs   if hypotension persists will have low threshold to obtain ECHO   statin   patient to lie in supine positioning for now    maintain glycemic control   DVT and GI prophylaxis    d/w anesthesia/staff and structural heart attending  I have spent/provided stated minutes of critical care time to this patient: 90

## 2022-03-05 LAB
ALBUMIN SERPL ELPH-MCNC: 3.4 G/DL — SIGNIFICANT CHANGE UP (ref 3.3–5)
ALBUMIN SERPL ELPH-MCNC: 3.5 G/DL — SIGNIFICANT CHANGE UP (ref 3.3–5)
ALBUMIN SERPL ELPH-MCNC: 3.8 G/DL — SIGNIFICANT CHANGE UP (ref 3.3–5)
ALP SERPL-CCNC: 48 U/L — SIGNIFICANT CHANGE UP (ref 40–120)
ALP SERPL-CCNC: 49 U/L — SIGNIFICANT CHANGE UP (ref 40–120)
ALP SERPL-CCNC: 49 U/L — SIGNIFICANT CHANGE UP (ref 40–120)
ALT FLD-CCNC: 42 U/L — SIGNIFICANT CHANGE UP (ref 10–45)
ALT FLD-CCNC: 44 U/L — SIGNIFICANT CHANGE UP (ref 10–45)
ALT FLD-CCNC: 48 U/L — HIGH (ref 10–45)
ANION GAP SERPL CALC-SCNC: 8 MMOL/L — SIGNIFICANT CHANGE UP (ref 5–17)
ANION GAP SERPL CALC-SCNC: 8 MMOL/L — SIGNIFICANT CHANGE UP (ref 5–17)
ANION GAP SERPL CALC-SCNC: 9 MMOL/L — SIGNIFICANT CHANGE UP (ref 5–17)
APTT BLD: 26.7 SEC — LOW (ref 27.5–35.5)
APTT BLD: 29.5 SEC — SIGNIFICANT CHANGE UP (ref 27.5–35.5)
APTT BLD: 31 SEC — SIGNIFICANT CHANGE UP (ref 27.5–35.5)
AST SERPL-CCNC: 42 U/L — HIGH (ref 10–40)
AST SERPL-CCNC: 43 U/L — HIGH (ref 10–40)
AST SERPL-CCNC: 50 U/L — HIGH (ref 10–40)
BILIRUB SERPL-MCNC: 0.6 MG/DL — SIGNIFICANT CHANGE UP (ref 0.2–1.2)
BILIRUB SERPL-MCNC: 0.6 MG/DL — SIGNIFICANT CHANGE UP (ref 0.2–1.2)
BILIRUB SERPL-MCNC: 0.7 MG/DL — SIGNIFICANT CHANGE UP (ref 0.2–1.2)
BUN SERPL-MCNC: 21 MG/DL — SIGNIFICANT CHANGE UP (ref 7–23)
BUN SERPL-MCNC: 24 MG/DL — HIGH (ref 7–23)
BUN SERPL-MCNC: 25 MG/DL — HIGH (ref 7–23)
CALCIUM SERPL-MCNC: 8 MG/DL — LOW (ref 8.4–10.5)
CALCIUM SERPL-MCNC: 8.4 MG/DL — SIGNIFICANT CHANGE UP (ref 8.4–10.5)
CALCIUM SERPL-MCNC: 8.6 MG/DL — SIGNIFICANT CHANGE UP (ref 8.4–10.5)
CHLORIDE SERPL-SCNC: 104 MMOL/L — SIGNIFICANT CHANGE UP (ref 96–108)
CHLORIDE SERPL-SCNC: 105 MMOL/L — SIGNIFICANT CHANGE UP (ref 96–108)
CHLORIDE SERPL-SCNC: 105 MMOL/L — SIGNIFICANT CHANGE UP (ref 96–108)
CHOLEST SERPL-MCNC: 79 MG/DL — SIGNIFICANT CHANGE UP
CO2 SERPL-SCNC: 23 MMOL/L — SIGNIFICANT CHANGE UP (ref 22–31)
CO2 SERPL-SCNC: 24 MMOL/L — SIGNIFICANT CHANGE UP (ref 22–31)
CO2 SERPL-SCNC: 25 MMOL/L — SIGNIFICANT CHANGE UP (ref 22–31)
CREAT SERPL-MCNC: 0.87 MG/DL — SIGNIFICANT CHANGE UP (ref 0.5–1.3)
CREAT SERPL-MCNC: 0.91 MG/DL — SIGNIFICANT CHANGE UP (ref 0.5–1.3)
CREAT SERPL-MCNC: 0.96 MG/DL — SIGNIFICANT CHANGE UP (ref 0.5–1.3)
EGFR: 81 ML/MIN/1.73M2 — SIGNIFICANT CHANGE UP
EGFR: 87 ML/MIN/1.73M2 — SIGNIFICANT CHANGE UP
EGFR: 89 ML/MIN/1.73M2 — SIGNIFICANT CHANGE UP
GAS PNL BLDA: SIGNIFICANT CHANGE UP
GLUCOSE BLDC GLUCOMTR-MCNC: 138 MG/DL — HIGH (ref 70–99)
GLUCOSE BLDC GLUCOMTR-MCNC: 223 MG/DL — HIGH (ref 70–99)
GLUCOSE SERPL-MCNC: 127 MG/DL — HIGH (ref 70–99)
GLUCOSE SERPL-MCNC: 135 MG/DL — HIGH (ref 70–99)
GLUCOSE SERPL-MCNC: 197 MG/DL — HIGH (ref 70–99)
HCT VFR BLD CALC: 25.4 % — LOW (ref 39–50)
HCT VFR BLD CALC: 26.1 % — LOW (ref 39–50)
HCT VFR BLD CALC: 26.6 % — LOW (ref 39–50)
HDLC SERPL-MCNC: 25 MG/DL — LOW
HGB BLD-MCNC: 8.9 G/DL — LOW (ref 13–17)
HGB BLD-MCNC: 9.1 G/DL — LOW (ref 13–17)
HGB BLD-MCNC: 9.3 G/DL — LOW (ref 13–17)
INR BLD: 1.15 — SIGNIFICANT CHANGE UP (ref 0.88–1.16)
INR BLD: 1.17 — HIGH (ref 0.88–1.16)
INR BLD: 1.19 — HIGH (ref 0.88–1.16)
LACTATE SERPL-SCNC: 0.7 MMOL/L — SIGNIFICANT CHANGE UP (ref 0.5–2)
LIPID PNL WITH DIRECT LDL SERPL: 28 MG/DL — SIGNIFICANT CHANGE UP
MAGNESIUM SERPL-MCNC: 2.2 MG/DL — SIGNIFICANT CHANGE UP (ref 1.6–2.6)
MAGNESIUM SERPL-MCNC: 2.2 MG/DL — SIGNIFICANT CHANGE UP (ref 1.6–2.6)
MAGNESIUM SERPL-MCNC: 2.3 MG/DL — SIGNIFICANT CHANGE UP (ref 1.6–2.6)
MCHC RBC-ENTMCNC: 31.2 PG — SIGNIFICANT CHANGE UP (ref 27–34)
MCHC RBC-ENTMCNC: 31.5 PG — SIGNIFICANT CHANGE UP (ref 27–34)
MCHC RBC-ENTMCNC: 31.6 PG — SIGNIFICANT CHANGE UP (ref 27–34)
MCHC RBC-ENTMCNC: 34.9 GM/DL — SIGNIFICANT CHANGE UP (ref 32–36)
MCHC RBC-ENTMCNC: 35 GM/DL — SIGNIFICANT CHANGE UP (ref 32–36)
MCHC RBC-ENTMCNC: 35 GM/DL — SIGNIFICANT CHANGE UP (ref 32–36)
MCV RBC AUTO: 89.1 FL — SIGNIFICANT CHANGE UP (ref 80–100)
MCV RBC AUTO: 90.3 FL — SIGNIFICANT CHANGE UP (ref 80–100)
MCV RBC AUTO: 90.5 FL — SIGNIFICANT CHANGE UP (ref 80–100)
NON HDL CHOLESTEROL: 54 MG/DL — SIGNIFICANT CHANGE UP
NRBC # BLD: 0 /100 WBCS — SIGNIFICANT CHANGE UP (ref 0–0)
PHOSPHATE SERPL-MCNC: 2.8 MG/DL — SIGNIFICANT CHANGE UP (ref 2.5–4.5)
PHOSPHATE SERPL-MCNC: 3 MG/DL — SIGNIFICANT CHANGE UP (ref 2.5–4.5)
PHOSPHATE SERPL-MCNC: 3.5 MG/DL — SIGNIFICANT CHANGE UP (ref 2.5–4.5)
PLATELET # BLD AUTO: 111 K/UL — LOW (ref 150–400)
PLATELET # BLD AUTO: 116 K/UL — LOW (ref 150–400)
PLATELET # BLD AUTO: 119 K/UL — LOW (ref 150–400)
POTASSIUM SERPL-MCNC: 4.3 MMOL/L — SIGNIFICANT CHANGE UP (ref 3.5–5.3)
POTASSIUM SERPL-MCNC: 4.4 MMOL/L — SIGNIFICANT CHANGE UP (ref 3.5–5.3)
POTASSIUM SERPL-MCNC: 4.7 MMOL/L — SIGNIFICANT CHANGE UP (ref 3.5–5.3)
POTASSIUM SERPL-SCNC: 4.3 MMOL/L — SIGNIFICANT CHANGE UP (ref 3.5–5.3)
POTASSIUM SERPL-SCNC: 4.4 MMOL/L — SIGNIFICANT CHANGE UP (ref 3.5–5.3)
POTASSIUM SERPL-SCNC: 4.7 MMOL/L — SIGNIFICANT CHANGE UP (ref 3.5–5.3)
PROT SERPL-MCNC: 5.3 G/DL — LOW (ref 6–8.3)
PROT SERPL-MCNC: 5.4 G/DL — LOW (ref 6–8.3)
PROT SERPL-MCNC: 5.5 G/DL — LOW (ref 6–8.3)
PROTHROM AB SERPL-ACNC: 13.7 SEC — HIGH (ref 10.5–13.4)
PROTHROM AB SERPL-ACNC: 14 SEC — HIGH (ref 10.5–13.4)
PROTHROM AB SERPL-ACNC: 14.2 SEC — HIGH (ref 10.5–13.4)
RBC # BLD: 2.85 M/UL — LOW (ref 4.2–5.8)
RBC # BLD: 2.89 M/UL — LOW (ref 4.2–5.8)
RBC # BLD: 2.94 M/UL — LOW (ref 4.2–5.8)
RBC # FLD: 13.1 % — SIGNIFICANT CHANGE UP (ref 10.3–14.5)
RBC # FLD: 13.2 % — SIGNIFICANT CHANGE UP (ref 10.3–14.5)
RBC # FLD: 13.2 % — SIGNIFICANT CHANGE UP (ref 10.3–14.5)
SODIUM SERPL-SCNC: 136 MMOL/L — SIGNIFICANT CHANGE UP (ref 135–145)
SODIUM SERPL-SCNC: 137 MMOL/L — SIGNIFICANT CHANGE UP (ref 135–145)
SODIUM SERPL-SCNC: 138 MMOL/L — SIGNIFICANT CHANGE UP (ref 135–145)
TRIGL SERPL-MCNC: 128 MG/DL — SIGNIFICANT CHANGE UP
WBC # BLD: 4.96 K/UL — SIGNIFICANT CHANGE UP (ref 3.8–10.5)
WBC # BLD: 5.06 K/UL — SIGNIFICANT CHANGE UP (ref 3.8–10.5)
WBC # BLD: 5.14 K/UL — SIGNIFICANT CHANGE UP (ref 3.8–10.5)
WBC # FLD AUTO: 4.96 K/UL — SIGNIFICANT CHANGE UP (ref 3.8–10.5)
WBC # FLD AUTO: 5.06 K/UL — SIGNIFICANT CHANGE UP (ref 3.8–10.5)
WBC # FLD AUTO: 5.14 K/UL — SIGNIFICANT CHANGE UP (ref 3.8–10.5)

## 2022-03-05 PROCEDURE — 93306 TTE W/DOPPLER COMPLETE: CPT | Mod: 26

## 2022-03-05 PROCEDURE — 74174 CTA ABD&PLVS W/CONTRAST: CPT | Mod: 26

## 2022-03-05 PROCEDURE — 70496 CT ANGIOGRAPHY HEAD: CPT | Mod: 26

## 2022-03-05 PROCEDURE — 71045 X-RAY EXAM CHEST 1 VIEW: CPT | Mod: 26

## 2022-03-05 PROCEDURE — 70498 CT ANGIOGRAPHY NECK: CPT | Mod: 26

## 2022-03-05 PROCEDURE — 70551 MRI BRAIN STEM W/O DYE: CPT | Mod: 26

## 2022-03-05 PROCEDURE — 99291 CRITICAL CARE FIRST HOUR: CPT

## 2022-03-05 PROCEDURE — 0042T: CPT

## 2022-03-05 PROCEDURE — 99292 CRITICAL CARE ADDL 30 MIN: CPT

## 2022-03-05 PROCEDURE — 99233 SBSQ HOSP IP/OBS HIGH 50: CPT

## 2022-03-05 RX ORDER — SODIUM CHLORIDE 9 MG/ML
1000 INJECTION INTRAMUSCULAR; INTRAVENOUS; SUBCUTANEOUS
Refills: 0 | Status: DISCONTINUED | OUTPATIENT
Start: 2022-03-05 | End: 2022-03-10

## 2022-03-05 RX ORDER — NICARDIPINE HYDROCHLORIDE 30 MG/1
5 CAPSULE, EXTENDED RELEASE ORAL
Qty: 40 | Refills: 0 | Status: DISCONTINUED | OUTPATIENT
Start: 2022-03-05 | End: 2022-03-06

## 2022-03-05 RX ORDER — DEXTROAMPHETAMINE SACCHARATE, AMPHETAMINE ASPARTATE, DEXTROAMPHETAMINE SULFATE AND AMPHETAMINE SULFATE 1.875; 1.875; 1.875; 1.875 MG/1; MG/1; MG/1; MG/1
25 TABLET ORAL
Refills: 0 | Status: DISCONTINUED | OUTPATIENT
Start: 2022-03-05 | End: 2022-03-06

## 2022-03-05 RX ORDER — INSULIN LISPRO 100/ML
2 VIAL (ML) SUBCUTANEOUS ONCE
Refills: 0 | Status: COMPLETED | OUTPATIENT
Start: 2022-03-05 | End: 2022-03-05

## 2022-03-05 RX ORDER — PANTOPRAZOLE SODIUM 20 MG/1
40 TABLET, DELAYED RELEASE ORAL
Refills: 0 | Status: DISCONTINUED | OUTPATIENT
Start: 2022-03-05 | End: 2022-03-10

## 2022-03-05 RX ADMIN — Medication 81 MILLIGRAM(S): at 15:51

## 2022-03-05 RX ADMIN — HEPARIN SODIUM 5000 UNIT(S): 5000 INJECTION INTRAVENOUS; SUBCUTANEOUS at 21:11

## 2022-03-05 RX ADMIN — POLYETHYLENE GLYCOL 3350 17 GRAM(S): 17 POWDER, FOR SOLUTION ORAL at 15:52

## 2022-03-05 RX ADMIN — HEPARIN SODIUM 5000 UNIT(S): 5000 INJECTION INTRAVENOUS; SUBCUTANEOUS at 15:52

## 2022-03-05 RX ADMIN — DULOXETINE HYDROCHLORIDE 20 MILLIGRAM(S): 30 CAPSULE, DELAYED RELEASE ORAL at 15:51

## 2022-03-05 RX ADMIN — Medication 650 MILLIGRAM(S): at 08:00

## 2022-03-05 RX ADMIN — Medication 650 MILLIGRAM(S): at 07:27

## 2022-03-05 RX ADMIN — NICARDIPINE HYDROCHLORIDE 25 MG/HR: 30 CAPSULE, EXTENDED RELEASE ORAL at 23:41

## 2022-03-05 RX ADMIN — CLOPIDOGREL BISULFATE 75 MILLIGRAM(S): 75 TABLET, FILM COATED ORAL at 15:52

## 2022-03-05 RX ADMIN — ATORVASTATIN CALCIUM 80 MILLIGRAM(S): 80 TABLET, FILM COATED ORAL at 21:11

## 2022-03-05 RX ADMIN — Medication 2 UNIT(S): at 21:38

## 2022-03-05 RX ADMIN — ESCITALOPRAM OXALATE 10 MILLIGRAM(S): 10 TABLET, FILM COATED ORAL at 15:51

## 2022-03-05 RX ADMIN — MIRTAZAPINE 7.5 MILLIGRAM(S): 45 TABLET, ORALLY DISINTEGRATING ORAL at 21:10

## 2022-03-05 RX ADMIN — TAMSULOSIN HYDROCHLORIDE 0.4 MILLIGRAM(S): 0.4 CAPSULE ORAL at 21:11

## 2022-03-05 NOTE — PROGRESS NOTE ADULT - ASSESSMENT
77yo M undergoing BAV/PCI today complicated by failed closure device and left groin bleeding, intra-op found to have left KURTIS pseudoaneurysm s/p balloon occlusion now with resolved flow in the pseudoaneurysm. On POD#1 patient complaining of being unable to move left leg, no pain and sensation intact throughout. CTAP performed 3/5 demonstrated Left medial thigh hematoma and no collection over groin puncture site. MRI brain done 3/5 showed Multifocal punctate areas of acute ischemia involving the cerebellum bilaterally (right greater than left) as well as the right occipital and parietal lobes bilaterally.     -No indication for any further vascular intervention at this time  -Frequent neurochecks  -Monitor groins

## 2022-03-05 NOTE — PROGRESS NOTE ADULT - SUBJECTIVE AND OBJECTIVE BOX
SUBJECTIVE: Patient seen at bedside complaining of not being able to move his left leg.     Vital Signs Last 24 Hrs  T(C): 36.9 (05 Mar 2022 14:00), Max: 37 (05 Mar 2022 05:01)  T(F): 98.5 (05 Mar 2022 14:00), Max: 98.6 (05 Mar 2022 05:01)  HR: 78 (05 Mar 2022 15:00) (55 - 95)  BP: 104/55 (05 Mar 2022 08:00) (96/55 - 149/78)  BP(mean): 75 (05 Mar 2022 08:00) (68 - 109)  RR: 18 (05 Mar 2022 15:00) (14 - 20)  SpO2: 98% (05 Mar 2022 15:00) (91% - 100%)    Physical Exam:  General: NAD  Pulmonary: Nonlabored breathing, no respiratory distress  Cardiovascular: NSR  Abdominal: soft, NT/ND, no organomegaly  Extremities: WWP, SCDs in place    Lines/drains/tubes:    I&O's Summary    04 Mar 2022 07:01  -  05 Mar 2022 07:00  --------------------------------------------------------  IN: 593.6 mL / OUT: 1810 mL / NET: -1216.4 mL    05 Mar 2022 07:01  -  05 Mar 2022 15:54  --------------------------------------------------------  IN: 100 mL / OUT: 975 mL / NET: -875 mL        LABS:                        8.9    4.96  )-----------( 111      ( 05 Mar 2022 13:19 )             25.4     03-05    138  |  105  |  21  ----------------------------<  127<H>  4.3   |  25  |  0.91    Ca    8.6      05 Mar 2022 13:19  Phos  2.8     03-05  Mg     2.2     03-05    TPro  5.3<L>  /  Alb  3.5  /  TBili  0.7  /  DBili  x   /  AST  42<H>  /  ALT  42  /  AlkPhos  49  03-05    PT/INR - ( 05 Mar 2022 13:19 )   PT: 13.7 sec;   INR: 1.15          PTT - ( 05 Mar 2022 13:19 )  PTT:29.5 sec    CAPILLARY BLOOD GLUCOSE      POCT Blood Glucose.: 138 mg/dL (05 Mar 2022 07:50)  POCT Blood Glucose.: 190 mg/dL (04 Mar 2022 18:24)    LIVER FUNCTIONS - ( 05 Mar 2022 13:19 )  Alb: 3.5 g/dL / Pro: 5.3 g/dL / ALK PHOS: 49 U/L / ALT: 42 U/L / AST: 42 U/L / GGT: x             RADIOLOGY & ADDITIONAL STUDIES:         SUBJECTIVE: Patient seen at bedside complaining of not being able to move his left leg since early this morning, he endorses some L groin incisional pain but no other LE pain/loss of sensation. Patient off pressors. No CP, SOB, fevers or chills.     Vital Signs Last 24 Hrs  T(C): 36.9 (05 Mar 2022 14:00), Max: 37 (05 Mar 2022 05:01)  T(F): 98.5 (05 Mar 2022 14:00), Max: 98.6 (05 Mar 2022 05:01)  HR: 78 (05 Mar 2022 15:00) (55 - 95)  BP: 104/55 (05 Mar 2022 08:00) (96/55 - 149/78)  BP(mean): 75 (05 Mar 2022 08:00) (68 - 109)  RR: 18 (05 Mar 2022 15:00) (14 - 20)  SpO2: 98% (05 Mar 2022 15:00) (91% - 100%)    Physical Exam:  General: NAD  Pulmonary: Nonlabored breathing, no respiratory distress  Cardiovascular: NSR  Abdominal: soft, NT/ND, no organomegaly  Groin; soft, dressing clean dry and intact, no swelling or ecchymosis.  Extremities: WWP, SCDs in place, Palp DP/PT pulses bilaterally.  Neuro: Unable to flex L hip, flex L knee, poor strength on L foot dorsiflexion Sensation intact throughout LLE. Slightly slurred speech that seems to be patient's baseline.     Lines/drains/tubes:    I&O's Summary    04 Mar 2022 07:01  -  05 Mar 2022 07:00  --------------------------------------------------------  IN: 593.6 mL / OUT: 1810 mL / NET: -1216.4 mL    05 Mar 2022 07:01  -  05 Mar 2022 15:54  --------------------------------------------------------  IN: 100 mL / OUT: 975 mL / NET: -875 mL        LABS:                        8.9    4.96  )-----------( 111      ( 05 Mar 2022 13:19 )             25.4     03-05    138  |  105  |  21  ----------------------------<  127<H>  4.3   |  25  |  0.91    Ca    8.6      05 Mar 2022 13:19  Phos  2.8     03-05  Mg     2.2     03-05    TPro  5.3<L>  /  Alb  3.5  /  TBili  0.7  /  DBili  x   /  AST  42<H>  /  ALT  42  /  AlkPhos  49  03-05    PT/INR - ( 05 Mar 2022 13:19 )   PT: 13.7 sec;   INR: 1.15          PTT - ( 05 Mar 2022 13:19 )  PTT:29.5 sec    CAPILLARY BLOOD GLUCOSE      POCT Blood Glucose.: 138 mg/dL (05 Mar 2022 07:50)  POCT Blood Glucose.: 190 mg/dL (04 Mar 2022 18:24)    LIVER FUNCTIONS - ( 05 Mar 2022 13:19 )  Alb: 3.5 g/dL / Pro: 5.3 g/dL / ALK PHOS: 49 U/L / ALT: 42 U/L / AST: 42 U/L / GGT: x             RADIOLOGY & ADDITIONAL STUDIES:

## 2022-03-05 NOTE — CONSULT NOTE ADULT - SUBJECTIVE AND OBJECTIVE BOX
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 because while at one of his physical therapy sessions he began to "feel faint" and was told to sit down in a chair that was brought to him, but he didn't make it in time and "fell on the floor and hit his face".  Resulted in some bruising to his right face, and a quarter size hematoma right forehead.  Did not hit his head.  CT head (report on chart) at the OSH was negative for any brain bleed.  No other injuries sustained.  While at [place] he was found to have severe AS and CT Angio.  He was then transferred to [facility] for cardiac cath which he had 3/2 via right femoral artery and was told that "he may need a stent in his heart".  It showed severe prox and mid-distal LAD, heavily calcified. In addition to his pre-syncope symptoms, he has been feeling SOB/HEIN recently with symptoms arising after walking one flight of stairs or during minimal exertion, class III NYHA symptoms.  No symptoms at rest.     Around 0625 patient endorsed acute left leg paralysis to nurse. Per nurse, LKW was around 0430, pt was moving around in bed, able to roll himself to the side. Stroke code called for acute left leg paralysis. Pt does have baseline slurred speech but was unsure if it was worsened, has some baseline leg weakness but was antigravity prior. States mouth is severely dry and unsure if it is affecting his speech. NIHSS 4 at the time, sBP in 140s. Pt brought to ED CT scan, at CT scan pt was able to move left leg along the same plane. After CT scan, pt had improved strength proximally, continued to be weak distally. NIHSS 3 at this time. CTH negative, CTP with some perfusion deficit however had motion artifact, CTA negative.     T(C): 37 (22 @ 05:01), Max: 37 (22 @ 05:01)  HR: 83 (22 @ 07:00) (52 - 95)  BP: 123/69 (22 @ 06:00) (96/55 - 149/78)  RR: 18 (22 @ 07:00) (12 - 18)  SpO2: 97% (22 @ 07:00) (91% - 100%)    PAST MEDICAL & SURGICAL HISTORY:  History of aortic stenosis    HTN (hypertension)    Hyperlipidemia    Diabetes mellitus    LYNDA on CPAP    Arthritis    CAD (coronary artery disease)    Ataxia, family, spinal    FAMILY HISTORY:    SOCIAL HISTORY:   Assist device:  walker  Tobacco ex smoker, quit .  Smoked 1 PPDx 5 years  ETOH 1 wine per week  Drug use  Denies  Independent with ADLs.    ROS:   Constitutional: No fever, weight loss or fatigue  Eyes: No eye pain, visual disturbances, or discharge  ENMT:  No difficulty hearing, tinnitus; No sinus or throat pain  Neck: No pain or stiffness  Respiratory: No cough, wheezing, chills or hemoptysis  Cardiovascular: No chest pain, palpitations, shortness of breath, or leg swelling  Gastrointestinal: No abdominal pain. No nausea, vomiting or hematemesis; No diarrhea or constipation. Nohematochezia.  Genitourinary: No dysuria, frequency, hematuria or incontinence  Neurological: As per HPI    MEDICATIONS  (STANDING):  aspirin  chewable 81 milliGRAM(s) Oral daily  atorvastatin 80 milliGRAM(s) Oral at bedtime  clopidogrel Tablet 75 milliGRAM(s) Oral daily  dextrose 40% Gel 15 Gram(s) Oral once  dextrose 5%. 1000 milliLiter(s) (50 mL/Hr) IV Continuous <Continuous>  dextrose 5%. 1000 milliLiter(s) (100 mL/Hr) IV Continuous <Continuous>  dextrose 50% Injectable 25 Gram(s) IV Push once  dextrose 50% Injectable 12.5 Gram(s) IV Push once  dextrose 50% Injectable 25 Gram(s) IV Push once  DULoxetine 20 milliGRAM(s) Oral daily  escitalopram 10 milliGRAM(s) Oral daily  glucagon  Injectable 1 milliGRAM(s) IntraMuscular once  heparin   Injectable 5000 Unit(s) SubCutaneous every 8 hours  insulin lispro (ADMELOG) corrective regimen sliding scale   SubCutaneous three times a day before meals  mirtazapine 7.5 milliGRAM(s) Oral at bedtime  pantoprazole    Tablet 40 milliGRAM(s) Oral before breakfast  polyethylene glycol 3350 17 Gram(s) Oral daily  sodium chloride 0.9%. 1000 milliLiter(s) (10 mL/Hr) IV Continuous <Continuous>  tamsulosin 0.4 milliGRAM(s) Oral at bedtime    MEDICATIONS  (PRN):  acetaminophen     Tablet .. 650 milliGRAM(s) Oral every 6 hours PRN Mild Pain (1 - 3)    Allergies    No Known Allergies    Intolerances      Vital Signs Last 24 Hrs  T(C): 37 (05 Mar 2022 05:01), Max: 37 (05 Mar 2022 05:01)  T(F): 98.6 (05 Mar 2022 05:01), Max: 98.6 (05 Mar 2022 05:01)  HR: 83 (05 Mar 2022 07:00) (52 - 95)  BP: 123/69 (05 Mar 2022 06:00) (96/55 - 149/78)  BP(mean): 91 (05 Mar 2022 06:00) (68 - 109)  RR: 18 (05 Mar 2022 07:00) (12 - 18)  SpO2: 97% (05 Mar 2022 07:00) (91% - 100%)    Physical exam:  Constitutional: No acute distress, conversant  Eyes: Anicteric sclerae, moist conjunctivae, see below for CNs  Neck: trachea midline  Cardiovascular: Regular rate and rhythm, no murmurs, rubs, or gallops.   Pulmonary: Anterior breath sounds clear bilaterally, no crackles or wheezing. No use of accessory muscles  GI: Abdomen soft, non-distended, non-tender  Extremities: Radial and DP pulses +2, no edema, hematoma in left groin, soft.    Neurologic:  -Mental status: Awake, alert, oriented to person, place, and time. Speech is fluent with intact naming, repetition, and comprehension, mild dysarthria. Recent and remote memory intact. Follows commands. Attention/concentration intact. Fund of knowledge appropriate.  -Cranial nerves:   II: Visual fields are full to confrontation.  III, IV, VI: Unable to burry towards right side, baseline per pt. Otherwise, extraocular movements are intact without nystagmus. Pupils equally round and reactive to light  V:  Facial sensation V1-V3 equal and intact   VII: Face is symmetric with normal eye closure and smile  VIII: Hearing is grossly intact   IX, X: Uvula is midline and soft palate rises symmetrically  XI: Head turning and shoulder shrug are intact.  XII: Tongue protrudes midline  Motor: Normal bulk and tone. No pronator drift. Strength bilateral upper extremity 5/5, RLE 5/5, LLE 1/5  Sensation: Intact to light touch bilaterally. No neglect or extinction on double simultaneous testing.  Coordination: No dysmetria on finger-to-nose b/l, none on heel-to-shin on right side, unable to test left LE   Reflexes: Downgoing toes bilaterally   Gait: deferred    NIHSS: 4 ASPECT Score: 8     Fingerstick Blood Glucose: CAPILLARY BLOOD GLUCOSE      POCT Blood Glucose.: 190 mg/dL (04 Mar 2022 18:24)    LABS:                        9.3    5.06  )-----------( 119      ( 05 Mar 2022 02:25 )             26.6     03-05    136  |  104  |  25<H>  ----------------------------<  197<H>  4.7   |  23  |  0.96    Ca    8.4      05 Mar 2022 02:25  Phos  3.5     03-05  Mg     2.2     03-05    TPro  5.5<L>  /  Alb  3.8  /  TBili  0.6  /  DBili  x   /  AST  50<H>  /  ALT  48<H>  /  AlkPhos  49  03-05    PT/INR - ( 05 Mar 2022 02:25 )   PT: 14.2 sec;   INR: 1.19          PTT - ( 05 Mar 2022 02:25 )  PTT:26.7 sec      Urinalysis Basic - ( 03 Mar 2022 15:19 )    Color: Yellow / Appearance: Clear / S.020 / pH: x  Gluc: x / Ketone: Trace mg/dL  / Bili: Negative / Urobili: 4.0 E.U./dL   Blood: x / Protein: NEGATIVE mg/dL / Nitrite: NEGATIVE   Leuk Esterase: NEGATIVE / RBC: x / WBC x   Sq Epi: x / Non Sq Epi: x / Bacteria: x      RADIOLOGY & ADDITIONAL STUDIES:    < from: CT Head No Cont (22 @ 07:05) >  IMPRESSION: No acute intracranial hemorrhage, mass effect, or CT evidence   of recent transcortical infarction.    < from: CT Perfusion w/ Maps w/ IV Cont (22 @ 07:05) >  IMPRESSION: Limited exam due to motion and suboptimal bolus timing.   Abnormal perfusion with rapid calculated mismatch volume of 21 mL. The   mismatch ratio is infinite.      < from: CT Angio Head w/ IV Cont (22 @ 07:06) >  IMPRESSION:  1.  No large vessel occlusion or high-grade stenosis.  2.  There is a 3 x 3 mm posteriorly directed saccular aneurysm arising   from the posterior aspect of the supraclinoid segment of the right   internal carotid artery      < from: CT Angio Neck w/ IV Cont (22 @ 07:06) >  IMPRESSION: No significant stenosis, occlusion, or dissection of the   cervical carotid or vertebral arteries.  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  Resulted in some bruising to his right face, and a quarter size hematoma right forehead.  Did not hit his head.  CT head (report on chart) at the OSH was negative for any brain bleed.  No other injuries sustained.  While at [place] he was found to have severe AS and CT Angio.  He was then transferred to [facility] for cardiac cath which he had 3/2 via right femoral artery and was told that "he may need a stent in his heart".  It showed severe prox and mid-distal LAD, heavily calcified. In addition to his pre-syncope symptoms, he has been feeling SOB/HEIN recently with symptoms arising after walking one flight of stairs or during minimal exertion, class III NYHA symptoms.  No symptoms at rest.     Around 0625 patient endorsed acute left leg paralysis to nurse. Per nurse, LKW was around 0430, pt was moving around in bed, able to roll himself to the side. Stroke code called for acute left leg paralysis. Pt does have baseline slurred speech but was unsure if it was worsened, has some baseline leg weakness but was antigravity prior. States mouth is severely dry and unsure if it is affecting his speech. NIHSS 4 at the time, sBP in 140s. Pt brought to ED CT scan, at CT scan pt was able to move left leg along the same plane. After CT scan, pt had improved strength proximally, continued to be weak distally. NIHSS 3 at this time. CTH negative, CTP with some perfusion deficit however had motion artifact, CTA negative.     Nurse states that his voice is the same as it was yesterday.    T(C): 37 (22 @ 05:01), Max: 37 (22 @ 05:01)  HR: 83 (22 @ 07:00) (52 - 95)  BP: 123/69 (22 @ 06:00) (96/55 - 149/78)  RR: 18 (22 @ 07:00) (12 - 18)  SpO2: 97% (22 @ 07:00) (91% - 100%)    PAST MEDICAL & SURGICAL HISTORY:  History of aortic stenosis    HTN (hypertension)    Hyperlipidemia    Diabetes mellitus    LYNDA on CPAP    Arthritis    CAD (coronary artery disease)    Ataxia, family, spinal    FAMILY HISTORY:    SOCIAL HISTORY:   Assist device:  walker  Tobacco ex smoker, quit .  Smoked 1 PPDx 5 years  ETOH 1 wine per week  Drug use  Denies  Independent with ADLs.    ROS:   Constitutional: No fever, weight loss or fatigue  Eyes: No eye pain, visual disturbances, or discharge  ENMT:  No difficulty hearing, tinnitus; No sinus or throat pain  Neck: No pain or stiffness  Respiratory: No cough, wheezing, chills or hemoptysis  Cardiovascular: No chest pain, palpitations, shortness of breath, or leg swelling  Gastrointestinal: No abdominal pain. No nausea, vomiting or hematemesis; No diarrhea or constipation. Nohematochezia.  Genitourinary: No dysuria, frequency, hematuria or incontinence  Neurological: As per HPI    MEDICATIONS  (STANDING):  aspirin  chewable 81 milliGRAM(s) Oral daily  atorvastatin 80 milliGRAM(s) Oral at bedtime  clopidogrel Tablet 75 milliGRAM(s) Oral daily  dextrose 40% Gel 15 Gram(s) Oral once  dextrose 5%. 1000 milliLiter(s) (50 mL/Hr) IV Continuous <Continuous>  dextrose 5%. 1000 milliLiter(s) (100 mL/Hr) IV Continuous <Continuous>  dextrose 50% Injectable 25 Gram(s) IV Push once  dextrose 50% Injectable 12.5 Gram(s) IV Push once  dextrose 50% Injectable 25 Gram(s) IV Push once  DULoxetine 20 milliGRAM(s) Oral daily  escitalopram 10 milliGRAM(s) Oral daily  glucagon  Injectable 1 milliGRAM(s) IntraMuscular once  heparin   Injectable 5000 Unit(s) SubCutaneous every 8 hours  insulin lispro (ADMELOG) corrective regimen sliding scale   SubCutaneous three times a day before meals  mirtazapine 7.5 milliGRAM(s) Oral at bedtime  pantoprazole    Tablet 40 milliGRAM(s) Oral before breakfast  polyethylene glycol 3350 17 Gram(s) Oral daily  sodium chloride 0.9%. 1000 milliLiter(s) (10 mL/Hr) IV Continuous <Continuous>  tamsulosin 0.4 milliGRAM(s) Oral at bedtime    MEDICATIONS  (PRN):  acetaminophen     Tablet .. 650 milliGRAM(s) Oral every 6 hours PRN Mild Pain (1 - 3)    Allergies    No Known Allergies    Intolerances      Vital Signs Last 24 Hrs  T(C): 37 (05 Mar 2022 05:01), Max: 37 (05 Mar 2022 05:01)  T(F): 98.6 (05 Mar 2022 05:01), Max: 98.6 (05 Mar 2022 05:01)  HR: 83 (05 Mar 2022 07:00) (52 - 95)  BP: 123/69 (05 Mar 2022 06:00) (96/55 - 149/78)  BP(mean): 91 (05 Mar 2022 06:00) (68 - 109)  RR: 18 (05 Mar 2022 07:00) (12 - 18)  SpO2: 97% (05 Mar 2022 07:00) (91% - 100%)    Physical exam:  Constitutional: No acute distress, conversant  Eyes: Anicteric sclerae, moist conjunctivae, see below for CNs  Neck: trachea midline  Cardiovascular: Regular rate and rhythm, no murmurs, rubs, or gallops.   Pulmonary: Anterior breath sounds clear bilaterally, no crackles or wheezing. No use of accessory muscles  GI: Abdomen soft, non-distended, non-tender  Extremities: Radial and DP pulses +2, no edema, hematoma in left groin, soft.    Neurologic:  -Mental status: Awake, alert, oriented to person, place, and time. Speech is fluent with intact naming, repetition, and comprehension, mild dysarthria. Recent and remote memory intact. Follows commands. Attention/concentration intact. Fund of knowledge appropriate.  -Cranial nerves:   II: Visual fields are full to confrontation.  III, IV, VI: Unable to burry towards right side, baseline per pt. Otherwise, extraocular movements are intact without nystagmus. Pupils equally round and reactive to light  V:  Facial sensation V1-V3 equal and intact   VII: Face is symmetric   Motor: Normal bulk and tone. No pronator drift. Strength bilateral upper extremity 5/5, RLE 5/5, LLE 2/5. Left foot drop (chronic), able to plantarflex 2+/5, knee extension 2+/5, knee flexion 2-/5, minimal hip flexion, able to move leg side to side.  Sensation: Intact to light touch bilaterally. No neglect or extinction on double simultaneous testing.  Coordination: No dysmetria on finger-to-nose b/l, none on heel-to-shin on right side, unable to test left LE   Gait: deferred    NIHSS: 4 ASPECT Score: 8     Fingerstick Blood Glucose: CAPILLARY BLOOD GLUCOSE      POCT Blood Glucose.: 190 mg/dL (04 Mar 2022 18:24)    LABS:                        9.3    5.06  )-----------( 119      ( 05 Mar 2022 02:25 )             26.6     03-05    136  |  104  |  25<H>  ----------------------------<  197<H>  4.7   |  23  |  0.96    Ca    8.4      05 Mar 2022 02:25  Phos  3.5     03-05  Mg     2.2     03-05    TPro  5.5<L>  /  Alb  3.8  /  TBili  0.6  /  DBili  x   /  AST  50<H>  /  ALT  48<H>  /  AlkPhos  49  03-05    PT/INR - ( 05 Mar 2022 02:25 )   PT: 14.2 sec;   INR: 1.19          PTT - ( 05 Mar 2022 02:25 )  PTT:26.7 sec      Urinalysis Basic - ( 03 Mar 2022 15:19 )    Color: Yellow / Appearance: Clear / S.020 / pH: x  Gluc: x / Ketone: Trace mg/dL  / Bili: Negative / Urobili: 4.0 E.U./dL   Blood: x / Protein: NEGATIVE mg/dL / Nitrite: NEGATIVE   Leuk Esterase: NEGATIVE / RBC: x / WBC x   Sq Epi: x / Non Sq Epi: x / Bacteria: x      RADIOLOGY & ADDITIONAL STUDIES:    < from: CT Head No Cont (22 @ 07:05) >  IMPRESSION: No acute intracranial hemorrhage, mass effect, or CT evidence   of recent transcortical infarction.    < from: CT Perfusion w/ Maps w/ IV Cont (22 @ 07:05) >  IMPRESSION: Limited exam due to motion and suboptimal bolus timing.   Abnormal perfusion with rapid calculated mismatch volume of 21 mL. The   mismatch ratio is infinite.      < from: CT Angio Head w/ IV Cont (22 @ 07:06) >  IMPRESSION:  1.  No large vessel occlusion or high-grade stenosis.  2.  There is a 3 x 3 mm posteriorly directed saccular aneurysm arising   from the posterior aspect of the supraclinoid segment of the right   internal carotid artery      < from: CT Angio Neck w/ IV Cont (22 @ 07:06) >  IMPRESSION: No significant stenosis, occlusion, or dissection of the   cervical carotid or vertebral arteries.  -----------------------------------------------------------------------------------------------------------------  IV-tPA (Y/N):  N                            Bolus time:    Alteplase Dose Verification w/ RN:  Reason IV-tPA not given: recent surgery, symptoms improving

## 2022-03-05 NOTE — PROGRESS NOTE ADULT - SUBJECTIVE AND OBJECTIVE BOX
INTERVAL HPI/OVERNIGHT EVENTS:    POD#1 : PCI/BAV; balloon/stent to CFA  EF normal     77yo Male Hx tobacco use, HTN, HLD, DM, CAD/MI/stent, ataxia (uses walker), Prostate CA - XRT in remission, LYNDA/CPAP, arthritis - chronic neck/back pain - regular PT/nerve blocks w/near syncope and syncopal episodes     Pt fell to floor during one of these episode  - hit his face  CT Head - no hemorrhage or intracranial injury    At [place] - w/u found - severe AS  Transferred to [facility] for Cath via right femoral artery - severe prox and mid-distal LAD, heavily calcified.    to OR today - PCI/BAV performed ; angiogram - pseudoaneurysm left KURTIS - balloon x 10 min with resolution of flow in pseudoaneurysm on completion angio. Left groin hematoma    given 1 U pRBC ; 3L Crystalloid; 500 albumin   urinary retention - chun placed - 3L out - hypotension post-procedure - Vasopressin started     arrived to ICU extubated - lethargic but awake and following simple commands  patient counts stable on serial assessment    no acute events reported overnight    this am approx 6a during patient care/cleaning - patient reported unable to move left lower extremity; sensation remains intact  Code stroke called - imaging including CTa/perfusion unremarkable for acute findings, 3 x 3 mm posteriorly directed saccular aneurysm arising from the posterior aspect of the supraclinoid segment of the right internal carotid artery    no noted dramatic changes (hyper or hypotension reported overnight)    symptoms demonstrating some improvement over time - able to abduct/adduct leg and some slight flexion at hip/knee but demonstrably weaker than right - marked change c/w exam immediately post-op     patient awake/alert and interactive - speaking with his physician in [place] at this time providing an update via phone    PMHx includes but is not limited to:  History of aortic stenosis  HTN (hypertension)  Hyperlipidemia  Diabetes mellitus  LYNDA on CPAP  Arthritis  CAD (coronary artery disease)  Ataxia, family, spinal; left foot drop (chronic)      ICU Vital Signs Last 24 Hrs  T(C): 36.7 (05 Mar 2022 09:00), Max: 37 (05 Mar 2022 05:01)  T(F): 98 (05 Mar 2022 09:00), Max: 98.6 (05 Mar 2022 05:01)  HR: 64 (05 Mar 2022 09:00) (52 - 95) sinus   BP: 104/55 (05 Mar 2022 08:00) (96/55 - 149/78)  BP(mean): 75 (05 Mar 2022 08:00) (68 - 109)  ABP: 115/45 (05 Mar 2022 09:00) (94/47 - 164/68)  ABP(mean): 64 (05 Mar 2022 09:00) (64 - 104)  RR: 18 (05 Mar 2022 09:00) (12 - 18)  SpO2: 99% (05 Mar 2022 09:00) (91% - 100%)    Qtts:     I&O's Summary    04 Mar 2022 07:01  -  05 Mar 2022 07:00  --------------------------------------------------------  IN: 593.6 mL / OUT: 1810 mL / NET: -1216.4 mL    05 Mar 2022 07:01  -  05 Mar 2022 10:24  --------------------------------------------------------  IN: 0 mL / OUT: 125 mL / NET: -125 mL            Physical Exam    Heart  Lungs  Abd  Ext  Chest  Neuro  Skin    LABS:                        9.1    5.14  )-----------( 116      ( 05 Mar 2022 07:49 )             26.1     03-05    137  |  105  |  24<H>  ----------------------------<  135<H>  4.4   |  24  |  0.87    Ca    8.0<L>      05 Mar 2022 07:49  Phos  3.0     03-05  Mg     2.3     03-05    TPro  5.4<L>  /  Alb  3.4  /  TBili  0.6  /  DBili  x   /  AST  43<H>  /  ALT  44  /  AlkPhos  48  03-05    PT/INR - ( 05 Mar 2022 07:49 )   PT: 14.0 sec;   INR: 1.17          PTT - ( 05 Mar 2022 07:49 )  PTT:31.0 sec  Urinalysis Basic - ( 03 Mar 2022 15:19 )    Color: Yellow / Appearance: Clear / S.020 / pH: x  Gluc: x / Ketone: Trace mg/dL  / Bili: Negative / Urobili: 4.0 E.U./dL   Blood: x / Protein: NEGATIVE mg/dL / Nitrite: NEGATIVE   Leuk Esterase: NEGATIVE / RBC: x / WBC x   Sq Epi: x / Non Sq Epi: x / Bacteria: x      ABG - ( 05 Mar 2022 08:05 )  pH, Arterial: 7.40  pH, Blood: x     /  pCO2: 44    /  pO2: 76    / HCO3: 27    / Base Excess: 2.0   /  SaO2: 95.4                RADIOLOGY & ADDITIONAL STUDIES:    I have spent/provided stated minutes of critical care time to this patient:  INTERVAL HPI/OVERNIGHT EVENTS:    POD#1 : PCI/BAV; balloon/stent to CFA  EF normal     75yo Male Hx tobacco use, HTN, HLD, DM, CAD/MI/stent, ataxia (uses walker), Prostate CA - XRT in remission, LYNDA/CPAP, arthritis - chronic neck/back pain - regular PT/nerve blocks w/near syncope and syncopal episodes     Pt fell to floor during one of these episode  - hit his face  CT Head - no hemorrhage or intracranial injury    At [place] - w/u found - severe AS  Transferred to [facility] for Cath via right femoral artery - severe prox and mid-distal LAD, heavily calcified.    to OR today - PCI/BAV performed ; angiogram - pseudoaneurysm left KURTIS - balloon x 10 min with resolution of flow in pseudoaneurysm on completion angio. 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 of the supraclinoid segment of the right internal carotid artery    no noted dramatic changes (hyper or hypotension reported overnight) - pressor requirement post-procedure - titrated off (1a) and has remained off for several hours with stable hemodynamics    symptoms demonstrating some improvement over time - able to abduct/adduct leg and some slight flexion at hip/knee but demonstrably weaker than right - marked change c/w exam immediately post-op     patient awake/alert and interactive - speaking with his physician in [place] at this time providing an update via phone    PMHx includes but is not limited to:  History of aortic stenosis  HTN (hypertension)  Hyperlipidemia  Diabetes mellitus  LYNDA on CPAP  Arthritis  CAD (coronary artery disease)  Ataxia, family, spinal; left foot drop (chronic)      ICU Vital Signs Last 24 Hrs  T(C): 36.7 (05 Mar 2022 09:00), Max: 37 (05 Mar 2022 05:01)  T(F): 98 (05 Mar 2022 09:00), Max: 98.6 (05 Mar 2022 05:01)  HR: 64 (05 Mar 2022 09:00) (52 - 95) sinus   BP: 104/55 (05 Mar 2022 08:00) (96/55 - 149/78)  BP(mean): 75 (05 Mar 2022 08:00) (68 - 109)  ABP: 115/45 (05 Mar 2022 09:00) (94/47 - 164/68)  ABP(mean): 64 (05 Mar 2022 09:00) (64 - 104)  RR: 18 (05 Mar 2022 09:00) (12 - 18)  SpO2: 99% (05 Mar 2022 09:00) (91% - 100%) RA    Qtts:   None     I&O's Summary    04 Mar 2022 07:01  -  05 Mar 2022 07:00  --------------------------------------------------------  IN: 593.6 mL / OUT: 1810 mL / NET: -1216.4 mL    05 Mar 2022 07:01  -  05 Mar 2022 10:24  --------------------------------------------------------  IN: 0 mL / OUT: 125 mL / NET: -125 mL    Physical Exam    Heart - regular (-)rub/gallop  Lungs - CTA anterior/laterally - no rhonchi/wheeze   Abd - (+)BS obese/soft - NTND (-)r/r/g  Ext - warm to touch; no cyanosis/clubbing; chronic stasis changes LE bilaterally ; inguinal site with some ecchymosis but soft to touch; no palpable tenderness/crepitus; no erythema  Neuro - alert/oriented and interactive - following simple commands 5/5 strength in RLE ; 3/5 motor strength in LLE ; sensory intact UE/LE  Skin - no rash     LABS:                        9.1    5.14  )-----------( 116      ( 05 Mar 2022 07:49 )             26.1     03-05    137  |  105  |  24<H>  ----------------------------<  135<H>  4.4   |  24  |  0.87    Ca    8.0<L>      05 Mar 2022 07:49  Phos  3.0     03-05  Mg     2.3     03-05    TPro  5.4<L>  /  Alb  3.4  /  TBili  0.6  /  DBili  x   /  AST  43<H>  /  ALT  44  /  AlkPhos  48  03-05    PT/INR - ( 05 Mar 2022 07:49 )   PT: 14.0 sec;   INR: 1.17     PTT - ( 05 Mar 2022 07:49 )  PTT:31.0 sec    ABG - ( 05 Mar 2022 08:05 ) 7.4/44/76/95    RADIOLOGY & ADDITIONAL STUDIES: reviewed     Patient with presyncopal and syncopal episode and severe aortic stenosis with CAD - now s/p PCI - stent x 2/BAV and balloon angio of left CFA pseudoaneurysm with intraop resolution. Now with new onset     1. CV  sinus rhythm   developed hypotension post BAV - requiring pressors - titrated off 1a and hemodynamically stable  olume resuscitation given and now on vasopressin - maintain MAP 65-70  ASA/Plavix load given 3/4 and on daily ASA/Plavix/statin - (+) stents   ECHO performed today per protocol    2. Neuro/Vascular   new onset LLE weakness  code stroke called - imaging - no acute intracranial abnormality or stenosis  plan MRI - already on ASA/Plaxis//high dose statin  No sensory component - sensory intact - ? impingement or exacerbation of baseline neuro deficits - baseline ataxia and left foot drop (ambulates with walker)  plan CTa Abd/pelvis to assess for compressive hematoma   plan IVF in light of dye load       maintain glycemic control ; ISS  restart home meds   DVT and GI prophylaxis    d/w patient/staff/vascular attending and structural heart attending    I have spent/provided stated minutes of critical care time to this patient: 2 hour

## 2022-03-05 NOTE — CONSULT NOTE ADULT - ASSESSMENT
: 76y Male with PMHx of HTN, HLD, NIDDM, "mild" MI in the past requiring a stent (diagonal branch), spinal ataxia (uses a walker at baseline), prostate CA s/p radiation, in remission x 8-9 years, LYNDA on CPAP at home with good compliance, chronic arthritis with neck and back pain presented to [facility] for feeling faint, found to have severe AS on CT angio, transferred to [facility] for cardiac cath on 3/2 which showed severe prox and mid-distal LAD, POD 1 for BAV/PCI. Stroke code called for acute left leg paralysis, NIHSS initially 4, decreased to 3 after CT scan. CTH negative, CTP with motion artifact, CTA shows no LVO, significant for saccular aneurysm of supraclinoid segment of R ICA. Not tpa candidate due to recent procedure, symptoms mildly improved.     1)Secondary stroke prevention  - continue ASA 81mg PO daily and Plavix 75mg PO daily  - continue Atorvastatin 80mg PO daily    2) Stroke risk factors  - A1C: 6.4  - LDL: obtain  - HTN, HLD,    3) Further management  - may maintain MAP of 90 to maintain cerebral perfusion  - recommend q1hr stroke neuro checks  - may need outpt neurology follow up  - provide stroke education    DVT prophylaxis   -Heparin SQ and SCDs    Discussed with Neurology Attending Dr. Colvin   : 76y Male with PMHx of HTN, HLD, NIDDM, "mild" MI in the past requiring a stent (diagonal branch), spinal ataxia (uses a walker at baseline), prostate CA s/p radiation, in remission x 8-9 years, LYNDA on CPAP at home with good compliance, chronic arthritis with neck and back pain presented to [facility] for feeling faint, found to have severe AS on CT angio, transferred to [facility] for cardiac cath on 3/2 which showed severe prox and mid-distal LAD, POD 1 for BAV/PCI. Stroke code called for acute left leg paralysis, NIHSS initially 4, decreased to 3 after CT scan. CTH negative, CTP with motion artifact, CTA shows no LVO, significant for saccular aneurysm of supraclinoid segment of R ICA. Not tpa candidate due to recent procedure, symptoms mildly improved.     1)Secondary stroke prevention  - continue ASA 81mg PO daily and Plavix 75mg PO daily  - continue Atorvastatin 80mg PO daily    2) Stroke risk factors  - A1C: 6.4  - LDL: obtain  - HTN, HLD,    3) Further management  - may maintain MAP of 90 to maintain cerebral perfusion  - recommend q1hr stroke neuro checks  - recommend MRI brain to rule out central cause of left lower extremity weakness  - provide stroke education    DVT prophylaxis   -Heparin SQ and SCDs    Discussed with Neurology Attending Dr. Colvin   : 76y Male with PMHx of HTN, HLD, NIDDM, "mild" MI in the past requiring a stent (diagonal branch), spinal ataxia (uses a walker at baseline), prostate CA s/p radiation, in remission x 8-9 years, LYNDA on CPAP at home with good compliance, chronic arthritis with neck and back pain presented to [facility] for feeling faint, found to have severe AS on CT angio, transferred to [facility] for cardiac cath on 3/2 which showed severe prox and mid-distal LAD, POD 1 for BAV/PCI. Stroke code called for acute left leg paralysis, NIHSS initially 4, decreased to 3 after CT scan. 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 Not tpa candidate due to recent procedure, symptoms mildly improved.     1)Secondary stroke prevention  - continue ASA 81mg PO daily and Plavix 75mg PO daily, for secondary stroke prevention given possible lacunar infarct on CT  - continue Atorvastatin 80mg PO daily    2) Stroke risk factors  - A1C: 6.4  - LDL: obtain  - HTN, HLD,    3) Further management  - may maintain MAP of 90 to maintain cerebral perfusion  - recommend q1hr stroke neuro checks  - recommend MRI brain to rule out central cause of left lower extremity weakness  - provide stroke education    DVT prophylaxis   -Heparin SQ and SCDs    Discussed with Neurology Attending Dr. Colvin   : 76y Male with PMHx of HTN, HLD, NIDDM, "mild" MI in the past requiring a stent (diagonal branch), spinal ataxia (uses a walker at baseline), prostate CA s/p radiation, in remission x 8-9 years, LYNDA on CPAP at home with good compliance, chronic arthritis with neck and back pain presented to [facility] for feeling faint, found to have severe AS on CT angio, transferred to [facility] for cardiac cath on 3/2 which showed severe prox and mid-distal LAD, POD 1 for BAV/PCI. Stroke code called for acute left leg paralysis, NIHSS initially 4, decreased to 3 after CT scan. 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 Not tpa candidate due to recent procedure, symptoms mildly improved.     1)Secondary stroke prevention  - continue ASA 81mg PO daily and Plavix 75mg PO daily, for secondary stroke prevention given possible lacunar infarct on CT  - continue Atorvastatin 80mg PO daily    2) Stroke risk factors  - A1C: 6.4  - LDL: obtain  - HTN, HLD,    3) Further management  - may maintain MAP of 90 to maintain cerebral perfusion  - recommend q1hr stroke neuro checks  - recommend MRI brain to rule out central cause of left lower extremity weakness  -Recommend Lipid panel  - provide stroke education    DVT prophylaxis   -Heparin SQ and SCDs    Discussed with Neurology Attending Dr. Colvin

## 2022-03-05 NOTE — CHART NOTE - NSCHARTNOTEFT_GEN_A_CORE
Imaging reviewed with Dr. Colvin and updated CTICU team.    MRI results: small strokes b/l cerebellum, R occipital, R parietal  These small strokes are possibly periprocedural, and do NOT explain patient's left leg weakness.    Per vascular team, CT pelvis done, with 5 cm groin hematoma and small embolic clot. Per vascular, not impinging on nerves, also does not explain patient's left leg weakness    Would recommend spine imaging  MR T and L spine    Continue aspirin, plavix, statin  Stroke neuro checks q1 hour  stroke education  rest of care per CTICU

## 2022-03-06 LAB
ALBUMIN SERPL ELPH-MCNC: 3.5 G/DL — SIGNIFICANT CHANGE UP (ref 3.3–5)
ALBUMIN SERPL ELPH-MCNC: 3.5 G/DL — SIGNIFICANT CHANGE UP (ref 3.3–5)
ALP SERPL-CCNC: 44 U/L — SIGNIFICANT CHANGE UP (ref 40–120)
ALP SERPL-CCNC: 51 U/L — SIGNIFICANT CHANGE UP (ref 40–120)
ALT FLD-CCNC: 37 U/L — SIGNIFICANT CHANGE UP (ref 10–45)
ALT FLD-CCNC: 40 U/L — SIGNIFICANT CHANGE UP (ref 10–45)
ANION GAP SERPL CALC-SCNC: 5 MMOL/L — SIGNIFICANT CHANGE UP (ref 5–17)
ANION GAP SERPL CALC-SCNC: 7 MMOL/L — SIGNIFICANT CHANGE UP (ref 5–17)
APTT BLD: 29.5 SEC — SIGNIFICANT CHANGE UP (ref 27.5–35.5)
APTT BLD: 31.3 SEC — SIGNIFICANT CHANGE UP (ref 27.5–35.5)
AST SERPL-CCNC: 34 U/L — SIGNIFICANT CHANGE UP (ref 10–40)
AST SERPL-CCNC: 38 U/L — SIGNIFICANT CHANGE UP (ref 10–40)
BILIRUB SERPL-MCNC: 0.5 MG/DL — SIGNIFICANT CHANGE UP (ref 0.2–1.2)
BILIRUB SERPL-MCNC: 0.6 MG/DL — SIGNIFICANT CHANGE UP (ref 0.2–1.2)
BUN SERPL-MCNC: 20 MG/DL — SIGNIFICANT CHANGE UP (ref 7–23)
BUN SERPL-MCNC: 21 MG/DL — SIGNIFICANT CHANGE UP (ref 7–23)
CALCIUM SERPL-MCNC: 8.2 MG/DL — LOW (ref 8.4–10.5)
CALCIUM SERPL-MCNC: 8.5 MG/DL — SIGNIFICANT CHANGE UP (ref 8.4–10.5)
CHLORIDE SERPL-SCNC: 104 MMOL/L — SIGNIFICANT CHANGE UP (ref 96–108)
CHLORIDE SERPL-SCNC: 106 MMOL/L — SIGNIFICANT CHANGE UP (ref 96–108)
CO2 SERPL-SCNC: 26 MMOL/L — SIGNIFICANT CHANGE UP (ref 22–31)
CO2 SERPL-SCNC: 28 MMOL/L — SIGNIFICANT CHANGE UP (ref 22–31)
CREAT SERPL-MCNC: 0.81 MG/DL — SIGNIFICANT CHANGE UP (ref 0.5–1.3)
CREAT SERPL-MCNC: 0.87 MG/DL — SIGNIFICANT CHANGE UP (ref 0.5–1.3)
EGFR: 89 ML/MIN/1.73M2 — SIGNIFICANT CHANGE UP
EGFR: 91 ML/MIN/1.73M2 — SIGNIFICANT CHANGE UP
GAS PNL BLDA: SIGNIFICANT CHANGE UP
GAS PNL BLDA: SIGNIFICANT CHANGE UP
GLUCOSE BLDC GLUCOMTR-MCNC: 140 MG/DL — HIGH (ref 70–99)
GLUCOSE BLDC GLUCOMTR-MCNC: 154 MG/DL — HIGH (ref 70–99)
GLUCOSE BLDC GLUCOMTR-MCNC: 155 MG/DL — HIGH (ref 70–99)
GLUCOSE BLDC GLUCOMTR-MCNC: 187 MG/DL — HIGH (ref 70–99)
GLUCOSE SERPL-MCNC: 146 MG/DL — HIGH (ref 70–99)
GLUCOSE SERPL-MCNC: 171 MG/DL — HIGH (ref 70–99)
HCT VFR BLD CALC: 24.9 % — LOW (ref 39–50)
HCT VFR BLD CALC: 25.4 % — LOW (ref 39–50)
HGB BLD-MCNC: 8.4 G/DL — LOW (ref 13–17)
HGB BLD-MCNC: 8.9 G/DL — LOW (ref 13–17)
INR BLD: 1.09 — SIGNIFICANT CHANGE UP (ref 0.88–1.16)
INR BLD: 1.16 — SIGNIFICANT CHANGE UP (ref 0.88–1.16)
MAGNESIUM SERPL-MCNC: 2 MG/DL — SIGNIFICANT CHANGE UP (ref 1.6–2.6)
MAGNESIUM SERPL-MCNC: 2.1 MG/DL — SIGNIFICANT CHANGE UP (ref 1.6–2.6)
MCHC RBC-ENTMCNC: 31 PG — SIGNIFICANT CHANGE UP (ref 27–34)
MCHC RBC-ENTMCNC: 31.6 PG — SIGNIFICANT CHANGE UP (ref 27–34)
MCHC RBC-ENTMCNC: 33.7 GM/DL — SIGNIFICANT CHANGE UP (ref 32–36)
MCHC RBC-ENTMCNC: 35 GM/DL — SIGNIFICANT CHANGE UP (ref 32–36)
MCV RBC AUTO: 90.1 FL — SIGNIFICANT CHANGE UP (ref 80–100)
MCV RBC AUTO: 91.9 FL — SIGNIFICANT CHANGE UP (ref 80–100)
NRBC # BLD: 0 /100 WBCS — SIGNIFICANT CHANGE UP (ref 0–0)
NRBC # BLD: 0 /100 WBCS — SIGNIFICANT CHANGE UP (ref 0–0)
PHOSPHATE SERPL-MCNC: 2.2 MG/DL — LOW (ref 2.5–4.5)
PHOSPHATE SERPL-MCNC: 2.9 MG/DL — SIGNIFICANT CHANGE UP (ref 2.5–4.5)
PLATELET # BLD AUTO: 119 K/UL — LOW (ref 150–400)
PLATELET # BLD AUTO: 124 K/UL — LOW (ref 150–400)
POTASSIUM SERPL-MCNC: 4.3 MMOL/L — SIGNIFICANT CHANGE UP (ref 3.5–5.3)
POTASSIUM SERPL-MCNC: 4.3 MMOL/L — SIGNIFICANT CHANGE UP (ref 3.5–5.3)
POTASSIUM SERPL-SCNC: 4.3 MMOL/L — SIGNIFICANT CHANGE UP (ref 3.5–5.3)
POTASSIUM SERPL-SCNC: 4.3 MMOL/L — SIGNIFICANT CHANGE UP (ref 3.5–5.3)
PROT SERPL-MCNC: 5.4 G/DL — LOW (ref 6–8.3)
PROT SERPL-MCNC: 5.4 G/DL — LOW (ref 6–8.3)
PROTHROM AB SERPL-ACNC: 13 SEC — SIGNIFICANT CHANGE UP (ref 10.5–13.4)
PROTHROM AB SERPL-ACNC: 13.8 SEC — HIGH (ref 10.5–13.4)
RBC # BLD: 2.71 M/UL — LOW (ref 4.2–5.8)
RBC # BLD: 2.82 M/UL — LOW (ref 4.2–5.8)
RBC # FLD: 13.1 % — SIGNIFICANT CHANGE UP (ref 10.3–14.5)
RBC # FLD: 13.2 % — SIGNIFICANT CHANGE UP (ref 10.3–14.5)
SODIUM SERPL-SCNC: 137 MMOL/L — SIGNIFICANT CHANGE UP (ref 135–145)
SODIUM SERPL-SCNC: 139 MMOL/L — SIGNIFICANT CHANGE UP (ref 135–145)
WBC # BLD: 4.62 K/UL — SIGNIFICANT CHANGE UP (ref 3.8–10.5)
WBC # BLD: 5.24 K/UL — SIGNIFICANT CHANGE UP (ref 3.8–10.5)
WBC # FLD AUTO: 4.62 K/UL — SIGNIFICANT CHANGE UP (ref 3.8–10.5)
WBC # FLD AUTO: 5.24 K/UL — SIGNIFICANT CHANGE UP (ref 3.8–10.5)

## 2022-03-06 PROCEDURE — 99292 CRITICAL CARE ADDL 30 MIN: CPT

## 2022-03-06 PROCEDURE — 99291 CRITICAL CARE FIRST HOUR: CPT

## 2022-03-06 PROCEDURE — 71045 X-RAY EXAM CHEST 1 VIEW: CPT | Mod: 26

## 2022-03-06 PROCEDURE — 72149 MRI LUMBAR SPINE W/DYE: CPT | Mod: 26

## 2022-03-06 PROCEDURE — 72157 MRI CHEST SPINE W/O & W/DYE: CPT | Mod: 26

## 2022-03-06 RX ORDER — SODIUM CHLORIDE 9 MG/ML
500 INJECTION, SOLUTION INTRAVENOUS
Refills: 0 | Status: DISCONTINUED | OUTPATIENT
Start: 2022-03-06 | End: 2022-03-06

## 2022-03-06 RX ORDER — DEXTROAMPHETAMINE SACCHARATE, AMPHETAMINE ASPARTATE, DEXTROAMPHETAMINE SULFATE AND AMPHETAMINE SULFATE 1.875; 1.875; 1.875; 1.875 MG/1; MG/1; MG/1; MG/1
20 TABLET ORAL
Refills: 0 | Status: DISCONTINUED | OUTPATIENT
Start: 2022-03-06 | End: 2022-03-10

## 2022-03-06 RX ORDER — FENTANYL CITRATE 50 UG/ML
12.5 INJECTION INTRAVENOUS ONCE
Refills: 0 | Status: DISCONTINUED | OUTPATIENT
Start: 2022-03-06 | End: 2022-03-06

## 2022-03-06 RX ORDER — SODIUM CHLORIDE 9 MG/ML
500 INJECTION INTRAMUSCULAR; INTRAVENOUS; SUBCUTANEOUS ONCE
Refills: 0 | Status: COMPLETED | OUTPATIENT
Start: 2022-03-06 | End: 2022-03-06

## 2022-03-06 RX ADMIN — FENTANYL CITRATE 12.5 MICROGRAM(S): 50 INJECTION INTRAVENOUS at 01:45

## 2022-03-06 RX ADMIN — Medication 81 MILLIGRAM(S): at 12:49

## 2022-03-06 RX ADMIN — PANTOPRAZOLE SODIUM 40 MILLIGRAM(S): 20 TABLET, DELAYED RELEASE ORAL at 06:59

## 2022-03-06 RX ADMIN — CLOPIDOGREL BISULFATE 75 MILLIGRAM(S): 75 TABLET, FILM COATED ORAL at 12:49

## 2022-03-06 RX ADMIN — ATORVASTATIN CALCIUM 80 MILLIGRAM(S): 80 TABLET, FILM COATED ORAL at 23:33

## 2022-03-06 RX ADMIN — MIRTAZAPINE 7.5 MILLIGRAM(S): 45 TABLET, ORALLY DISINTEGRATING ORAL at 22:13

## 2022-03-06 RX ADMIN — POLYETHYLENE GLYCOL 3350 17 GRAM(S): 17 POWDER, FOR SOLUTION ORAL at 17:28

## 2022-03-06 RX ADMIN — FENTANYL CITRATE 12.5 MICROGRAM(S): 50 INJECTION INTRAVENOUS at 06:30

## 2022-03-06 RX ADMIN — HEPARIN SODIUM 5000 UNIT(S): 5000 INJECTION INTRAVENOUS; SUBCUTANEOUS at 22:13

## 2022-03-06 RX ADMIN — FENTANYL CITRATE 12.5 MICROGRAM(S): 50 INJECTION INTRAVENOUS at 01:30

## 2022-03-06 RX ADMIN — TAMSULOSIN HYDROCHLORIDE 0.4 MILLIGRAM(S): 0.4 CAPSULE ORAL at 22:13

## 2022-03-06 RX ADMIN — DULOXETINE HYDROCHLORIDE 20 MILLIGRAM(S): 30 CAPSULE, DELAYED RELEASE ORAL at 16:54

## 2022-03-06 RX ADMIN — Medication 2: at 07:52

## 2022-03-06 RX ADMIN — ESCITALOPRAM OXALATE 10 MILLIGRAM(S): 10 TABLET, FILM COATED ORAL at 16:54

## 2022-03-06 RX ADMIN — HEPARIN SODIUM 5000 UNIT(S): 5000 INJECTION INTRAVENOUS; SUBCUTANEOUS at 06:59

## 2022-03-06 RX ADMIN — FENTANYL CITRATE 12.5 MICROGRAM(S): 50 INJECTION INTRAVENOUS at 06:40

## 2022-03-06 RX ADMIN — HEPARIN SODIUM 5000 UNIT(S): 5000 INJECTION INTRAVENOUS; SUBCUTANEOUS at 16:54

## 2022-03-06 RX ADMIN — Medication 2: at 12:30

## 2022-03-06 RX ADMIN — SODIUM CHLORIDE 500 MILLILITER(S): 9 INJECTION INTRAMUSCULAR; INTRAVENOUS; SUBCUTANEOUS at 10:00

## 2022-03-06 NOTE — PROGRESS NOTE ADULT - SUBJECTIVE AND OBJECTIVE BOX
Neurology Stroke Progress Note    INTERVAL HPI/OVERNIGHT EVENTS:  Patient seen and examined. States his left leg still feels weak. Also complains of groin pain on the left side. Discussed MRI brain and that he is ordered for MR T- and L-spine. Denies HA, dizziness, vision changes, nausea, vomiting, or speech changes.     MEDICATIONS  (STANDING):  amphetamine/dextroamphetamine XR 25 milliGRAM(s) Oral with breakfast  aspirin  chewable 81 milliGRAM(s) Oral daily  atorvastatin 80 milliGRAM(s) Oral at bedtime  clopidogrel Tablet 75 milliGRAM(s) Oral daily  dextrose 40% Gel 15 Gram(s) Oral once  dextrose 5%. 1000 milliLiter(s) (50 mL/Hr) IV Continuous <Continuous>  dextrose 5%. 1000 milliLiter(s) (100 mL/Hr) IV Continuous <Continuous>  dextrose 50% Injectable 25 Gram(s) IV Push once  dextrose 50% Injectable 12.5 Gram(s) IV Push once  dextrose 50% Injectable 25 Gram(s) IV Push once  DULoxetine 20 milliGRAM(s) Oral daily  escitalopram 10 milliGRAM(s) Oral daily  glucagon  Injectable 1 milliGRAM(s) IntraMuscular once  heparin   Injectable 5000 Unit(s) SubCutaneous every 8 hours  insulin lispro (ADMELOG) corrective regimen sliding scale   SubCutaneous three times a day before meals  lactated ringers. 500 milliLiter(s) (500 mL/Hr) IV Continuous <Continuous>  mirtazapine 7.5 milliGRAM(s) Oral at bedtime  pantoprazole    Tablet 40 milliGRAM(s) Oral before breakfast  polyethylene glycol 3350 17 Gram(s) Oral daily  sodium chloride 0.9%. 1000 milliLiter(s) (10 mL/Hr) IV Continuous <Continuous>  sodium chloride 0.9%. 1000 milliLiter(s) (50 mL/Hr) IV Continuous <Continuous>  tamsulosin 0.4 milliGRAM(s) Oral at bedtime    MEDICATIONS  (PRN):  acetaminophen     Tablet .. 650 milliGRAM(s) Oral every 6 hours PRN Mild Pain (1 - 3)      Allergies    No Known Allergies    Intolerances        ROS: As per HPI, otherwise negative    Vital Signs Last 24 Hrs  T(C): 36.3 (06 Mar 2022 09:00), Max: 37.1 (05 Mar 2022 22:22)  T(F): 97.4 (06 Mar 2022 09:00), Max: 98.7 (05 Mar 2022 22:22)  HR: 72 (06 Mar 2022 09:00) (71 - 95)  BP: 101/59 (06 Mar 2022 07:00) (80/51 - 137/77)  BP(mean): 74 (06 Mar 2022 07:00) (61 - 102)  RR: 18 (06 Mar 2022 09:00) (16 - 20)  SpO2: 97% (06 Mar 2022 09:00) (93% - 100%)    Physical exam:  Constitutional: No acute distress, conversant  Eyes: Anicteric sclerae, moist conjunctivae, see below for CNs  Neck: trachea midline  Cardiovascular: Regular rate and rhythm, no murmurs, rubs, or gallops.   Pulmonary: Anterior breath sounds clear bilaterally, no crackles or wheezing. No use of accessory muscles  GI: Abdomen soft, non-distended, non-tender  Extremities: Radial and DP pulses +2, no edema, hematoma in left groin, soft.    Neurologic:  -Mental status: Awake, alert, oriented to person, place, and time. Speech is fluent with intact naming, repetition, and comprehension, mild dysarthria. Recent and remote memory intact. Follows commands. Attention/concentration intact. Fund of knowledge appropriate.  -Cranial nerves:   II: Visual fields are full to confrontation.  III, IV, VI: Unable to burry towards right side, baseline per pt. Otherwise, extraocular movements are intact without nystagmus. Pupils equally round and reactive to light  V:  Facial sensation V1-V3 equal and intact   VII: Face is symmetric   Motor: Normal bulk and tone. No pronator drift. Strength bilateral upper extremity 5/5, RLE 5/5, LLE 2/5. Left foot drop (chronic), able to plantarflex 2+/5, knee extension 2+/5, knee flexion 3-/5(trace antigravity movement), minimal hip flexion, able to move leg side to side.  Sensation: Intact to light touch bilaterally. No neglect or extinction on double simultaneous testing.  Coordination: No dysmetria on finger-to-nose b/l, none on heel-to-shin on right side, unable to test left LE   Gait: deferred    NIHSS: 4 ASPECT Score: 8       LABS:                        8.9    5.24  )-----------( 124      ( 06 Mar 2022 02:30 )             25.4     03-06    137  |  104  |  21  ----------------------------<  146<H>  4.3   |  26  |  0.87    Ca    8.5      06 Mar 2022 02:30  Phos  2.9     03-06  Mg     2.1     03-06    TPro  5.4<L>  /  Alb  3.5  /  TBili  0.6  /  DBili  x   /  AST  38  /  ALT  40  /  AlkPhos  51  03-06    PT/INR - ( 06 Mar 2022 02:30 )   PT: 13.0 sec;   INR: 1.09          PTT - ( 06 Mar 2022 02:30 )  PTT:29.5 sec      RADIOLOGY & ADDITIONAL TESTS:  < from: MR Head No Cont (03.05.22 @ 12:10) >    IMPRESSION: Multifocal punctate areas of acute ischemia involving the   cerebellum bilaterally (right greater than left) as well as the right   occipital and parietal lobes bilaterally.  < end of copied text >

## 2022-03-06 NOTE — PROGRESS NOTE ADULT - SUBJECTIVE AND OBJECTIVE BOX
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  He presented to [facility] with SYNCOPE.  He passed out during his physical therapy sessions.  He began to "feel faint" and was told to sit down in a chair that was brought to him, but he didn't make it in time and "fell on the floor and hit his face".    The resulted in some bruising to his right face, and a quarter size hematoma right forehead.  Did not hit his head.    CT head at the OSH was negative for any brain bleed.  No other injuries sustained.  While at [place] he was found to have severe AS and CT Angio.    He was then transferred to [facility] for cardiac cath which showed severe prox and mid-distal LAD, heavily calcified.   In addition to his pre-syncope symptoms, he has been feeling SOB/HEIN recently with symptoms arising after walking one flight of stairs or during minimal exertion, class III NYHA symptoms.    He underwent balloon aortic valvuloplasty and PCI.  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       FAMILY HISTORY:  PAST MEDICAL & SURGICAL HISTORY:  History of aortic stenosis  HTN (hypertension)  Hyperlipidemia  Diabetes mellitus  LYNDA on CPAP  Arthritis  CAD (coronary artery disease)  Ataxia, family, spinal      14 system review was unremarkable    Vital signs, hemodynamic and respiratory parameters were reviewed from the bedside nursing flow sheet.  ICU Vital Signs Last 24 Hrs  T(C): 36.9 (06 Mar 2022 22:04), Max: 36.9 (06 Mar 2022 18:46)  T(F): 98.4 (06 Mar 2022 22:04), Max: 98.4 (06 Mar 2022 18:46)  HR: 96 (06 Mar 2022 21:00) (65 - 100)  BP: 105/57 (06 Mar 2022 21:00) (80/51 - 137/77)  BP(mean): 75 (06 Mar 2022 21:00) (61 - 102)  ABP: 122/51 (06 Mar 2022 09:00) (122/51 - 174/75)  ABP(mean): 73 (06 Mar 2022 09:00) (73 - 112)  RR: 16 (06 Mar 2022 21:00) (16 - 18)  SpO2: 95% (06 Mar 2022 21:00) (94% - 100%)    Adult Advanced Hemodynamics Last 24 Hrs  CVP(mm Hg): --  CVP(cm H2O): --  CO: --  CI: --  PA: --  PA(mean): --  PCWP: --  SVR: --  SVRI: --  PVR: --  PVRI: --, ABG - ( 06 Mar 2022 09:38 )  pH, Arterial: 7.43  pH, Blood: x     /  pCO2: 42    /  pO2: 75    / HCO3: 28    / Base Excess: 3.2   /  SaO2: 96.0                Intake and output was reviewed and the fluid balance was calculated  Daily     Daily   I&O's Summary    05 Mar 2022 07:01  -  06 Mar 2022 07:00  --------------------------------------------------------  IN: 1615 mL / OUT: 2730 mL / NET: -1115 mL    06 Mar 2022 07:01  -  06 Mar 2022 22:50  --------------------------------------------------------  IN: 650 mL / OUT: 1235 mL / NET: -585 mL        All lines and drain sites were assessed    Neuro: No change in the mental status from the baseline. LLE extremity weakness. Able to flex his left knee. Cannot perform dorsiflexion or plantar flexion. Sensation intact.  Neck: No JVD.  CVS: S1, S2, No S3.  Lungs: Good air entry bilaterally.   Abd: Soft. No tenderness. + Bowel sounds.  Vascular: + DP/PT.  Extremities: No edema.  Lymphatic: Normal.  Skin: No abnormalities.      labs  CBC Full  -  ( 06 Mar 2022 09:51 )  WBC Count : 4.62 K/uL  RBC Count : 2.71 M/uL  Hemoglobin : 8.4 g/dL  Hematocrit : 24.9 %  Platelet Count - Automated : 119 K/uL  Mean Cell Volume : 91.9 fl  Mean Cell Hemoglobin : 31.0 pg  Mean Cell Hemoglobin Concentration : 33.7 gm/dL  Auto Neutrophil # : x  Auto Lymphocyte # : x  Auto Monocyte # : x  Auto Eosinophil # : x  Auto Basophil # : x  Auto Neutrophil % : x  Auto Lymphocyte % : x  Auto Monocyte % : x  Auto Eosinophil % : x  Auto Basophil % : x    03-06    139  |  106  |  20  ----------------------------<  171<H>  4.3   |  28  |  0.81    Ca    8.2<L>      06 Mar 2022 09:51  Phos  2.2     03-06  Mg     2.0     03-06    TPro  5.4<L>  /  Alb  3.5  /  TBili  0.5  /  DBili  x   /  AST  34  /  ALT  37  /  AlkPhos  44  03-06    PT/INR - ( 06 Mar 2022 09:51 )   PT: 13.8 sec;   INR: 1.16          PTT - ( 06 Mar 2022 09:51 )  PTT:31.3 sec  The current medications were reviewed   MEDICATIONS  (STANDING):  amphetamine/dextroamphetamine XR 20 milliGRAM(s) Oral with breakfast  aspirin  chewable 81 milliGRAM(s) Oral daily  atorvastatin 80 milliGRAM(s) Oral at bedtime  clopidogrel Tablet 75 milliGRAM(s) Oral daily  dextrose 40% Gel 15 Gram(s) Oral once  dextrose 5%. 1000 milliLiter(s) (100 mL/Hr) IV Continuous <Continuous>  dextrose 5%. 1000 milliLiter(s) (50 mL/Hr) IV Continuous <Continuous>  dextrose 50% Injectable 12.5 Gram(s) IV Push once  dextrose 50% Injectable 25 Gram(s) IV Push once  dextrose 50% Injectable 25 Gram(s) IV Push once  DULoxetine 20 milliGRAM(s) Oral daily  escitalopram 10 milliGRAM(s) Oral daily  glucagon  Injectable 1 milliGRAM(s) IntraMuscular once  heparin   Injectable 5000 Unit(s) SubCutaneous every 8 hours  insulin lispro (ADMELOG) corrective regimen sliding scale   SubCutaneous three times a day before meals  mirtazapine 7.5 milliGRAM(s) Oral at bedtime  pantoprazole    Tablet 40 milliGRAM(s) Oral before breakfast  polyethylene glycol 3350 17 Gram(s) Oral daily  sodium chloride 0.9%. 1000 milliLiter(s) (10 mL/Hr) IV Continuous <Continuous>  sodium chloride 0.9%. 1000 milliLiter(s) (50 mL/Hr) IV Continuous <Continuous>  tamsulosin 0.4 milliGRAM(s) Oral at bedtime    MEDICATIONS  (PRN):  acetaminophen     Tablet .. 650 milliGRAM(s) Oral every 6 hours PRN Mild Pain (1 - 3)            76y old  Male with severe Aortic stenosis and CAD.  S/P BAV  S/P PCI  S/P CFA angioplasty.  Post operative course complicated by left lower extremity weakness.   Stroke Code called.  CT Head: No acute intracranial hemorrhage, mass effect, or CT evidence of recent transcortical infarction.  CT ANGIO head & neck: No large vessel occlusion or high-grade stenosis. 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  MRI lumbar spine showed Mild chronic compression fracture of the L1 vertebral body. Moderate to severe chronic compression fracture of the L2 vertebral body.  Multilevel degenerative changes most notable at L1/L2 and L2/L3 with moderate spinal canal stenosis.  No evidence of spinal cord infarction.  Hemodynamically stable.  Good oxygenation.  Fair urine out put.        My plan includes :  High dose Statin Rx.  Afterload reduction and Betablocker Rx. .  Close hemodynamic, ventilatory and drain monitoring and management  Monitor for arrhythmias and monitor parameters for organ perfusion  Monitor neurologic status  Monitor renal function.  Head of the bed should remain elevated to 45 deg .   Chest PT and IS will be encouraged  Monitor adequacy of oxygenation and ventilation and attempt to wean oxygen  Nutritional goals will be met using po eventually , ensure adequate caloric intake and monitor the same  Stress ulcer and VTE prophylaxis will be achieved    Glycemic control is satisfactory  Electrolytes have been repleted as necessary and wound care has been carried out. Pain control has been achieved.   Aggressive physical therapy and early mobility and ambulation goals will be met   The family was updated about the course and plan  CRITICAL CARE TIME SPENT in evaluation and management, reassessments, review and interpretation of labs and x-rays, ventilator and hemodynamic management, formulating a plan and coordinating care: ___30____ MIN.  Time does not include procedural time.  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  He presented to [facility] with SYNCOPE.  He passed out during his physical therapy sessions.  He began to "feel faint" and was told to sit down in a chair that was brought to him, but he didn't make it in time and "fell on the floor and hit his face".    The resulted in some bruising to his right face, and a quarter size hematoma right forehead.  Did not hit his head.    CT head at the OSH was negative for any brain bleed.  No other injuries sustained.  While at [place] he was found to have severe AS and CT Angio.    He was then transferred to [facility] for cardiac cath which showed severe prox and mid-distal LAD, heavily calcified.   In addition to his pre-syncope symptoms, he has been feeling SOB/HEIN recently with symptoms arising after walking one flight of stairs or during minimal exertion, class III NYHA symptoms.    He underwent balloon aortic valvuloplasty and PCI.  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       FAMILY HISTORY:  PAST MEDICAL & SURGICAL HISTORY:  History of aortic stenosis  HTN (hypertension)  Hyperlipidemia  Diabetes mellitus  LYNDA on CPAP  Arthritis  CAD (coronary artery disease)  Ataxia, family, spinal      14 system review was unremarkable    Vital signs, hemodynamic and respiratory parameters were reviewed from the bedside nursing flow sheet.  ICU Vital Signs Last 24 Hrs  T(C): 36.9 (06 Mar 2022 22:04), Max: 36.9 (06 Mar 2022 18:46)  T(F): 98.4 (06 Mar 2022 22:04), Max: 98.4 (06 Mar 2022 18:46)  HR: 96 (06 Mar 2022 21:00) (65 - 100)  BP: 105/57 (06 Mar 2022 21:00) (80/51 - 137/77)  BP(mean): 75 (06 Mar 2022 21:00) (61 - 102)  ABP: 122/51 (06 Mar 2022 09:00) (122/51 - 174/75)  ABP(mean): 73 (06 Mar 2022 09:00) (73 - 112)  RR: 16 (06 Mar 2022 21:00) (16 - 18)  SpO2: 95% (06 Mar 2022 21:00) (94% - 100%)    Adult Advanced Hemodynamics Last 24 Hrs  CVP(mm Hg): --  CVP(cm H2O): --  CO: --  CI: --  PA: --  PA(mean): --  PCWP: --  SVR: --  SVRI: --  PVR: --  PVRI: --, ABG - ( 06 Mar 2022 09:38 )  pH, Arterial: 7.43  pH, Blood: x     /  pCO2: 42    /  pO2: 75    / HCO3: 28    / Base Excess: 3.2   /  SaO2: 96.0                Intake and output was reviewed and the fluid balance was calculated  Daily     Daily   I&O's Summary    05 Mar 2022 07:01  -  06 Mar 2022 07:00  --------------------------------------------------------  IN: 1615 mL / OUT: 2730 mL / NET: -1115 mL    06 Mar 2022 07:01  -  06 Mar 2022 22:50  --------------------------------------------------------  IN: 650 mL / OUT: 1235 mL / NET: -585 mL        All lines and drain sites were assessed    Neuro: No change in the mental status from the baseline. LLE extremity weakness. Able to flex his left knee. Cannot perform dorsiflexion or plantar flexion. Sensation intact.  Right periorbital bruising and hematoma formation.  Neck: No JVD.  CVS: S1, S2, No S3.  Lungs: Good air entry bilaterally.   Abd: Soft. No tenderness. + Bowel sounds. Right groin hematoma.  Vascular: + DP/PT.  Extremities: No edema.  Lymphatic: Normal.  Skin: No abnormalities.      labs  CBC Full  -  ( 06 Mar 2022 09:51 )  WBC Count : 4.62 K/uL  RBC Count : 2.71 M/uL  Hemoglobin : 8.4 g/dL  Hematocrit : 24.9 %  Platelet Count - Automated : 119 K/uL  Mean Cell Volume : 91.9 fl  Mean Cell Hemoglobin : 31.0 pg  Mean Cell Hemoglobin Concentration : 33.7 gm/dL  Auto Neutrophil # : x  Auto Lymphocyte # : x  Auto Monocyte # : x  Auto Eosinophil # : x  Auto Basophil # : x  Auto Neutrophil % : x  Auto Lymphocyte % : x  Auto Monocyte % : x  Auto Eosinophil % : x  Auto Basophil % : x    03-06    139  |  106  |  20  ----------------------------<  171<H>  4.3   |  28  |  0.81    Ca    8.2<L>      06 Mar 2022 09:51  Phos  2.2     03-06  Mg     2.0     03-06    TPro  5.4<L>  /  Alb  3.5  /  TBili  0.5  /  DBili  x   /  AST  34  /  ALT  37  /  AlkPhos  44  03-06    PT/INR - ( 06 Mar 2022 09:51 )   PT: 13.8 sec;   INR: 1.16          PTT - ( 06 Mar 2022 09:51 )  PTT:31.3 sec  The current medications were reviewed   MEDICATIONS  (STANDING):  amphetamine/dextroamphetamine XR 20 milliGRAM(s) Oral with breakfast  aspirin  chewable 81 milliGRAM(s) Oral daily  atorvastatin 80 milliGRAM(s) Oral at bedtime  clopidogrel Tablet 75 milliGRAM(s) Oral daily  dextrose 40% Gel 15 Gram(s) Oral once  dextrose 5%. 1000 milliLiter(s) (100 mL/Hr) IV Continuous <Continuous>  dextrose 5%. 1000 milliLiter(s) (50 mL/Hr) IV Continuous <Continuous>  dextrose 50% Injectable 12.5 Gram(s) IV Push once  dextrose 50% Injectable 25 Gram(s) IV Push once  dextrose 50% Injectable 25 Gram(s) IV Push once  DULoxetine 20 milliGRAM(s) Oral daily  escitalopram 10 milliGRAM(s) Oral daily  glucagon  Injectable 1 milliGRAM(s) IntraMuscular once  heparin   Injectable 5000 Unit(s) SubCutaneous every 8 hours  insulin lispro (ADMELOG) corrective regimen sliding scale   SubCutaneous three times a day before meals  mirtazapine 7.5 milliGRAM(s) Oral at bedtime  pantoprazole    Tablet 40 milliGRAM(s) Oral before breakfast  polyethylene glycol 3350 17 Gram(s) Oral daily  sodium chloride 0.9%. 1000 milliLiter(s) (10 mL/Hr) IV Continuous <Continuous>  sodium chloride 0.9%. 1000 milliLiter(s) (50 mL/Hr) IV Continuous <Continuous>  tamsulosin 0.4 milliGRAM(s) Oral at bedtime    MEDICATIONS  (PRN):  acetaminophen     Tablet .. 650 milliGRAM(s) Oral every 6 hours PRN Mild Pain (1 - 3)            76y old  Male with severe Aortic stenosis and CAD.  S/P BAV  S/P PCI  S/P CFA angioplasty.  Post operative course complicated by left lower extremity weakness.   Stroke Code called.  CT Head: No acute intracranial hemorrhage, mass effect, or CT evidence of recent transcortical infarction.  CT ANGIO head & neck: No large vessel occlusion or high-grade stenosis. 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  MRI lumbar spine showed Mild chronic compression fracture of the L1 vertebral body. Moderate to severe chronic compression fracture of the L2 vertebral body.  Multilevel degenerative changes most notable at L1/L2 and L2/L3 with moderate spinal canal stenosis.  No evidence of spinal cord infarction.  Hemodynamically stable.  Good oxygenation.  Fair urine out put.        My plan includes :  High dose Statin Rx.  Afterload reduction and Betablocker Rx. .  Close hemodynamic, ventilatory and drain monitoring and management  Monitor for arrhythmias and monitor parameters for organ perfusion  Monitor neurologic status  Monitor renal function.  Head of the bed should remain elevated to 45 deg .   Chest PT and IS will be encouraged  Monitor adequacy of oxygenation and ventilation and attempt to wean oxygen  Nutritional goals will be met using po eventually , ensure adequate caloric intake and monitor the same  Stress ulcer and VTE prophylaxis will be achieved    Glycemic control is satisfactory  Electrolytes have been repleted as necessary and wound care has been carried out. Pain control has been achieved.   Aggressive physical therapy and early mobility and ambulation goals will be met   The family was updated about the course and plan  CRITICAL CARE TIME SPENT in evaluation and management, reassessments, review and interpretation of labs and x-rays, ventilator and hemodynamic management, formulating a plan and coordinating care: ___30____ MIN.  Time does not include procedural time.  CTICU ATTENDING     					    Narciso Lozano MD

## 2022-03-06 NOTE — PROGRESS NOTE ADULT - SUBJECTIVE AND OBJECTIVE BOX
Subjective:     ROS:   Denies Headache, blurred vision, Chest Pain, SOB, Abdominal pain, nausea or vomiting     Social   aspirin  chewable 81  clopidogrel Tablet 75  heparin   Injectable 5000  tamsulosin 0.4      Allergies    No Known Allergies    Intolerances        Vital Signs Last 24 Hrs  T(C): 36.3 (06 Mar 2022 09:00), Max: 37.1 (05 Mar 2022 22:22)  T(F): 97.4 (06 Mar 2022 09:00), Max: 98.7 (05 Mar 2022 22:22)  HR: 72 (06 Mar 2022 09:00) (68 - 95)  BP: 101/59 (06 Mar 2022 07:00) (80/51 - 137/77)  BP(mean): 74 (06 Mar 2022 07:00) (61 - 102)  RR: 18 (06 Mar 2022 09:00) (16 - 20)  SpO2: 97% (06 Mar 2022 09:00) (93% - 100%)  I&O's Summary    05 Mar 2022 07:01  -  06 Mar 2022 07:00  --------------------------------------------------------  IN: 1615 mL / OUT: 2730 mL / NET: -1115 mL    06 Mar 2022 07:01  -  06 Mar 2022 11:01  --------------------------------------------------------  IN: 0 mL / OUT: 125 mL / NET: -125 mL        Physical Exam:  General: NAD  Pulmonary: Nonlabored breathing, no respiratory distress  Cardiovascular: NSR  Abdominal: soft, NT/ND, no organomegaly  Groin; soft, dressing clean dry and intact, no swelling or ecchymosis.  Extremities: WWP, SCDs in place, Palp DP/PT pulses bilaterally.  Neuro/MSK: Slightly slurred speech that seems to be patient's baseline.  LLE hip flexors 3/5, ankle dorsiflexion/plantar flexion 3/5, EHL/FHL 4/5. Sensation intact throughout LLE.       LABS:                        8.4    4.62  )-----------( 119      ( 06 Mar 2022 09:51 )             24.9     03-06    139  |  106  |  20  ----------------------------<  171<H>  4.3   |  28  |  0.81    Ca    8.2<L>      06 Mar 2022 09:51  Phos  2.2     03-06  Mg     2.0     03-06    TPro  5.4<L>  /  Alb  3.5  /  TBili  0.5  /  DBili  x   /  AST  34  /  ALT  37  /  AlkPhos  44  03-06    PT/INR - ( 06 Mar 2022 09:51 )   PT: 13.8 sec;   INR: 1.16          PTT - ( 06 Mar 2022 09:51 )  PTT:31.3 sec    Radiology:   MRI Head:  IMPRESSION: Multifocal punctate areas of acute ischemia involving the cerebellum bilaterally (right greater than left) as well as the right occipital and parietal lobes bilaterally.  CT A/P w/ and w/o Contrast:  Impression:  1. Status post TAVR with left groin access. Small filling defect within the proximal left profunda likely representing an embolic clot. Small subcutaneous hematoma. No pseudoaneurysm.  2. Left medial thigh hematoma measuring up to 5 cm in maximum dimension.    Assessment/Plan:  75yo M undergoing BAV/PCI today complicated by failed closure device and left groin bleeding, intra-op found to have left KURTIS pseudoaneurysm s/p balloon occlusion now with resolved flow in the pseudoaneurysm. On POD#1 patient complaining of being unable to move left leg, no pain and sensation intact throughout. CTAP performed 3/5 demonstrated Left medial thigh hematoma and no collection over groin puncture site. MRI brain done 3/5 showed Multifocal punctate areas of acute ischemia involving the cerebellum bilaterally (right greater than left) as well as the right occipital and parietal lobes bilaterally. Pending MRI of T and L spine.     - No indication for any further vascular intervention at this time  - Motor exam this morning with slight improvement  - Frequent neurochecks  - Monitor groins    - f/u neurology recs  - f/u MRI T/L spine  - Vascular will continue to follow

## 2022-03-06 NOTE — PROGRESS NOTE ADULT - SUBJECTIVE AND OBJECTIVE BOX
INTERVAL HPI/OVERNIGHT EVENTS:    3/4: PCI x 2/BAV; balloon to CFA  EF normal     77yo Male Hx tobacco use, HTN, HLD, DM, CAD/MI/stent, ataxia (uses walker), Prostate CA - XRT in remission, LYNDA/CPAP, arthritis - chronic neck/back pain - regular PT/nerve blocks w/near syncope and syncopal episodes     Pt fell to floor during one of these episode  - hit his face  CT Head - no hemorrhage or intracranial injury    At [place] - w/u found - severe AS  Transferred to [facility] for Cath via right femoral artery - severe prox and mid-distal LAD, heavily calcified.    to OR today - PCI/BAV performed ; angiogram - pseudoaneurysm left KURTIS - balloon x 10 min with resolution of flow in pseudoaneurysm on completion angio. 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 of the supraclinoid segment of the right internal carotid artery    no noted dramatic changes (hyper or hypotension reported overnight) - pressor requirement post-procedure - titrated off (1a) and has remained off for several hours with stable hemodynamics    symptoms demonstrating some improvement over time - able to abduct/adduct leg and some slight flexion at hip/knee but demonstrably weaker than right - marked change c/w exam immediately post-op     patient awake/alert and interactive - speaking with his physician in [place] at this time providing an update via phone    CTa A/P 3/5 : Small filling defect within the proximal left profunda likely representing an embolic clot. Small   subcutaneous hematoma. No pseudoaneurysm. Left medial thigh hematoma (5 cm). Marked bladder wall thickening with few diverticula. Chun in place. Large amount of stool in colon  reviewed by vascular - not etiology    given few hours of IVF in light of dye load - now off    MRI 3/5: Multifocal punctate areas of acute ischemia involving the cerebellum bilaterally (right greater than left) as well as the right occipital and parietal lobes bilaterally.    d/w Neuro - findings not c/w physical exam findings - rec MRI LS spine (ordered)    No acute events reported overnight - chronic CPAP for LYNDA utilized  cardene for BP control     PMHx includes but is not limited to:  History of aortic stenosis  HTN (hypertension)  Hyperlipidemia  Diabetes mellitus  LYNDA on CPAP  Arthritis  CAD (coronary artery disease)  Ataxia, spinal; left foot drop (chronic)    ICU Vital Signs Last 24 Hrs  T(C): 36.4 (06 Mar 2022 05:01), Max: 37.1 (05 Mar 2022 22:22)  T(F): 97.6 (06 Mar 2022 05:01), Max: 98.7 (05 Mar 2022 22:22)  HR: 72 (06 Mar 2022 08:00) (71 - 95) sinus   BP: 101/59 (06 Mar 2022 07:00) (80/51 - 137/77)  BP(mean): 74 (06 Mar 2022 07:00) (61 - 102)  ABP: 137/58 (06 Mar 2022 08:00) (107/45 - 174/75)  ABP(mean): 83 (06 Mar 2022 08:00) (65 - 112)  RR: 16 (06 Mar 2022 08:00) (16 - 20)  SpO2: 94% (06 Mar 2022 08:00) (93% - 100%) RA    Qtts: None    I&O's Summary    05 Mar 2022 07:01  -  06 Mar 2022 07:00  --------------------------------------------------------  IN: 1615 mL / OUT: 2730 mL / NET: -1115 mL    06 Mar 2022 07:01  -  06 Mar 2022 09:00  --------------------------------------------------------  IN: 0 mL / OUT: 0 mL / NET: 0 mL    Physical Exam    Heart  Lungs  Abd  Ext  Chest  Neuro  Skin    LABS:                        8.9    5.24  )-----------( 124      ( 06 Mar 2022 02:30 )             25.4     03-06    137  |  104  |  21  ----------------------------<  146<H>  4.3   |  26  |  0.87    Ca    8.5      06 Mar 2022 02:30  Phos  2.9     03-06  Mg     2.1     03-06    TPro  5.4<L>  /  Alb  3.5  /  TBili  0.6  /  DBili  x   /  AST  38  /  ALT  40  /  AlkPhos  51  03-06    PT/INR - ( 06 Mar 2022 02:30 )   PT: 13.0 sec;   INR: 1.09          PTT - ( 06 Mar 2022 02:30 )  PTT:29.5 sec    ABG - ( 06 Mar 2022 02:18 )  pH, Arterial: 7.38  pH, Blood: x     /  pCO2: 47    /  pO2: 176   / HCO3: 28    / Base Excess: 2.0   /  SaO2: 96.9                RADIOLOGY & ADDITIONAL STUDIES:    I have spent/provided stated minutes of critical care time to this patient:  INTERVAL HPI/OVERNIGHT EVENTS:    3/4: PCI x 2/BAV; balloon to CFA  EF normal     75yo Male Hx tobacco use, HTN, HLD, DM, CAD/MI/stent, ataxia (uses walker), Prostate CA - XRT in remission, LYNDA/CPAP, arthritis - chronic neck/back pain - regular PT/nerve blocks w/near syncope and syncopal episodes     Pt fell to floor during one of these episode  - hit his face  CT Head - no hemorrhage or intracranial injury    At [place] - w/u found - severe AS  Transferred to [facility] for Cath via right femoral artery - severe prox and mid-distal LAD, heavily calcified.    to OR today - PCI/BAV performed ; angiogram - pseudoaneurysm left KURTIS - balloon x 10 min with resolution of flow in pseudoaneurysm on completion angio. 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 of the supraclinoid segment of the right internal carotid artery    no noted dramatic changes (hyper or hypotension reported overnight) - pressor requirement post-procedure - titrated off (1a) and has remained off for several hours with stable hemodynamics    symptoms demonstrating some improvement over time - able to abduct/adduct leg and some slight flexion at hip/knee but demonstrably weaker than right - marked change c/w exam immediately post-op     patient awake/alert and interactive - speaking with his physician in [place] at this time providing an update via phone    CTa A/P 3/5 : Small filling defect within the proximal left profunda likely representing an embolic clot. Small   subcutaneous hematoma. No pseudoaneurysm. Left medial thigh hematoma (5 cm). Marked bladder wall thickening with few diverticula. Chun in place. Large amount of stool in colon  reviewed by vascular - not etiology    given few hours of IVF in light of dye load - now off    MRI 3/5: Multifocal punctate areas of acute ischemia involving the cerebellum bilaterally (right greater than left) as well as the right occipital and parietal lobes bilaterally.    d/w Neuro - findings not c/w physical exam findings - rec MRI LS spine (ordered)    No acute events reported overnight - chronic CPAP for LYNDA utilized  cardene for BP control     PMHx includes but is not limited to:  History of aortic stenosis  HTN (hypertension)  Hyperlipidemia  Diabetes mellitus  LYNDA on CPAP  Arthritis  CAD (coronary artery disease)  Ataxia, spinal; left foot drop (chronic)    ICU Vital Signs Last 24 Hrs  T(C): 36.4 (06 Mar 2022 05:01), Max: 37.1 (05 Mar 2022 22:22)  T(F): 97.6 (06 Mar 2022 05:01), Max: 98.7 (05 Mar 2022 22:22)  HR: 72 (06 Mar 2022 08:00) (71 - 95) sinus   BP: 101/59 (06 Mar 2022 07:00) (80/51 - 137/77)  BP(mean): 74 (06 Mar 2022 07:00) (61 - 102)  ABP: 137/58 (06 Mar 2022 08:00) (107/45 - 174/75)  ABP(mean): 83 (06 Mar 2022 08:00) (65 - 112)  RR: 16 (06 Mar 2022 08:00) (16 - 20)  SpO2: 94% (06 Mar 2022 08:00) (93% - 100%) RA    Qtts: None    I&O's Summary    05 Mar 2022 07:01  -  06 Mar 2022 07:00  --------------------------------------------------------  IN: 1615 mL / OUT: 2730 mL / NET: -1115 mL    06 Mar 2022 07:01  -  06 Mar 2022 09:00  --------------------------------------------------------  IN: 0 mL / OUT: 0 mL / NET: 0 mL    Physical Exam    Heart - regular (-)rub/gallop  Lungs - CTA anterior/laterally - no rhonchi/wheeze   Abd - (+)BS obese/soft - NTND (-)r/r/g  Ext - warm to touch; no cyanosis/clubbing; chronic stasis changes LE bilaterally ; inguinal site with some ecchymosis but soft to touch; no palpable tenderness/crepitus; no erythema  Neuro - alert/oriented and interactive - following simple commands 5/5 strength in RLE ; 3/5 motor strength in LLE ; sensory intact UE/LE  Skin - no rash    LABS:                        8.9    5.24  )-----------( 124      ( 06 Mar 2022 02:30 )             25.4     03-06    137  |  104  |  21  ----------------------------<  146<H>  4.3   |  26  |  0.87    Ca    8.5      06 Mar 2022 02:30  Phos  2.9     03-06  Mg     2.1     03-06    TPro  5.4<L>  /  Alb  3.5  /  TBili  0.6  /  DBili  x   /  AST  38  /  ALT  40  /  AlkPhos  51  03-06    PT/INR - ( 06 Mar 2022 02:30 )   PT: 13.0 sec;   INR: 1.09     PTT - ( 06 Mar 2022 02:30 )  PTT:29.5 sec    ABG - ( 06 Mar 2022 02:18 )  pH, Arterial: 7.38  pH, Blood: x     /  pCO2: 47    /  pO2: 176   / HCO3: 28    / Base Excess: 2.0   /  SaO2: 96.9      RADIOLOGY & ADDITIONAL STUDIES: reviewed    Patient with presyncopal and syncopal episode and severe aortic stenosis with CAD - now s/p PCI - stent x 2/BAV and balloon angio of left CFA pseudoaneurysm with intraop resolution. Now with new onset LLE weakness - unexplained to date    1. CV  sinus rhythm   developed hypotension post BAV - requiring pressors - titrated off 1a and hemodynamically stable  ASA/Plavix load given 3/4 and on daily ASA/Plavix/statin - (+) stents   ECHO 3/5 performed     2. Neuro/Vascular   new onset LLE weakness  code stroke called - imaging - no acute intracranial abnormality or stenosis  plan MRI - already on ASA/Plaxis//high dose statin  No sensory component - sensory intact - ? impingement or exacerbation of baseline neuro deficits - baseline ataxia and left foot drop (ambulates with walker)  patient reported he sustained a fall in past and was told compression fractures where the cause of his foot drop  also receives spinal injections and ablations - last fall of 2021 per patient  followed by Neuro - Dr Escobar at [facility] and Physiatrist Dr Rose Adkins - to obtain MRI today and contact them to clarify baseline pathology ; Also sees neurophthomologist     maintain glycemic control ; ISS  restart home meds   severe constipation - inc Bowel regimen post-MRI today  severe urinary retention - required chun placement with 3L retained periprocedural - on flomax - assess for TOV soon    DVT and GI prophylaxis    d/w patient/staff/structural heart attending    I have spent/provided stated minutes of critical care time to this patient: 90

## 2022-03-06 NOTE — PROGRESS NOTE ADULT - ASSESSMENT
Assessment and Plan  76y Male with PMHx of HTN, HLD, NIDDM, "mild" MI in the past requiring a stent (diagonal branch), spinal ataxia (uses a walker at baseline), prostate CA s/p radiation, in remission x 8-9 years, LYNDA on CPAP at home with good compliance, chronic arthritis with neck and back pain presented to [facility] for feeling faint, found to have severe AS on CT angio, transferred to [facility] for cardiac cath on 3/2 which showed severe prox and mid-distal LAD, POD 1 for BAV/PCI. Stroke code called for acute left leg paralysis, NIHSS initially 4, decreased to 3 after CT scan. 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 Not tpa candidate due to recent procedure, symptoms mildly improved.   MRI brain done yesterday shows very tiny embolic strokes in b/l parietal lobes, right occipital, and b/l cerebellum, however the size and location of the strokes seen on MRI do not explain patient's left leg weakness.   Therefore, recommended MR T- and L- spine to workup additional etiology    1)Secondary stroke prevention  - continue ASA 81mg PO daily and Plavix 75mg PO daily, for secondary stroke prevention  - continue Atorvastatin 80mg PO daily    2) Stroke risk factors  - A1C: 6.4  - LDL: 28  - HTN, HLD    3) Further management  - may maintain MAP of 90 to maintain cerebral perfusion  - recommend q1hr stroke neuro checks  - recommend MR T- and L- spine  - provide stroke education    DVT prophylaxis   -Heparin SQ and SCDs    Discussed with Neurology Attending Dr. Colvin

## 2022-03-07 LAB
ALBUMIN SERPL ELPH-MCNC: 3.5 G/DL — SIGNIFICANT CHANGE UP (ref 3.3–5)
ALP SERPL-CCNC: 45 U/L — SIGNIFICANT CHANGE UP (ref 40–120)
ALT FLD-CCNC: 36 U/L — SIGNIFICANT CHANGE UP (ref 10–45)
ANION GAP SERPL CALC-SCNC: 8 MMOL/L — SIGNIFICANT CHANGE UP (ref 5–17)
APTT BLD: 30.9 SEC — SIGNIFICANT CHANGE UP (ref 27.5–35.5)
AST SERPL-CCNC: 32 U/L — SIGNIFICANT CHANGE UP (ref 10–40)
BILIRUB SERPL-MCNC: 0.6 MG/DL — SIGNIFICANT CHANGE UP (ref 0.2–1.2)
BUN SERPL-MCNC: 21 MG/DL — SIGNIFICANT CHANGE UP (ref 7–23)
CALCIUM SERPL-MCNC: 8.2 MG/DL — LOW (ref 8.4–10.5)
CHLORIDE SERPL-SCNC: 108 MMOL/L — SIGNIFICANT CHANGE UP (ref 96–108)
CO2 SERPL-SCNC: 26 MMOL/L — SIGNIFICANT CHANGE UP (ref 22–31)
CREAT SERPL-MCNC: 1.05 MG/DL — SIGNIFICANT CHANGE UP (ref 0.5–1.3)
EGFR: 73 ML/MIN/1.73M2 — SIGNIFICANT CHANGE UP
GLUCOSE BLDC GLUCOMTR-MCNC: 153 MG/DL — HIGH (ref 70–99)
GLUCOSE BLDC GLUCOMTR-MCNC: 199 MG/DL — HIGH (ref 70–99)
GLUCOSE BLDC GLUCOMTR-MCNC: 212 MG/DL — HIGH (ref 70–99)
GLUCOSE BLDC GLUCOMTR-MCNC: 228 MG/DL — HIGH (ref 70–99)
GLUCOSE SERPL-MCNC: 164 MG/DL — HIGH (ref 70–99)
HCT VFR BLD CALC: 24.4 % — LOW (ref 39–50)
HGB BLD-MCNC: 8.3 G/DL — LOW (ref 13–17)
INR BLD: 1.14 — SIGNIFICANT CHANGE UP (ref 0.88–1.16)
MAGNESIUM SERPL-MCNC: 1.9 MG/DL — SIGNIFICANT CHANGE UP (ref 1.6–2.6)
MCHC RBC-ENTMCNC: 31.6 PG — SIGNIFICANT CHANGE UP (ref 27–34)
MCHC RBC-ENTMCNC: 34 GM/DL — SIGNIFICANT CHANGE UP (ref 32–36)
MCV RBC AUTO: 92.8 FL — SIGNIFICANT CHANGE UP (ref 80–100)
NRBC # BLD: 0 /100 WBCS — SIGNIFICANT CHANGE UP (ref 0–0)
PHOSPHATE SERPL-MCNC: 2.2 MG/DL — LOW (ref 2.5–4.5)
PLATELET # BLD AUTO: 131 K/UL — LOW (ref 150–400)
POTASSIUM SERPL-MCNC: 4.2 MMOL/L — SIGNIFICANT CHANGE UP (ref 3.5–5.3)
POTASSIUM SERPL-SCNC: 4.2 MMOL/L — SIGNIFICANT CHANGE UP (ref 3.5–5.3)
PROT SERPL-MCNC: 5.3 G/DL — LOW (ref 6–8.3)
PROTHROM AB SERPL-ACNC: 13.6 SEC — HIGH (ref 10.5–13.4)
RBC # BLD: 2.63 M/UL — LOW (ref 4.2–5.8)
RBC # FLD: 13.1 % — SIGNIFICANT CHANGE UP (ref 10.3–14.5)
SODIUM SERPL-SCNC: 142 MMOL/L — SIGNIFICANT CHANGE UP (ref 135–145)
WBC # BLD: 4.34 K/UL — SIGNIFICANT CHANGE UP (ref 3.8–10.5)
WBC # FLD AUTO: 4.34 K/UL — SIGNIFICANT CHANGE UP (ref 3.8–10.5)

## 2022-03-07 PROCEDURE — 99233 SBSQ HOSP IP/OBS HIGH 50: CPT

## 2022-03-07 PROCEDURE — 99222 1ST HOSP IP/OBS MODERATE 55: CPT

## 2022-03-07 PROCEDURE — 99291 CRITICAL CARE FIRST HOUR: CPT

## 2022-03-07 PROCEDURE — 71045 X-RAY EXAM CHEST 1 VIEW: CPT | Mod: 26

## 2022-03-07 RX ORDER — ALBUMIN HUMAN 25 %
250 VIAL (ML) INTRAVENOUS
Refills: 0 | Status: COMPLETED | OUTPATIENT
Start: 2022-03-07 | End: 2022-03-07

## 2022-03-07 RX ORDER — LANOLIN ALCOHOL/MO/W.PET/CERES
5 CREAM (GRAM) TOPICAL AT BEDTIME
Refills: 0 | Status: DISCONTINUED | OUTPATIENT
Start: 2022-03-07 | End: 2022-03-10

## 2022-03-07 RX ORDER — MAGNESIUM OXIDE 400 MG ORAL TABLET 241.3 MG
400 TABLET ORAL ONCE
Refills: 0 | Status: COMPLETED | OUTPATIENT
Start: 2022-03-07 | End: 2022-03-07

## 2022-03-07 RX ORDER — MAGNESIUM SULFATE 500 MG/ML
2 VIAL (ML) INJECTION ONCE
Refills: 0 | Status: COMPLETED | OUTPATIENT
Start: 2022-03-07 | End: 2022-03-07

## 2022-03-07 RX ADMIN — PANTOPRAZOLE SODIUM 40 MILLIGRAM(S): 20 TABLET, DELAYED RELEASE ORAL at 06:18

## 2022-03-07 RX ADMIN — Medication 4: at 17:24

## 2022-03-07 RX ADMIN — ESCITALOPRAM OXALATE 10 MILLIGRAM(S): 10 TABLET, FILM COATED ORAL at 12:58

## 2022-03-07 RX ADMIN — Medication 81 MILLIGRAM(S): at 15:42

## 2022-03-07 RX ADMIN — HEPARIN SODIUM 5000 UNIT(S): 5000 INJECTION INTRAVENOUS; SUBCUTANEOUS at 06:18

## 2022-03-07 RX ADMIN — CLOPIDOGREL BISULFATE 75 MILLIGRAM(S): 75 TABLET, FILM COATED ORAL at 15:43

## 2022-03-07 RX ADMIN — Medication 62.5 MILLIMOLE(S): at 06:17

## 2022-03-07 RX ADMIN — Medication 2: at 07:20

## 2022-03-07 RX ADMIN — Medication 25 GRAM(S): at 03:58

## 2022-03-07 RX ADMIN — Medication 4: at 22:26

## 2022-03-07 RX ADMIN — MIRTAZAPINE 7.5 MILLIGRAM(S): 45 TABLET, ORALLY DISINTEGRATING ORAL at 22:26

## 2022-03-07 RX ADMIN — DULOXETINE HYDROCHLORIDE 20 MILLIGRAM(S): 30 CAPSULE, DELAYED RELEASE ORAL at 12:58

## 2022-03-07 RX ADMIN — HEPARIN SODIUM 5000 UNIT(S): 5000 INJECTION INTRAVENOUS; SUBCUTANEOUS at 22:27

## 2022-03-07 RX ADMIN — Medication 2: at 13:00

## 2022-03-07 RX ADMIN — Medication 125 MILLILITER(S): at 01:00

## 2022-03-07 RX ADMIN — Medication 125 MILLILITER(S): at 02:09

## 2022-03-07 RX ADMIN — ATORVASTATIN CALCIUM 80 MILLIGRAM(S): 80 TABLET, FILM COATED ORAL at 22:26

## 2022-03-07 RX ADMIN — POLYETHYLENE GLYCOL 3350 17 GRAM(S): 17 POWDER, FOR SOLUTION ORAL at 12:58

## 2022-03-07 RX ADMIN — Medication 5 MILLIGRAM(S): at 00:00

## 2022-03-07 RX ADMIN — HEPARIN SODIUM 5000 UNIT(S): 5000 INJECTION INTRAVENOUS; SUBCUTANEOUS at 15:42

## 2022-03-07 RX ADMIN — TAMSULOSIN HYDROCHLORIDE 0.4 MILLIGRAM(S): 0.4 CAPSULE ORAL at 22:26

## 2022-03-07 NOTE — OCCUPATIONAL THERAPY INITIAL EVALUATION ADULT - PERTINENT HX OF CURRENT PROBLEM, REHAB EVAL
76M  goes to physical therapy regularly and has had nerve blocks for his back pain in the past, who presented to [facility] because while at one of his physical therapy sessions he began to "feel faint" and was told to sit down in a chair that was brought to him, but he didn't make it in time and "fell on the floor and hit his face".. While at [place] he was found to have severe AS and CT Angio. Pt s/p balloon aortic valvuloplasty and PCI 3/4/22.

## 2022-03-07 NOTE — PROGRESS NOTE ADULT - ATTENDING COMMENTS
The patient is a 76-year-old gentleman with multiple comorbidities admitted 3/2 for elevated aortic valvuloplasty and PCI performed on 3/4.  Course complicated by new onset left lower extremity weakness complicated by left CFA pseudoaneurysm s/p angiogram and angioplasty and left groin hematoma.  MRI brain revealed multiple, small embolic appearing strokes in the posterior circulation but none that would explain his symptoms.  MRI thoracic and lumbar spine was unrevealing. CT A/P showed medial thigh hematoma which would was not thought to explain symptoms. He has had no back/leg pain apart from left groin pain.    The patient's exam is notable ataxic dysarthria (baseline). UE strenght is intact.  R LE strength is intact. L LE: 2/5 weakness of L Iliopsoas, 4+/5 adduction, 4/5 abduction 4+/5 knee extension, 4/5 knee flexion, 0/5 dorsiflexion, eversion, inversion with foot weakness being chronic. Reflexes are absent at ankles, symmetric and present at knees. Toes is mute on L, equivocal on R. Decreased sensation to pin on L leg (no appreciable pattern) with possibly reduced proprioception on R LE.  He does endorse left groin pain with proximal thigh movement. The patient is a 76-year-old gentleman with multiple comorbidities including spinocerebellar ataxia admitted 3/2 for elevated aortic valvuloplasty and PCI performed on 3/4.  Course complicated by new onset left lower extremity weakness complicated by left CFA pseudoaneurysm s/p angiogram and angioplasty and left groin hematoma.  MRI brain revealed multiple, small embolic appearing strokes in the posterior circulation but none that would explain his symptoms.  MRI thoracic and lumbar spine was unrevealing. CT A/P showed medial thigh hematoma which would was not thought to explain symptoms. He has had no back/leg pain apart from left groin pain.    The patient's exam is notable ataxic dysarthria (baseline). UE strenght is intact.  R LE strength is intact. L LE: 2/5 weakness of L Iliopsoas, 4+/5 adduction, 4/5 abduction 4+/5 knee extension, 4/5 knee flexion, 0/5 dorsiflexion, eversion, inversion with foot weakness being chronic. Reflexes are absent at ankles, symmetric and present at knees. Toes is mute on L, equivocal on R. Decreased sensation to pin on L leg (no appreciable pattern) with possibly reduced proprioception on R LE.  He does endorse left groin pain with proximal thigh movement. The patient is a 76-year-old gentleman with multiple comorbidities including spinocerebellar ataxia admitted 3/2 for elevated aortic valvuloplasty and PCI performed on 3/4.  Course complicated by new onset left lower extremity weakness complicated by left KURTIS pseudoaneurysm s/p angiogram and angioplasty and left groin hematoma.  MRI brain revealed multiple, small embolic appearing strokes in the posterior circulation but none that would explain his symptoms.  MRI thoracic and lumbar spine was unrevealing. CT A/P showed medial thigh hematoma which would was not thought to explain symptoms. He has had no back/leg pain apart from left groin pain.    The patient's exam is notable ataxic dysarthria (baseline). UE strenght is intact.  R LE strength is intact. L LE: 2/5 weakness of L Iliopsoas, 4+/5 adduction, 4/5 abduction 4+/5 knee extension, 4/5 knee flexion, 0/5 dorsiflexion, eversion, inversion with foot weakness being chronic. Reflexes are absent at ankles, symmetric and present at knees. Toes is mute on L, equivocal on R. Decreased sensation to pin on L leg (no appreciable pattern) with possibly reduced proprioception on R LE.  He does endorse left groin pain with proximal thigh movement.    Etiology is unclear. A classic femoral artery neuropathy related to the pseudoaneurysm/repair would result in quad weakness which is relatively spared in the patient. Occasionally, proximal (within pelvis) femoral (or branch) injury can result in relatively isolated hip flexion weakness and it is possible there was stretch injury to the proximal femoral nerve related to the KURTIS pseudoaneurysm. An UMN process is a possibility though the patient does not have typical clinical signs to suggest an UMN process. Appreciate neurosurgery input. The patient is a 76-year-old gentleman with multiple comorbidities including spinocerebellar ataxia admitted 3/2 for elevated aortic valvuloplasty and PCI performed on 3/4.  Course complicated by new onset left lower extremity weakness complicated by left KURTIS pseudoaneurysm s/p angiogram and angioplasty and left groin hematoma.  MRI brain revealed multiple, small embolic appearing strokes in the posterior circulation but none that would explain his symptoms.  MRI thoracic and lumbar spine was unrevealing. CT A/P showed medial thigh hematoma which would was not thought to explain symptoms. He has had no back/leg pain apart from left groin pain.    The patient's exam is notable ataxic dysarthria (baseline). UE strenght is intact.  R LE strength is intact. L LE: 2/5 weakness of L Iliopsoas, 4+/5 adduction, 4/5 abduction 4+/5 knee extension, 4/5 knee flexion, 0/5 dorsiflexion, eversion, inversion with foot weakness being chronic. Reflexes are absent at ankles, symmetric and present at knees. Toes is mute on L, equivocal on R. Decreased sensation to pin on L leg (no appreciable pattern) with possibly reduced proprioception on R LE.  He does endorse left groin pain with proximal thigh movement.    Etiology is unclear. A classic femoral artery neuropathy related to the pseudoaneurysm/repair would result in quad weakness which is relatively spared in the patient. Occasionally, proximal (within pelvis) femoral (or branch) injury can result in relatively isolated hip flexion weakness and it is possible there was stretch injury or even ischemia to the proximal femoral nerve related to the KURTIS pseudoaneurysm. An UMN process is a possibility though the patient does not have typical clinical signs to suggest an UMN process. Appreciate neurosurgery input. The patient is a 76-year-old gentleman with multiple comorbidities including spinocerebellar ataxia admitted 3/2 for elevated aortic valvuloplasty and PCI performed on 3/4.  Course complicated by new onset left lower extremity weakness complicated by left KURTIS pseudoaneurysm s/p angiogram and angioplasty and left groin hematoma.  MRI brain revealed multiple, small embolic appearing strokes in the posterior circulation but none that would explain his symptoms.  MRI thoracic and lumbar spine was unrevealing. CT A/P showed medial thigh hematoma which would was not thought to explain symptoms. He has had no back/leg pain apart from left groin pain.    The patient's exam is notable ataxic dysarthria (baseline). UE strenght is intact.  R LE strength is intact. L LE: 2/5 weakness of L Iliopsoas, 4+/5 adduction, 4/5 abduction 4+/5 knee extension, 4/5 knee flexion, 0/5 dorsiflexion, eversion, inversion with foot weakness being chronic. Reflexes are absent at ankles, symmetric and present at knees. Toes is mute on L, equivocal on R. Decreased sensation to pin on L leg (no appreciable pattern) with possibly reduced proprioception on R LE.  He does endorse left groin pain with proximal thigh movement.    Etiology is unclear. A classic femoral artery neuropathy related to the pseudoaneurysm/repair would result in quad weakness which is relatively spared in the patient. Occasionally, proximal (within pelvis) femoral (or branch) injury can result in relatively isolated hip flexion weakness and it is possible there was stretch injury or even ischemia to the proximal femoral nerve/lumbar plexus related to the KURTIS pseudoaneurysm. An UMN process is a possibility though the patient does not have typical clinical signs to suggest an UMN process. Appreciate neurosurgery input.

## 2022-03-07 NOTE — PROGRESS NOTE ADULT - ASSESSMENT
Assessment and Plan  76y Male with PMHx of HTN, HLD, NIDDM, "mild" MI in the past requiring a stent (diagonal branch), spinal ataxia (uses a walker at baseline), prostate CA s/p radiation, in remission x 8-9 years, LYNDA on CPAP at home with good compliance, chronic arthritis with neck and back pain presented to [facility] for feeling faint, found to have severe AS on CT angio, transferred to [facility] for cardiac cath on 3/2 which showed severe prox and mid-distal LAD, POD 1 for BAV/PCI. Stroke code called for acute left leg paralysis, NIHSS initially 4, decreased to 3 after CT scan. 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 Not tpa candidate due to recent procedure, symptoms mildly improved.   MRI brain done shows very tiny embolic strokes in b/l parietal lobes, right occipital, and b/l cerebellum, however the size and location of the strokes seen on MRI do not explain patient's left leg weakness.     1)Secondary stroke prevention  - continue ASA 81mg PO daily and Plavix 75mg PO daily, for secondary stroke prevention  - continue Atorvastatin 80mg PO daily    2) Stroke risk factors  - A1C: 6.4  - LDL: 28  - HTN, HLD    3) Further management  - may maintain MAP of 90 to maintain cerebral perfusion  - recommend q1hr stroke neuro checks  - provide stroke education    DVT prophylaxis   -Heparin SQ and SCDs    Discussed with Neurology Attending Dr. Shalini Isaac

## 2022-03-07 NOTE — PROGRESS NOTE ADULT - ASSESSMENT
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 On POD#1 patient complaining of being unable to move left leg, no pain and sensation intact throughout. CTAP performed 3/5 demonstrated Left medial thigh hematoma and no collection over groin puncture site. MRI brain done 3/5 showed Multifocal punctate areas of acute ischemia involving the cerebellum bilaterally (right greater than left) as well as the right occipital and parietal lobes bilaterally. MRI showed chronic mild compression fracture of L1 and moderate to severe compression fracture of L2. Plan pending d/w attending.

## 2022-03-07 NOTE — PROGRESS NOTE ADULT - SUBJECTIVE AND OBJECTIVE BOX
INTERVAL HPI/OVERNIGHT EVENTS:  Patient seen and examined. Laying asleep in chair on CPAP. He states he has no complaints of pain but that he feels his left leg is numb.     MEDICATIONS  (STANDING):  amphetamine/dextroamphetamine XR 20 milliGRAM(s) Oral with breakfast  aspirin  chewable 81 milliGRAM(s) Oral daily  atorvastatin 80 milliGRAM(s) Oral at bedtime  clopidogrel Tablet 75 milliGRAM(s) Oral daily  dextrose 40% Gel 15 Gram(s) Oral once  dextrose 5%. 1000 milliLiter(s) (50 mL/Hr) IV Continuous <Continuous>  dextrose 5%. 1000 milliLiter(s) (100 mL/Hr) IV Continuous <Continuous>  dextrose 50% Injectable 25 Gram(s) IV Push once  dextrose 50% Injectable 12.5 Gram(s) IV Push once  dextrose 50% Injectable 25 Gram(s) IV Push once  DULoxetine 20 milliGRAM(s) Oral daily  escitalopram 10 milliGRAM(s) Oral daily  glucagon  Injectable 1 milliGRAM(s) IntraMuscular once  heparin   Injectable 5000 Unit(s) SubCutaneous every 8 hours  insulin lispro (ADMELOG) corrective regimen sliding scale   SubCutaneous Before meals and at bedtime  melatonin 5 milliGRAM(s) Oral at bedtime  mirtazapine 7.5 milliGRAM(s) Oral at bedtime  pantoprazole    Tablet 40 milliGRAM(s) Oral before breakfast  polyethylene glycol 3350 17 Gram(s) Oral daily  sodium chloride 0.9%. 1000 milliLiter(s) (10 mL/Hr) IV Continuous <Continuous>  sodium chloride 0.9%. 1000 milliLiter(s) (50 mL/Hr) IV Continuous <Continuous>  tamsulosin 0.4 milliGRAM(s) Oral at bedtime    MEDICATIONS  (PRN):  acetaminophen     Tablet .. 650 milliGRAM(s) Oral every 6 hours PRN Mild Pain (1 - 3)      Allergies    No Known Allergies    Intolerances        Vital Signs Last 24 Hrs  T(C): 36.6 (07 Mar 2022 14:28), Max: 37.1 (07 Mar 2022 01:01)  T(F): 97.8 (07 Mar 2022 14:28), Max: 98.7 (07 Mar 2022 01:01)  HR: 68 (07 Mar 2022 16:00) (68 - 100)  BP: 137/65 (07 Mar 2022 16:00) (89/50 - 139/75)  BP(mean): 94 (07 Mar 2022 16:00) (65 - 101)  RR: 18 (07 Mar 2022 16:00) (15 - 18)  SpO2: 99% (07 Mar 2022 16:00) (91% - 100%)    Physical exam:  General: No acute distress, awake and alert. Right periorbital ecchymosis present.   Eyes: Anicteric sclerae, moist conjunctivae, see below for CNs  Neck: trachea midline  Cardiovascular: Regular rate and rhythm, no murmurs, rubs, or gallops  Pulmonary: Anterior breath sounds clear bilaterally, no crackles or wheezing. No use of accessory muscles  GI: Abdomen soft, non-distended, non-tender  Extremities: no edema    Neurologic:  -Mental status: Awake, alert, oriented to person, place, and time. Speech is fluent with intact naming, repetition, and comprehension. Speech is dysarthric. Recent and remote memory intact. Follows commands. Attention/concentration intact. Fund of knowledge appropriate.  -Cranial nerves:   II: Visual fields are full to confrontation.  III, IV, VI: Extraocular movements are intact, nystagmus present right and left directions, as well as looking upward. Pupils equally round and reactive to light  V:  Facial sensation V1-V3 equal and intact   VII: Face is symmetric with normal eye closure and smile  XII: Tongue protrudes midline  Motor: Normal bulk and tone. No pronator drift. Strength bilateral upper extremity 5/5, right lower extremity 5/5. Left lower extremity 1+/5, weak plantar flexion and dorsiflexion. Left foot drop.   Sensation: Intact to light touch bilaterally. No neglect or extinction on double simultaneous testing.  Coordination: Bilateral upper extremity dysmetria present during FTN.     LABS:                        8.3    4.34  )-----------( 131      ( 07 Mar 2022 00:35 )             24.4     03-07    142  |  108  |  21  ----------------------------<  164<H>  4.2   |  26  |  1.05    Ca    8.2<L>      07 Mar 2022 00:35  Phos  2.2     03-07  Mg     1.9     03-07    TPro  5.3<L>  /  Alb  3.5  /  TBili  0.6  /  DBili  x   /  AST  32  /  ALT  36  /  AlkPhos  45  03-07    PT/INR - ( 07 Mar 2022 00:35 )   PT: 13.6 sec;   INR: 1.14          PTT - ( 07 Mar 2022 00:35 )  PTT:30.9 sec      RADIOLOGY & ADDITIONAL TESTS:  MRI T Spine: No evidence of spinal cord infarction. No evidence of   abnormal enhancement.    MRI Lumbar: Mild chronic compression fracture of the L1 vertebral body. Moderate to   severe chronic compression fracture of the L2 vertebral body.    Multilevel degenerative changes most notable at L1/L2 and L2/L3 with   moderate spinal canal stenosis.

## 2022-03-07 NOTE — OCCUPATIONAL THERAPY INITIAL EVALUATION ADULT - PLANNED THERAPY INTERVENTIONS, OT EVAL
ADL retraining/IADL retraining/balance training/bed mobility training/cognitive, visual perceptual/neuromuscular re-education/strengthening/transfer training

## 2022-03-07 NOTE — DIETITIAN INITIAL EVALUATION ADULT. - ADD RECOMMEND
1. Continue with current diet order (monitor need for ONS); if poor PO intake, consider liberalizing to Low Sodium 2. Encourage pt to meet nutritional needs as able 3. Encourage adherence to diet education (reinforce as able) 4. Pain and bowel regimen per team 5. Will continue to assess/honor preferences as able

## 2022-03-07 NOTE — OCCUPATIONAL THERAPY INITIAL EVALUATION ADULT - MD ORDER
POD1, patient developed new onset LLE paralysis. Stroke code was called, CTH/CTA negative. MRI brain revealed b/l cerebellar, b/l parietal, and right occipital infarcts. MRI lumbar spine revealed chronic L1/L2 fractures. Patient reports chronic low back pain since 2020 and lumbar compression fractures due to mechanical falls last year. Patient has been working with PT and had two lumbar nerve blocks in 2021. Patient able to walk with walker at baseline, currently unable to bear weight on LLE.

## 2022-03-07 NOTE — DIETITIAN INITIAL EVALUATION ADULT. - PERTINENT MEDS FT
Insulin Admelog Sliding Scale  Protonix Insulin Admelog Sliding Scale  Protonix  Lipitor   Cymbalta  Lexapro  Remeron  Miralax

## 2022-03-07 NOTE — DIETITIAN INITIAL EVALUATION ADULT. - OTHER CALCULATIONS
Based on Standards of Care pt within % IBW thus actual body weight used for all calculations. Needs adjusted for advanced age and post op healing.

## 2022-03-07 NOTE — PROGRESS NOTE ADULT - SUBJECTIVE AND OBJECTIVE BOX
CTICU  CRITICAL  CARE  attending     Hand off received 					   Pertinent clinical, laboratory, radiographic, hemodynamic, echocardiographic, respiratory data, microbiologic data and chart were reviewed and analyzed frequently throughout the course of the day and night  Patient seen and examined with CTS/ SH attending at bedside  Pt is a 76y , Male, HEALTH ISSUES - PROBLEM Dx:      , FAMILY HISTORY:  PAST MEDICAL & SURGICAL HISTORY:  History of aortic stenosis    HTN (hypertension)    Hyperlipidemia    Diabetes mellitus    LYNDA on CPAP    Arthritis    CAD (coronary artery disease)    Ataxia, family, spinal      Patient is a 76y old  Male who presents with a chief complaint of Aortic stenosis, CAD (07 Mar 2022 15:37)      14 system review limited by mentation and multiorgan morbidity     Vital signs, hemodynamic and respiratory parameters were reviewed from the bedside nursing flowsheet.  ICU Vital Signs Last 24 Hrs  T(C): 36.6 (07 Mar 2022 14:28), Max: 37.1 (07 Mar 2022 01:01)  T(F): 97.8 (07 Mar 2022 14:28), Max: 98.7 (07 Mar 2022 01:01)  HR: 68 (07 Mar 2022 16:00) (68 - 100)  BP: 137/65 (07 Mar 2022 16:00) (89/50 - 139/75)  BP(mean): 94 (07 Mar 2022 16:00) (65 - 101)  ABP: --  ABP(mean): --  RR: 18 (07 Mar 2022 16:00) (15 - 18)  SpO2: 99% (07 Mar 2022 16:00) (91% - 100%)    Adult Advanced Hemodynamics Last 24 Hrs  CVP(mm Hg): --  CVP(cm H2O): --  CO: --  CI: --  PA: --  PA(mean): --  PCWP: --  SVR: --  SVRI: --  PVR: --  PVRI: --, ABG - ( 06 Mar 2022 09:38 )  pH, Arterial: 7.43  pH, Blood: x     /  pCO2: 42    /  pO2: 75    / HCO3: 28    / Base Excess: 3.2   /  SaO2: 96.0                Intake and output was reviewed and the fluid balance was calculated  Daily     Daily   I&O's Summary    06 Mar 2022 07:01  -  07 Mar 2022 07:00  --------------------------------------------------------  IN: 1325 mL / OUT: 1960 mL / NET: -635 mL    07 Mar 2022 07:01  -  07 Mar 2022 16:35  --------------------------------------------------------  IN: 537.5 mL / OUT: 665 mL / NET: -127.5 mL        All lines and drain sites were assessed  [facility]      POCT Blood Glucose.: 199 mg/dL (07 Mar 2022 12:53)    No acute change in focality  Auscultation of the chest reveals equal bs  Abdomen is soft  Extremities are warm and well perfused  Wounds appear clean and unremarkable  Antibiotics are periop    labs  CBC Full  -  ( 07 Mar 2022 00:35 )  WBC Count : 4.34 K/uL  RBC Count : 2.63 M/uL  Hemoglobin : 8.3 g/dL  Hematocrit : 24.4 %  Platelet Count - Automated : 131 K/uL  Mean Cell Volume : 92.8 fl  Mean Cell Hemoglobin : 31.6 pg  Mean Cell Hemoglobin Concentration : 34.0 gm/dL  Auto Neutrophil # : x  Auto Lymphocyte # : x  Auto Monocyte # : x  Auto Eosinophil # : x  Auto Basophil # : x  Auto Neutrophil % : x  Auto Lymphocyte % : x  Auto Monocyte % : x  Auto Eosinophil % : x  Auto Basophil % : x    03-07    142  |  108  |  21  ----------------------------<  164<H>  4.2   |  26  |  1.05    Ca    8.2<L>      07 Mar 2022 00:35  Phos  2.2     03-07  Mg     1.9     03-07    TPro  5.3<L>  /  Alb  3.5  /  TBili  0.6  /  DBili  x   /  AST  32  /  ALT  36  /  AlkPhos  45  03-07    PT/INR - ( 07 Mar 2022 00:35 )   PT: 13.6 sec;   INR: 1.14          PTT - ( 07 Mar 2022 00:35 )  PTT:30.9 sec  The current medications were reviewed   MEDICATIONS  (STANDING):  amphetamine/dextroamphetamine XR 20 milliGRAM(s) Oral with breakfast  aspirin  chewable 81 milliGRAM(s) Oral daily  atorvastatin 80 milliGRAM(s) Oral at bedtime  clopidogrel Tablet 75 milliGRAM(s) Oral daily  dextrose 40% Gel 15 Gram(s) Oral once  dextrose 5%. 1000 milliLiter(s) (50 mL/Hr) IV Continuous <Continuous>  dextrose 5%. 1000 milliLiter(s) (100 mL/Hr) IV Continuous <Continuous>  dextrose 50% Injectable 25 Gram(s) IV Push once  dextrose 50% Injectable 12.5 Gram(s) IV Push once  dextrose 50% Injectable 25 Gram(s) IV Push once  DULoxetine 20 milliGRAM(s) Oral daily  escitalopram 10 milliGRAM(s) Oral daily  glucagon  Injectable 1 milliGRAM(s) IntraMuscular once  heparin   Injectable 5000 Unit(s) SubCutaneous every 8 hours  insulin lispro (ADMELOG) corrective regimen sliding scale   SubCutaneous Before meals and at bedtime  melatonin 5 milliGRAM(s) Oral at bedtime  mirtazapine 7.5 milliGRAM(s) Oral at bedtime  pantoprazole    Tablet 40 milliGRAM(s) Oral before breakfast  polyethylene glycol 3350 17 Gram(s) Oral daily  sodium chloride 0.9%. 1000 milliLiter(s) (10 mL/Hr) IV Continuous <Continuous>  sodium chloride 0.9%. 1000 milliLiter(s) (50 mL/Hr) IV Continuous <Continuous>  tamsulosin 0.4 milliGRAM(s) Oral at bedtime    MEDICATIONS  (PRN):  acetaminophen     Tablet .. 650 milliGRAM(s) Oral every 6 hours PRN Mild Pain (1 - 3)       PROBLEM LIST/ ASSESSMENT:  HEALTH ISSUES - PROBLEM Dx:      ,   Patient is a 76y old  Male who presents with a chief complaint of Aortic stenosis, CAD (07 Mar 2022 15:37)     s/p cardiac surgery pci x 2, bav balloon cfa    lle weakness localized, neuro sx on case, du recs  on asa plavix  l1l2 stenosis, keep i/o even     Acute hypoxic resp failure on supplemental oxygen   Neuro monitoring closely for focal deficits   Cardiovascular support, map > 80 target with vasopressor drips as needed  Renal function closely monitored for UO and goal net negative status   Early mobility, diet and ambulation and encourage incentive charli   ID eval with wbc fevers and change lines over 5 days old, dc chun as early as possible , abx as needed  Hem, hh monitoring and golas over 7/22  GI bm eval and monitoring  DVT SUP prophymalis per protocol      My plan includes :  close hemodynamic, ventilatory and drain monitoring and management per post op routine    Monitor for arrhythmias and monitor parameters for organ perfusion  beta blockade not administered due to hemodynamic instability and bradycardia  monitor neurologic status  Head of the bed should remain elevated to 45 deg .   chest PT and IS will be encouraged  monitor adequacy of oxygenation and ventilation and attempt to wean oxygen  antibiotic regimen will be tailored to the clinical, laboratory and microbiologic data  Nutritional goals will be met using po eventually , ensure adequate caloric intake and montior the same  Stress ulcer and VTE prophylaxis will be achieved    Glycemic control is satisfactory  Electrolytes have been repleted as necessary and wound care has been carried out. Pain control has been achieved.   agressive physical therapy and early mobility and ambulation goals will be met   The family was updated about the course and plan  CRITICAL CARE TIME personally provided by me  in evaluation and management, reassessments, review and interpretation of labs and x-rays, ventilator and hemodynamic management, formulating a plan and coordinating care: ___60____ MIN.  Time does not include procedural time. Time spent was non routine post-operarive caRE and included multiple and repeated evaluations at the bedside  CTICU ATTENDING     					    Damon Mcfadden MD

## 2022-03-07 NOTE — DIETITIAN INITIAL EVALUATION ADULT. - OTHER INFO
75yo M w/ pmhx: HTN, HLD, NIDDM. 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 On POD#1 patient complaining of being unable to move left leg, no pain and sensation intact throughout. CTAP performed 3/5 demonstrated Left medial thigh hematoma and no collection over groin puncture site. MRI brain done 3/5 showed Multifocal punctate areas of acute ischemia involving the cerebellum bilaterally (right greater than left) as well as the right occipital and parietal lobes bilaterally. Pending MRI of T and L spine.    Pt seen at bedside for initial assessment. Denies nausea/vomiting at this time. No bowel movement since admission. Confirms NKFA. Denies change in appetite PTA; endorses 2-3 meals/day at home (reports following no therapeutic diet at home). Reports usual body weight 208 pounds (relatively consistent with dosing weight 209 pounds). Per EMR, pt 216 pounds (6/30/21); No edema documented at this time. No pressure ulcers documented at this time. incision per chart. Ivan score=. Labs reviewed ; . Observed pt with no overt signs of muscle or fat wasting. Based on ASPEN guidelines, pt does not meet criteria for malnutrition at this time. Provided pt with diet education regarding importance of meeting nutritional needs post operatively ; amenable to education. Discussed current diet order DASH/TLC; provided diet education in depth, discussed sources of high versus low fat, types of fluids, and low sodium foods; provided handouts at bedside per pt request.  Pt reports feeling hungry during hospital stay, able to complete % of meals. No cultural, ethnic, Synagogue food preferences noted. Made aware RD remains available. RD to follow up per protocol. See nutrition recommendations below. 77yo M w/ pmhx: HTN, HLD, NIDDM. 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 On POD#1 patient complaining of being unable to move left leg, no pain and sensation intact throughout. CTAP performed 3/5 demonstrated Left medial thigh hematoma and no collection over groin puncture site. MRI brain done 3/5 showed Multifocal punctate areas of acute ischemia involving the cerebellum bilaterally (right greater than left) as well as the right occipital and parietal lobes bilaterally. Pending MRI of T and L spine.    Pt seen at bedside for initial assessment. Denies nausea/vomiting at this time. No bowel movement since admission. Confirms NKFA. Denies change in appetite PTA; endorses 2-3 meals/day at home (reports following no therapeutic diet at home). Reports usual body weight 208 pounds (relatively consistent with dosing weight 209 pounds). Per EMR, pt 216 pounds (6/30/21); confirmed w/ pt (8 pound/3.7% weight loss x 9 months, not clinically significant). Generalized edema 1+. No pressure ulcers documented at this time. right wrist/right groin incision per chart. Ivan score=16. Labs reviewed 3/7; hypophosphatemic. Fingersticks 3/6-3/7: 140-187 mg/dL; ISS ordered. Observed pt with no overt signs of muscle or fat wasting. Based on ASPEN guidelines, pt does not meet criteria for malnutrition at this time. Provided pt with diet education regarding importance of meeting nutritional needs post operatively ; amenable to education. Discussed current diet order DASH/TLC; provided diet education in depth, discussed low sodium foods. Pt reports feeling hungry during hospital stay, able to complete >75% of meals. No cultural, ethnic, Hoah[employer]ism food preferences noted. Made aware RD remains available. RD to follow up per protocol. See nutrition recommendations below.

## 2022-03-07 NOTE — DIETITIAN INITIAL EVALUATION ADULT. - PERTINENT LABORATORY DATA
Sodium, Serum: 142 mmol/L  Potassium, Serum: 4.2 mmol/L  Chloride, Serum: 108 mmol/L  BUN, Serum: 21 mg/dL  Creatinine, Serum: 1.05 mg/dL  Glucose, Serum: 164 mg/dL (Elevated)  Calcium, Serum: 8.2 mg/dL (Low)  Magnesium, Serum: 1.9 mg/dL  Phosphorus, Serum: 2.2 mg/dL (Low)    Last HbA1c 6.4% (3/3); noted h/o diabetes  Fingersticks 3/6-3/7  3/6: 155 mg/dL  3/6: 187 mg/dL  3/6: 154 mg/dL  3/6: 140 mg/dL  3/7: 153 mg/dL    Lipid Profile (3/5)  Cholesterol, Serum: 79 mg/dL  Triglycerides, Serum: 128 mg/dL  HDL Cholesterol, Serum: 25 mg/dL (Low)   LDL Cholesterol Calculated: 28 mg/dL

## 2022-03-07 NOTE — CONSULT NOTE ADULT - SUBJECTIVE AND OBJECTIVE BOX
NEUROSURGERY CONSULT NOTE     HISTORY OF PRESENT ILLNESS:   75 y/o male with PMHx of HTN, HLD, DM, prostate CA s/p radiation (in remission x 8-9 years), LYNDA on CPAP, chronic arthritis, severe aortic stenosis s/p balloon aortic valvuloplasty and PCI 3/4/22. POD1, patient developed new onset LLE paralysis. Stroke code was called, CTH/CTA negative. MRI brain revealed b/l cerebellar, b/l parietal, and right occipital infarcts. MRI lumbar spine revealed chronic L1/L2 fractures. Patient reports chronic low back pain since 2020 and lumbar compression fractures due to mechanical falls last year. Patient has been working with PT and had two lumbar nerve blocks in 2021. Patient able to walk with walker at baseline, currently unable to bear weight on LLE. Denies any headaches, dizziness, blurry vision, diplopia, chest pain, n/v/d, numbness, paresthesias, bowel/bladder dysfunction.     PAST MEDICAL & SURGICAL HISTORY:  History of aortic stenosis    HTN (hypertension)    Hyperlipidemia    Diabetes mellitus    LYNDA on CPAP    Arthritis    CAD (coronary artery disease)    Ataxia, family, spinal      Allergies    No Known Allergies    Intolerances        REVIEW OF SYSTEMS  CONSTITUTIONAL:  No night sweats, fatigue, fever or chills.  HEENT:  Eyes:  + R periorbital ecchymosis, No visual changes, eye pain, eye discharge.    ENT:    No sinus pain, sore throat, odynophagia, ear pain, congestion.  RESPIRATORY: No wheezing, SOB, cough.   CARDIOVASCULAR:  No chest pains, palpitations.  GASTROINTESTINAL:  No abdominal pain, n/v/d, constipation.    GENITOURINARY:  No urgency, frequency, dysuria, hematuria.    MUSCULOSKELETAL:  +tenderness along L1-L2, +LLE weakness, no joint swelling.    SKIN:  No rashes, lesions, wounds.  HEMATOLOGIC:  No purpura, petechiae.     ALLERGIC AND IMMUNOLOGIC:  No pruritus, no pitting edema.      MEDICATIONS:  Antibiotics:    Neuro:  acetaminophen     Tablet .. 650 milliGRAM(s) Oral every 6 hours PRN  amphetamine/dextroamphetamine XR 20 milliGRAM(s) Oral with breakfast  DULoxetine 20 milliGRAM(s) Oral daily  escitalopram 10 milliGRAM(s) Oral daily  melatonin 5 milliGRAM(s) Oral at bedtime  mirtazapine 7.5 milliGRAM(s) Oral at bedtime    Anticoagulation:  aspirin  chewable 81 milliGRAM(s) Oral daily  clopidogrel Tablet 75 milliGRAM(s) Oral daily  heparin   Injectable 5000 Unit(s) SubCutaneous every 8 hours    OTHER:  atorvastatin 80 milliGRAM(s) Oral at bedtime  dextrose 40% Gel 15 Gram(s) Oral once  dextrose 50% Injectable 25 Gram(s) IV Push once  dextrose 50% Injectable 12.5 Gram(s) IV Push once  dextrose 50% Injectable 25 Gram(s) IV Push once  glucagon  Injectable 1 milliGRAM(s) IntraMuscular once  insulin lispro (ADMELOG) corrective regimen sliding scale   SubCutaneous three times a day before meals  pantoprazole    Tablet 40 milliGRAM(s) Oral before breakfast  polyethylene glycol 3350 17 Gram(s) Oral daily  tamsulosin 0.4 milliGRAM(s) Oral at bedtime    IVF:  dextrose 5%. 1000 milliLiter(s) IV Continuous <Continuous>  dextrose 5%. 1000 milliLiter(s) IV Continuous <Continuous>  sodium chloride 0.9%. 1000 milliLiter(s) IV Continuous <Continuous>  sodium chloride 0.9%. 1000 milliLiter(s) IV Continuous <Continuous>      Vital Signs Last 24 Hrs  T(C): 36.2 (07 Mar 2022 09:49), Max: 37.1 (07 Mar 2022 01:01)  T(F): 97.2 (07 Mar 2022 09:49), Max: 98.7 (07 Mar 2022 01:01)  HR: 71 (07 Mar 2022 12:00) (68 - 100)  BP: 124/74 (07 Mar 2022 12:00) (89/50 - 135/89)  BP(mean): 97 (07 Mar 2022 12:00) (65 - 98)  RR: 18 (07 Mar 2022 12:00) (15 - 18)  SpO2: 96% (07 Mar 2022 12:00) (91% - 100%)    PHYSICAL EXAM:  Constitutional:  75 y/o male awake, alert in no acute distress.  HEENT: PERRL 3mm b/l Sclera anicteric, conjunctiva noninjected. Oropharyngeal mucosa moist, pink. Tongue midline. Neck supple, FROM.    Back:  mild tenderness to palpation along L1-2  Respiratory: nonlabored breathing, normal chest rise  Cardiovascular: Regular rate and rhythm.   Gastrointestinal:  abdomen soft, nontender nondistended  Vascular: Extremities warm, no ulcers. pulses 2+ b/l LE   Neurological: Gen: AA&Ox3, mild dysarthria    CN II-XII grossly intact.    Motor: ALVAREZ x 4, 5/5 b/l UE, RLE 5/5, LLE HF 1/5, KF/PF/EHL 2/5    Sens: Sensation intact to light touch throughout.    DTRs: 2+ symmetric throughout.    Hoffmans negative bilaterally. No clonus.      No pronator drift, no dysmetria.  Skin: R periorbital ecchymosis Warm, dry, no erythema.    LABS:                        8.3    4.34  )-----------( 131      ( 07 Mar 2022 00:35 )             24.4     03-07    142  |  108  |  21  ----------------------------<  164<H>  4.2   |  26  |  1.05    Ca    8.2<L>      07 Mar 2022 00:35  Phos  2.2     03-07  Mg     1.9     03-07    TPro  5.3<L>  /  Alb  3.5  /  TBili  0.6  /  DBili  x   /  AST  32  /  ALT  36  /  AlkPhos  45  03-07    PT/INR - ( 07 Mar 2022 00:35 )   PT: 13.6 sec;   INR: 1.14          PTT - ( 07 Mar 2022 00:35 )  PTT:30.9 sec    CULTURES:      RADIOLOGY & ADDITIONAL STUDIES:  < from: MR Lumbar Spine w/ IV Cont (03.06.22 @ 15:44) >  Impression:  Mild chronic compression fracture of the L1 vertebral body. Moderate to severe chronic compression fracture of the L2 vertebral body.  Multilevel degenerative changes most notable at L1/L2 and L2/L3 with moderate spinal canal stenosis.    < from: MR Thoracic Spine w/wo IV Cont (03.06.22 @ 15:43) >  Impression: No evidence of spinal cord infarction. No evidence of abnormal enhancement.    < from: MR Head No Cont (03.05.22 @ 12:10) >  IMPRESSION: Multifocal punctate areas of acute ischemia involving the cerebellum bilaterally (right greater than left) as well as the right occipital and parietal lobes bilaterally.    < from: CT Angio Abdomen and Pelvis w/ IV Cont (03.05.22 @ 10:56) >  IMPRESSION:  1.  Status post TAVR with left groin access. Small filling defect within the proximal left profunda likely representing an embolic clot. Small subcutaneous hematoma. No pseudoaneurysm.  2.  Left medial thigh hematoma measuring up to 5 cm in maximum dimension.    < from: CT Head No Cont (03.05.22 @ 07:05) >  IMPRESSION: No acute intracranial hemorrhage, mass effect, or CT evidence   of recent transcortical infarction.    < from: CT Angio Neck w/ IV Cont (03.05.22 @ 07:05) >  IMPRESSION:  1.  No large vessel occlusion or high-grade stenosis.  2.  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    < from: CT Angio Neck w/ IV Cont (03.05.22 @ 07:05) >  IMPRESSION: No significant stenosis, occlusion, or dissection of the cervical carotid or vertebral arteries.      Assessment:  75 y/o male with PMHx of HTN, HLD, DM, prostate CA s/p radiation (in remission x 8-9 years), LYNDA on CPAP, chronic arthritis, severe AS s/p balloon aortic valvuloplasty and PCI 3/4/22. POD1, patient developed new onset LLE paralysis. Stroke code was called, CTH/CTA negative. MRI brain revealed b/l cerebellar, b/l parietal, and right occipital infarcts. Neurosurgery was consulted for evaluation of thoracic and lumbar spine imagine in evaluation of LLE weakness. MRI lumbar spine revealed chronic L1/L2 fractures.       Recommendations   - New onset symptoms can be attributed to spinal cord infarct, however difficult to assess on MRI  - No active neurosurgical interventions at this time for known chronic lumbar compression fracture and degenerative disc disease  - continue with care as per primary     Assessment and plan d/w Dr. Rowell

## 2022-03-07 NOTE — CONSULT NOTE ADULT - ATTENDING COMMENTS
Patient seen and examined by me 3/7/2022. s/p cardiac intervention, noted to have left leg weakness. he has a baseline left foot drop per patient report. MRI brain showing multifocal embolic infarcts predominantly cerebellar location but possible some supratentorial as well. Etiology of leg weakness not obvious but could be related to cord infarction which is typically not well-visualized on any imaging. Spine imaging showing chronic spondylosis which would not account for his acute left leg weakness. No acutely compressive pathology noted in the spine. No need for neurosurgical intervention at this time. Will defer management of embolic strokes to the Stroke/neurology team.    Moris Rowell M.D.

## 2022-03-07 NOTE — PROGRESS NOTE ADULT - SUBJECTIVE AND OBJECTIVE BOX
24 hr events  No major events    Vital Signs Last 24 Hrs  T(C): 36.6 (07 Mar 2022 14:28), Max: 37.1 (07 Mar 2022 01:01)  T(F): 97.8 (07 Mar 2022 14:28), Max: 98.7 (07 Mar 2022 01:01)  HR: 76 (07 Mar 2022 15:00) (68 - 100)  BP: 120/69 (07 Mar 2022 15:00) (89/50 - 139/75)  BP(mean): 92 (07 Mar 2022 15:00) (65 - 101)  RR: 18 (07 Mar 2022 15:00) (15 - 18)  SpO2: 94% (07 Mar 2022 15:00) (91% - 100%)    I&O's Summary    06 Mar 2022 07:01  -  07 Mar 2022 07:00  --------------------------------------------------------  IN: 1325 mL / OUT: 1960 mL / NET: -635 mL    07 Mar 2022 07:01  -  07 Mar 2022 15:38  --------------------------------------------------------  IN: 537.5 mL / OUT: 540 mL / NET: -2.5 mL        Physical Exam:  General: NAD  Pulmonary: Nonlabored breathing, no respiratory distress  Cardiovascular: NSR  Abdominal: soft, NT/ND, no organomegaly  Groin; soft, dressing clean dry and intact, no swelling or ecchymosis.  Extremities: WWP, SCDs in place, Palp DP/PT pulses bilaterally.  Neuro/MSK: Slightly slurred speech that seems to be patient's baseline.  LLE hip flexors 3/5, ankle dorsiflexion/plantar flexion 3/5, EHL/FHL 4/5. Sensation intact throughout LLE.     Lines/drains/tubes:    LABS:                        8.3    4.34  )-----------( 131      ( 07 Mar 2022 00:35 )             24.4     03-07    142  |  108  |  21  ----------------------------<  164<H>  4.2   |  26  |  1.05    Ca    8.2<L>      07 Mar 2022 00:35  Phos  2.2     03-07  Mg     1.9     03-07    TPro  5.3<L>  /  Alb  3.5  /  TBili  0.6  /  DBili  x   /  AST  32  /  ALT  36  /  AlkPhos  45  03-07    PT/INR - ( 07 Mar 2022 00:35 )   PT: 13.6 sec;   INR: 1.14          PTT - ( 07 Mar 2022 00:35 )  PTT:30.9 sec    LIVER FUNCTIONS - ( 07 Mar 2022 00:35 )  Alb: 3.5 g/dL / Pro: 5.3 g/dL / ALK PHOS: 45 U/L / ALT: 36 U/L / AST: 32 U/L / GGT: x           CAPILLARY BLOOD GLUCOSE      POCT Blood Glucose.: 199 mg/dL (07 Mar 2022 12:53)  POCT Blood Glucose.: 153 mg/dL (07 Mar 2022 06:54)  POCT Blood Glucose.: 140 mg/dL (06 Mar 2022 16:59)      RADIOLOGY & ADDITIONAL TESTS:

## 2022-03-08 LAB
ALBUMIN SERPL ELPH-MCNC: 3.7 G/DL — SIGNIFICANT CHANGE UP (ref 3.3–5)
ALP SERPL-CCNC: 52 U/L — SIGNIFICANT CHANGE UP (ref 40–120)
ALT FLD-CCNC: 32 U/L — SIGNIFICANT CHANGE UP (ref 10–45)
ANION GAP SERPL CALC-SCNC: 10 MMOL/L — SIGNIFICANT CHANGE UP (ref 5–17)
ANION GAP SERPL CALC-SCNC: 8 MMOL/L — SIGNIFICANT CHANGE UP (ref 5–17)
APTT BLD: 32 SEC — SIGNIFICANT CHANGE UP (ref 27.5–35.5)
APTT BLD: 32.7 SEC — SIGNIFICANT CHANGE UP (ref 27.5–35.5)
AST SERPL-CCNC: 28 U/L — SIGNIFICANT CHANGE UP (ref 10–40)
BILIRUB SERPL-MCNC: 0.7 MG/DL — SIGNIFICANT CHANGE UP (ref 0.2–1.2)
BUN SERPL-MCNC: 22 MG/DL — SIGNIFICANT CHANGE UP (ref 7–23)
BUN SERPL-MCNC: 23 MG/DL — SIGNIFICANT CHANGE UP (ref 7–23)
CALCIUM SERPL-MCNC: 8.8 MG/DL — SIGNIFICANT CHANGE UP (ref 8.4–10.5)
CALCIUM SERPL-MCNC: 9.6 MG/DL — SIGNIFICANT CHANGE UP (ref 8.4–10.5)
CHLORIDE SERPL-SCNC: 103 MMOL/L — SIGNIFICANT CHANGE UP (ref 96–108)
CHLORIDE SERPL-SCNC: 106 MMOL/L — SIGNIFICANT CHANGE UP (ref 96–108)
CO2 SERPL-SCNC: 24 MMOL/L — SIGNIFICANT CHANGE UP (ref 22–31)
CO2 SERPL-SCNC: 25 MMOL/L — SIGNIFICANT CHANGE UP (ref 22–31)
CREAT SERPL-MCNC: 0.86 MG/DL — SIGNIFICANT CHANGE UP (ref 0.5–1.3)
CREAT SERPL-MCNC: 0.87 MG/DL — SIGNIFICANT CHANGE UP (ref 0.5–1.3)
EGFR: 89 ML/MIN/1.73M2 — SIGNIFICANT CHANGE UP
EGFR: 89 ML/MIN/1.73M2 — SIGNIFICANT CHANGE UP
GLUCOSE BLDC GLUCOMTR-MCNC: 159 MG/DL — HIGH (ref 70–99)
GLUCOSE BLDC GLUCOMTR-MCNC: 222 MG/DL — HIGH (ref 70–99)
GLUCOSE BLDC GLUCOMTR-MCNC: 274 MG/DL — HIGH (ref 70–99)
GLUCOSE BLDC GLUCOMTR-MCNC: 280 MG/DL — HIGH (ref 70–99)
GLUCOSE SERPL-MCNC: 161 MG/DL — HIGH (ref 70–99)
GLUCOSE SERPL-MCNC: 176 MG/DL — HIGH (ref 70–99)
HCT VFR BLD CALC: 27.2 % — LOW (ref 39–50)
HCT VFR BLD CALC: 33.4 % — LOW (ref 39–50)
HGB BLD-MCNC: 11.3 G/DL — LOW (ref 13–17)
HGB BLD-MCNC: 9.2 G/DL — LOW (ref 13–17)
INR BLD: 1.06 — SIGNIFICANT CHANGE UP (ref 0.88–1.16)
INR BLD: 1.07 — SIGNIFICANT CHANGE UP (ref 0.88–1.16)
MAGNESIUM SERPL-MCNC: 2 MG/DL — SIGNIFICANT CHANGE UP (ref 1.6–2.6)
MAGNESIUM SERPL-MCNC: 2.1 MG/DL — SIGNIFICANT CHANGE UP (ref 1.6–2.6)
MCHC RBC-ENTMCNC: 30.7 PG — SIGNIFICANT CHANGE UP (ref 27–34)
MCHC RBC-ENTMCNC: 31.2 PG — SIGNIFICANT CHANGE UP (ref 27–34)
MCHC RBC-ENTMCNC: 33.8 GM/DL — SIGNIFICANT CHANGE UP (ref 32–36)
MCHC RBC-ENTMCNC: 33.8 GM/DL — SIGNIFICANT CHANGE UP (ref 32–36)
MCV RBC AUTO: 90.7 FL — SIGNIFICANT CHANGE UP (ref 80–100)
MCV RBC AUTO: 92.3 FL — SIGNIFICANT CHANGE UP (ref 80–100)
NRBC # BLD: 0 /100 WBCS — SIGNIFICANT CHANGE UP (ref 0–0)
NRBC # BLD: 0 /100 WBCS — SIGNIFICANT CHANGE UP (ref 0–0)
PHOSPHATE SERPL-MCNC: 2.8 MG/DL — SIGNIFICANT CHANGE UP (ref 2.5–4.5)
PLATELET # BLD AUTO: 152 K/UL — SIGNIFICANT CHANGE UP (ref 150–400)
PLATELET # BLD AUTO: 196 K/UL — SIGNIFICANT CHANGE UP (ref 150–400)
POTASSIUM SERPL-MCNC: 4.3 MMOL/L — SIGNIFICANT CHANGE UP (ref 3.5–5.3)
POTASSIUM SERPL-MCNC: 4.7 MMOL/L — SIGNIFICANT CHANGE UP (ref 3.5–5.3)
POTASSIUM SERPL-SCNC: 4.3 MMOL/L — SIGNIFICANT CHANGE UP (ref 3.5–5.3)
POTASSIUM SERPL-SCNC: 4.7 MMOL/L — SIGNIFICANT CHANGE UP (ref 3.5–5.3)
PROT SERPL-MCNC: 5.9 G/DL — LOW (ref 6–8.3)
PROTHROM AB SERPL-ACNC: 12.6 SEC — SIGNIFICANT CHANGE UP (ref 10.5–13.4)
PROTHROM AB SERPL-ACNC: 12.7 SEC — SIGNIFICANT CHANGE UP (ref 10.5–13.4)
RBC # BLD: 3 M/UL — LOW (ref 4.2–5.8)
RBC # BLD: 3.62 M/UL — LOW (ref 4.2–5.8)
RBC # FLD: 13.4 % — SIGNIFICANT CHANGE UP (ref 10.3–14.5)
RBC # FLD: 13.5 % — SIGNIFICANT CHANGE UP (ref 10.3–14.5)
SARS-COV-2 RNA SPEC QL NAA+PROBE: SIGNIFICANT CHANGE UP
SODIUM SERPL-SCNC: 138 MMOL/L — SIGNIFICANT CHANGE UP (ref 135–145)
SODIUM SERPL-SCNC: 138 MMOL/L — SIGNIFICANT CHANGE UP (ref 135–145)
WBC # BLD: 4.36 K/UL — SIGNIFICANT CHANGE UP (ref 3.8–10.5)
WBC # BLD: 5.32 K/UL — SIGNIFICANT CHANGE UP (ref 3.8–10.5)
WBC # FLD AUTO: 4.36 K/UL — SIGNIFICANT CHANGE UP (ref 3.8–10.5)
WBC # FLD AUTO: 5.32 K/UL — SIGNIFICANT CHANGE UP (ref 3.8–10.5)

## 2022-03-08 PROCEDURE — 99291 CRITICAL CARE FIRST HOUR: CPT

## 2022-03-08 PROCEDURE — 99292 CRITICAL CARE ADDL 30 MIN: CPT

## 2022-03-08 PROCEDURE — 99233 SBSQ HOSP IP/OBS HIGH 50: CPT

## 2022-03-08 PROCEDURE — 71045 X-RAY EXAM CHEST 1 VIEW: CPT | Mod: 26

## 2022-03-08 RX ORDER — HYDRALAZINE HCL 50 MG
10 TABLET ORAL ONCE
Refills: 0 | Status: COMPLETED | OUTPATIENT
Start: 2022-03-08 | End: 2022-03-08

## 2022-03-08 RX ORDER — POLYETHYLENE GLYCOL 3350 17 G/17G
17 POWDER, FOR SOLUTION ORAL DAILY
Refills: 0 | Status: DISCONTINUED | OUTPATIENT
Start: 2022-03-08 | End: 2022-03-09

## 2022-03-08 RX ORDER — AMLODIPINE BESYLATE 2.5 MG/1
5 TABLET ORAL DAILY
Refills: 0 | Status: DISCONTINUED | OUTPATIENT
Start: 2022-03-08 | End: 2022-03-10

## 2022-03-08 RX ADMIN — HEPARIN SODIUM 5000 UNIT(S): 5000 INJECTION INTRAVENOUS; SUBCUTANEOUS at 22:03

## 2022-03-08 RX ADMIN — POLYETHYLENE GLYCOL 3350 17 GRAM(S): 17 POWDER, FOR SOLUTION ORAL at 11:22

## 2022-03-08 RX ADMIN — MIRTAZAPINE 7.5 MILLIGRAM(S): 45 TABLET, ORALLY DISINTEGRATING ORAL at 22:03

## 2022-03-08 RX ADMIN — DULOXETINE HYDROCHLORIDE 20 MILLIGRAM(S): 30 CAPSULE, DELAYED RELEASE ORAL at 11:22

## 2022-03-08 RX ADMIN — DEXTROAMPHETAMINE SACCHARATE, AMPHETAMINE ASPARTATE, DEXTROAMPHETAMINE SULFATE AND AMPHETAMINE SULFATE 20 MILLIGRAM(S): 1.875; 1.875; 1.875; 1.875 TABLET ORAL at 09:09

## 2022-03-08 RX ADMIN — TAMSULOSIN HYDROCHLORIDE 0.4 MILLIGRAM(S): 0.4 CAPSULE ORAL at 22:03

## 2022-03-08 RX ADMIN — Medication 6: at 11:23

## 2022-03-08 RX ADMIN — Medication 4 MILLIGRAM(S): at 18:01

## 2022-03-08 RX ADMIN — Medication 6: at 16:01

## 2022-03-08 RX ADMIN — Medication 81 MILLIGRAM(S): at 11:21

## 2022-03-08 RX ADMIN — ESCITALOPRAM OXALATE 10 MILLIGRAM(S): 10 TABLET, FILM COATED ORAL at 11:22

## 2022-03-08 RX ADMIN — Medication 2: at 06:18

## 2022-03-08 RX ADMIN — HEPARIN SODIUM 5000 UNIT(S): 5000 INJECTION INTRAVENOUS; SUBCUTANEOUS at 14:01

## 2022-03-08 RX ADMIN — AMLODIPINE BESYLATE 5 MILLIGRAM(S): 2.5 TABLET ORAL at 04:20

## 2022-03-08 RX ADMIN — PANTOPRAZOLE SODIUM 40 MILLIGRAM(S): 20 TABLET, DELAYED RELEASE ORAL at 06:08

## 2022-03-08 RX ADMIN — ATORVASTATIN CALCIUM 80 MILLIGRAM(S): 80 TABLET, FILM COATED ORAL at 22:02

## 2022-03-08 RX ADMIN — HEPARIN SODIUM 5000 UNIT(S): 5000 INJECTION INTRAVENOUS; SUBCUTANEOUS at 06:08

## 2022-03-08 RX ADMIN — CLOPIDOGREL BISULFATE 75 MILLIGRAM(S): 75 TABLET, FILM COATED ORAL at 11:21

## 2022-03-08 RX ADMIN — Medication 4 MILLIGRAM(S): at 11:22

## 2022-03-08 RX ADMIN — Medication 10 MILLIGRAM(S): at 05:17

## 2022-03-08 RX ADMIN — Medication 5 MILLIGRAM(S): at 22:02

## 2022-03-08 RX ADMIN — Medication 4: at 22:03

## 2022-03-08 NOTE — PROGRESS NOTE ADULT - SUBJECTIVE AND OBJECTIVE BOX
INTERVAL HPI/OVERNIGHT EVENTS:  Patient seen and examined. Patient is resting in chair comfortably, states he had just worked with PT and had difficulty walking with new left leg weakness. However, compared to examination yesterday, left lower extremity strength has improved.    MEDICATIONS  (STANDING):  amLODIPine   Tablet 5 milliGRAM(s) Oral daily  amphetamine/dextroamphetamine XR 20 milliGRAM(s) Oral with breakfast  aspirin  chewable 81 milliGRAM(s) Oral daily  atorvastatin 80 milliGRAM(s) Oral at bedtime  clopidogrel Tablet 75 milliGRAM(s) Oral daily  dextrose 40% Gel 15 Gram(s) Oral once  dextrose 5%. 1000 milliLiter(s) (50 mL/Hr) IV Continuous <Continuous>  dextrose 5%. 1000 milliLiter(s) (100 mL/Hr) IV Continuous <Continuous>  dextrose 50% Injectable 25 Gram(s) IV Push once  dextrose 50% Injectable 12.5 Gram(s) IV Push once  dextrose 50% Injectable 25 Gram(s) IV Push once  DULoxetine 20 milliGRAM(s) Oral daily  escitalopram 10 milliGRAM(s) Oral daily  glucagon  Injectable 1 milliGRAM(s) IntraMuscular once  heparin   Injectable 5000 Unit(s) SubCutaneous every 8 hours  insulin lispro (ADMELOG) corrective regimen sliding scale   SubCutaneous Before meals and at bedtime  melatonin 5 milliGRAM(s) Oral at bedtime  mirtazapine 7.5 milliGRAM(s) Oral at bedtime  pantoprazole    Tablet 40 milliGRAM(s) Oral before breakfast  polyethylene glycol 3350 17 Gram(s) Oral daily  sodium chloride 0.9%. 1000 milliLiter(s) (10 mL/Hr) IV Continuous <Continuous>  sodium chloride 0.9%. 1000 milliLiter(s) (50 mL/Hr) IV Continuous <Continuous>  tamsulosin 0.4 milliGRAM(s) Oral at bedtime  testosterone patch 4 mG/24 Hr(s) 4 milliGRAM(s) Transdermal daily    MEDICATIONS  (PRN):  acetaminophen     Tablet .. 650 milliGRAM(s) Oral every 6 hours PRN Mild Pain (1 - 3)      Allergies    No Known Allergies    Intolerances      Vital Signs Last 24 Hrs  T(C): 36.6 (08 Mar 2022 09:26), Max: 36.7 (07 Mar 2022 17:32)  T(F): 97.8 (08 Mar 2022 09:26), Max: 98.1 (07 Mar 2022 17:32)  HR: 101 (08 Mar 2022 13:00) (63 - 106)  BP: 154/92 (08 Mar 2022 13:00) (114/59 - 202/98)  BP(mean): 115 (08 Mar 2022 13:00) (73 - 141)  RR: 17 (08 Mar 2022 13:00) (5 - 27)  SpO2: 97% (08 Mar 2022 13:00) (91% - 100%)    Physical exam:  General: No acute distress, awake and alert  Eyes: Anicteric sclerae, moist conjunctivae, see below for CNs  Neck: trachea midline  Cardiovascular: Regular rate and rhythm, no murmurs, rubs, or gallops.  Pulmonary: Anterior breath sounds clear bilaterally, no crackles or wheezing. No use of accessory muscles  GI: Abdomen soft, non-distended, non-tender  Extremities: no edema    Neurologic:  -Mental status: Awake, alert, oriented to person, place, and time. Speech is fluent with intact naming, repetition, and comprehension. Speech is dysarthric. Recent and remote memory intact. Follows commands. Attention/concentration intact. Fund of knowledge appropriate.  -Cranial nerves:   II: Visual fields are full to confrontation.  III, IV, VI: Extraocular movements are intact without nystagmus. Pupils equally round and reactive to light  V:  Facial sensation V1-V3 equal and intact   VII: Face is symmetric with normal eye closure and smile  XII: Tongue protrudes midline  Motor: Normal bulk and tone. No pronator drift. Strength bilateral upper extremity 5/5, right lower extremity 5/5. Left lower extremity 4/5 during knee extension. Weak hip flexion. Absent dorsiflexion.   Sensation: Intact to light touch bilaterally. No neglect or extinction on double simultaneous testing.  Coordination: No dysmetria on finger-to-nose and heel-to-shin bilaterally  Reflexes: Downgoing toes bilaterally   Gait: Narrow gait and steady    LABS:                        9.2    4.36  )-----------( 152      ( 08 Mar 2022 00:29 )             27.2     03-08    138  |  106  |  23  ----------------------------<  161<H>  4.3   |  24  |  0.87    Ca    8.8      08 Mar 2022 00:29  Phos  2.8     03-08  Mg     2.1     03-08    TPro  5.9<L>  /  Alb  3.7  /  TBili  0.7  /  DBili  x   /  AST  28  /  ALT  32  /  AlkPhos  52  03-08    PT/INR - ( 08 Mar 2022 00:29 )   PT: 12.7 sec;   INR: 1.07          PTT - ( 08 Mar 2022 00:29 )  PTT:32.0 sec      RADIOLOGY & ADDITIONAL TESTS:     INTERVAL HPI/OVERNIGHT EVENTS:  Patient seen and examined. Patient is resting in chair comfortably, states he had just worked with PT and had difficulty walking with new left leg weakness. However, compared to examination yesterday, left lower extremity strength has improved.    MEDICATIONS  (STANDING):  amLODIPine   Tablet 5 milliGRAM(s) Oral daily  amphetamine/dextroamphetamine XR 20 milliGRAM(s) Oral with breakfast  aspirin  chewable 81 milliGRAM(s) Oral daily  atorvastatin 80 milliGRAM(s) Oral at bedtime  clopidogrel Tablet 75 milliGRAM(s) Oral daily  dextrose 40% Gel 15 Gram(s) Oral once  dextrose 5%. 1000 milliLiter(s) (50 mL/Hr) IV Continuous <Continuous>  dextrose 5%. 1000 milliLiter(s) (100 mL/Hr) IV Continuous <Continuous>  dextrose 50% Injectable 25 Gram(s) IV Push once  dextrose 50% Injectable 12.5 Gram(s) IV Push once  dextrose 50% Injectable 25 Gram(s) IV Push once  DULoxetine 20 milliGRAM(s) Oral daily  escitalopram 10 milliGRAM(s) Oral daily  glucagon  Injectable 1 milliGRAM(s) IntraMuscular once  heparin   Injectable 5000 Unit(s) SubCutaneous every 8 hours  insulin lispro (ADMELOG) corrective regimen sliding scale   SubCutaneous Before meals and at bedtime  melatonin 5 milliGRAM(s) Oral at bedtime  mirtazapine 7.5 milliGRAM(s) Oral at bedtime  pantoprazole    Tablet 40 milliGRAM(s) Oral before breakfast  polyethylene glycol 3350 17 Gram(s) Oral daily  sodium chloride 0.9%. 1000 milliLiter(s) (10 mL/Hr) IV Continuous <Continuous>  sodium chloride 0.9%. 1000 milliLiter(s) (50 mL/Hr) IV Continuous <Continuous>  tamsulosin 0.4 milliGRAM(s) Oral at bedtime  testosterone patch 4 mG/24 Hr(s) 4 milliGRAM(s) Transdermal daily    MEDICATIONS  (PRN):  acetaminophen     Tablet .. 650 milliGRAM(s) Oral every 6 hours PRN Mild Pain (1 - 3)      Allergies    No Known Allergies    Intolerances      Vital Signs Last 24 Hrs  T(C): 36.6 (08 Mar 2022 09:26), Max: 36.7 (07 Mar 2022 17:32)  T(F): 97.8 (08 Mar 2022 09:26), Max: 98.1 (07 Mar 2022 17:32)  HR: 101 (08 Mar 2022 13:00) (63 - 106)  BP: 154/92 (08 Mar 2022 13:00) (114/59 - 202/98)  BP(mean): 115 (08 Mar 2022 13:00) (73 - 141)  RR: 17 (08 Mar 2022 13:00) (5 - 27)  SpO2: 97% (08 Mar 2022 13:00) (91% - 100%)    Physical exam:  General: No acute distress, awake and alert  Eyes: Anicteric sclerae, moist conjunctivae, see below for CNs  Neck: trachea midline  Cardiovascular: Regular rate and rhythm, no murmurs, rubs, or gallops.  Pulmonary: Anterior breath sounds clear bilaterally, no crackles or wheezing. No use of accessory muscles  GI: Abdomen soft, non-distended, non-tender  Extremities: no edema    Neurologic:  -Mental status: Awake, alert, oriented to person, place, and time. Speech is fluent with intact naming, repetition, and comprehension. Speech is dysarthric. Recent and remote memory intact. Follows commands. Attention/concentration intact. Fund of knowledge appropriate.  -Cranial nerves:   II: Visual fields are full to confrontation.  III, IV, VI: Extraocular movements are intact without nystagmus. Pupils equally round and reactive to light  V:  Facial sensation V1-V3 equal and intact   VII: Face is symmetric with normal eye closure and smile  XII: Tongue protrudes midline  Motor: Normal bulk and tone. No pronator drift. Strength bilateral upper extremity 5/5, right lower extremity 5/5. Left lower extremity 4/5 during knee extension. Weak hip flexion. Absent dorsiflexion. Minimal strength when asked to plantar flex.  Sensation: Grossly intact bilaterally. No neglect or extinction on double simultaneous testing.    LABS:                        9.2    4.36  )-----------( 152      ( 08 Mar 2022 00:29 )             27.2     03-08    138  |  106  |  23  ----------------------------<  161<H>  4.3   |  24  |  0.87    Ca    8.8      08 Mar 2022 00:29  Phos  2.8     03-08  Mg     2.1     03-08    TPro  5.9<L>  /  Alb  3.7  /  TBili  0.7  /  DBili  x   /  AST  28  /  ALT  32  /  AlkPhos  52  03-08    PT/INR - ( 08 Mar 2022 00:29 )   PT: 12.7 sec;   INR: 1.07          PTT - ( 08 Mar 2022 00:29 )  PTT:32.0 sec      RADIOLOGY & ADDITIONAL TESTS:

## 2022-03-08 NOTE — PROGRESS NOTE ADULT - ASSESSMENT
Assessment/Plan:  77yo M undergoing BAV/PCI today complicated by failed closure device and left groin bleeding, intra-op found to have left KURTIS pseudoaneurysm s/p balloon occlusion now with resolved flow in the pseudoaneurysm. On POD#1 patient complaining of being unable to move left leg, no pain and sensation intact throughout. CTAP performed 3/5 demonstrated Left medial thigh hematoma and no collection over groin puncture site. MRI brain done 3/5 showed Multifocal punctate areas of acute ischemia involving the cerebellum bilaterally (right greater than left) as well as the right occipital and parietal lobes bilaterally. MRI spine showed chronic compression fractures that are not explaining his new deficits. No recommendations at this time. Vascular will continue to follow.

## 2022-03-08 NOTE — PROGRESS NOTE ADULT - ATTENDING COMMENTS
The patient is a 76-year-old gentleman with multiple comorbidities including spinocerebellar ataxia admitted 3/2 for elevated aortic valvuloplasty and PCI performed on 3/4.  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 Following the procedure, his was noted to have LLE weakness. MRI brain revealed multiple, small embolic appearing strokes in the posterior circulation but none that would explain his symptoms.  MRI thoracic and lumbar spine were unrevealing. CT A/P showed medial thigh hematoma which would not explain symptoms. He has had no back/leg pain apart from left groin pain.    Today's exam is improved from yesterday. 3/5 weakness of L Iliopsoas, 4+/5 adduction, 4/5 abduction 5/5 knee extension, 4/5 knee flexion (variable), 0/5 dorsiflexion, eversion, inversion with foot weakness being chronic. Reflexes are absent at ankles, symmetric and present at knees. Sensation similar to pin in both legs. Proprioception intact at toes.  He does endorse left groin pain with proximal thigh movement.    Etiology is not entirely clear. I do wonder about a proximal (within pelvis) femoral (or branch) injury vs lumbar plexus related to the KURTIS pseudoaneurysm (stretch injury vs ischemia (?watershed). An UMN process is still a possibility though the patient does not have typical clinical signs to suggest an UMN process.  Could consider MRI Pelvis with focus on plexus/femoral nerve for further evaluation; EMG in 2 weeks.  Continue therapy. Strokes likely secondary to recent procedure. No further workup needed.

## 2022-03-08 NOTE — PROGRESS NOTE ADULT - SUBJECTIVE AND OBJECTIVE BOX
24 hr events: No major events. MRI did not reveal an etiology for his LLE weakness    Vital Signs Last 24 Hrs  T(C): 36.2 (08 Mar 2022 05:11), Max: 36.7 (07 Mar 2022 17:32)  T(F): 97.1 (08 Mar 2022 05:11), Max: 98.1 (07 Mar 2022 17:32)  HR: 76 (08 Mar 2022 08:00) (63 - 93)  BP: 120/60 (08 Mar 2022 08:00) (106/57 - 202/98)  BP(mean): 84 (08 Mar 2022 08:00) (73 - 141)  RR: 12 (08 Mar 2022 08:00) (5 - 27)  SpO2: 98% (08 Mar 2022 08:00) (91% - 100%)      07 Mar 2022 07:01  -  08 Mar 2022 07:00  --------------------------------------------------------  IN: 537.5 mL / OUT: 3665 mL / NET: -3127.5 mL    08 Mar 2022 07:01  -  08 Mar 2022 09:06  --------------------------------------------------------  IN: 0 mL / OUT: 250 mL / NET: -250 mL        Physical Exam:  General: NAD, resting comfortably  Pulmonary: normal resp effort  Cardiovascular: NSR  Abdominal: soft, NT/ND  Extremities: WWP, SCDs in place, Palp DP/PT pulses bilaterally.  Neuro/MSK: Slightly slurred speech that seems to be patient's baseline.  LLE hip flexors 3/5, ankle dorsiflexion/plantar flexion 3/5, EHL/FHL 4/5. Sensation intact throughout LLE.     Lines/drains/tubes:    LABS:                        9.2    4.36  )-----------( 152      ( 08 Mar 2022 00:29 )             27.2     03-08    138  |  106  |  23  ----------------------------<  161<H>  4.3   |  24  |  0.87    Ca    8.8      08 Mar 2022 00:29  Phos  2.8     03-08  Mg     2.1     03-08    TPro  5.9<L>  /  Alb  3.7  /  TBili  0.7  /  DBili  x   /  AST  28  /  ALT  32  /  AlkPhos  52  03-08    PT/INR - ( 08 Mar 2022 00:29 )   PT: 12.7 sec;   INR: 1.07          PTT - ( 08 Mar 2022 00:29 )  PTT:32.0 sec    LIVER FUNCTIONS - ( 08 Mar 2022 00:29 )  Alb: 3.7 g/dL / Pro: 5.9 g/dL / ALK PHOS: 52 U/L / ALT: 32 U/L / AST: 28 U/L / GGT: x           CAPILLARY BLOOD GLUCOSE      POCT Blood Glucose.: 159 mg/dL (08 Mar 2022 06:15)  POCT Blood Glucose.: 212 mg/dL (07 Mar 2022 22:15)  POCT Blood Glucose.: 228 mg/dL (07 Mar 2022 17:22)  POCT Blood Glucose.: 199 mg/dL (07 Mar 2022 12:53)      RADIOLOGY & ADDITIONAL TESTS:

## 2022-03-08 NOTE — PROGRESS NOTE ADULT - ASSESSMENT
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 Stroke code called for acute left leg paralysis, NIHSS initially 4, decreased to 3 after CT scan. 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 Not tpa candidate due to recent procedure, symptoms mildly improved.   MRI brain done shows very tiny embolic strokes in b/l parietal lobes, right occipital, and b/l cerebellum, however the size and location of the strokes seen on MRI do not explain patient's left leg weakness. Left lower extremity weakness possibly d/t stretch injury 2/2 pseudoaneurysm after BAV/PCI procedure.     1)Secondary stroke prevention  - continue ASA 81mg PO daily and Plavix 75mg PO daily, for secondary stroke prevention  - continue Atorvastatin 80mg PO daily    2) Stroke risk factors  - A1C: 6.4  - LDL: 28  - HTN, HLD    3) Further management  - may maintain MAP of 90 to maintain cerebral perfusion  - recommend q4hr stroke neuro checks  - provide stroke education    DVT prophylaxis   -Heparin SQ and SCDs    Discussed with Neurology Attending Dr. Shalini Isaac

## 2022-03-08 NOTE — PROGRESS NOTE ADULT - SUBJECTIVE AND OBJECTIVE BOX
CTICU  CRITICAL  CARE  attending     Hand off received 					   Pertinent clinical, laboratory, radiographic, hemodynamic, echocardiographic, respiratory data, microbiologic data and chart were reviewed and analyzed frequently throughout the course of the day and night  Patient seen and examined with CTS/ SH attending at bedside  Pt is a 76y , Male, HEALTH ISSUES - PROBLEM Dx:      , FAMILY HISTORY:  PAST MEDICAL & SURGICAL HISTORY:  History of aortic stenosis    HTN (hypertension)    Hyperlipidemia    Diabetes mellitus    LYNDA on CPAP    Arthritis    CAD (coronary artery disease)    Ataxia, family, spinal      Patient is a 76y old  Male who presents with a chief complaint of Aortic stenosis, CAD (07 Mar 2022 16:48)      14 system review limited by mentation and multiorgan morbidity     Vital signs, hemodynamic and respiratory parameters were reviewed from the bedside nursing flowsheet.  ICU Vital Signs Last 24 Hrs  T(C): 36.2 (08 Mar 2022 05:11), Max: 36.7 (07 Mar 2022 17:32)  T(F): 97.1 (08 Mar 2022 05:11), Max: 98.1 (07 Mar 2022 17:32)  HR: 81 (08 Mar 2022 07:00) (63 - 93)  BP: 167/106 (08 Mar 2022 07:00) (106/57 - 202/98)  BP(mean): 130 (08 Mar 2022 07:00) (73 - 141)  ABP: --  ABP(mean): --  RR: 5 (08 Mar 2022 07:00) (5 - 27)  SpO2: 100% (08 Mar 2022 07:00) (91% - 100%)    Adult Advanced Hemodynamics Last 24 Hrs  CVP(mm Hg): --  CVP(cm H2O): --  CO: --  CI: --  PA: --  PA(mean): --  PCWP: --  SVR: --  SVRI: --  PVR: --  PVRI: --, ABG - ( 06 Mar 2022 09:38 )  pH, Arterial: 7.43  pH, Blood: x     /  pCO2: 42    /  pO2: 75    / HCO3: 28    / Base Excess: 3.2   /  SaO2: 96.0                Intake and output was reviewed and the fluid balance was calculated  Daily     Daily   I&O's Summary    07 Mar 2022 07:01  -  08 Mar 2022 07:00  --------------------------------------------------------  IN: 537.5 mL / OUT: 3665 mL / NET: -3127.5 mL        All lines and drain sites were assessed  Glycemic trend was reviewedCAPILLARY BLOOD GLUCOSE      POCT Blood Glucose.: 159 mg/dL (08 Mar 2022 06:15)    No acute change in focality  Auscultation of the chest reveals equal bs  Abdomen is soft  Extremities are warm and well perfused  Wounds appear clean and unremarkable  Antibiotics are periop    labs  CBC Full  -  ( 08 Mar 2022 00:29 )  WBC Count : 4.36 K/uL  RBC Count : 3.00 M/uL  Hemoglobin : 9.2 g/dL  Hematocrit : 27.2 %  Platelet Count - Automated : 152 K/uL  Mean Cell Volume : 90.7 fl  Mean Cell Hemoglobin : 30.7 pg  Mean Cell Hemoglobin Concentration : 33.8 gm/dL  Auto Neutrophil # : x  Auto Lymphocyte # : x  Auto Monocyte # : x  Auto Eosinophil # : x  Auto Basophil # : x  Auto Neutrophil % : x  Auto Lymphocyte % : x  Auto Monocyte % : x  Auto Eosinophil % : x  Auto Basophil % : x    03-08    138  |  106  |  23  ----------------------------<  161<H>  4.3   |  24  |  0.87    Ca    8.8      08 Mar 2022 00:29  Phos  2.8     03-08  Mg     2.1     03-08    TPro  5.9<L>  /  Alb  3.7  /  TBili  0.7  /  DBili  x   /  AST  28  /  ALT  32  /  AlkPhos  52  03-08    PT/INR - ( 08 Mar 2022 00:29 )   PT: 12.7 sec;   INR: 1.07          PTT - ( 08 Mar 2022 00:29 )  PTT:32.0 sec  The current medications were reviewed   MEDICATIONS  (STANDING):  amLODIPine   Tablet 5 milliGRAM(s) Oral daily  amphetamine/dextroamphetamine XR 20 milliGRAM(s) Oral with breakfast  aspirin  chewable 81 milliGRAM(s) Oral daily  atorvastatin 80 milliGRAM(s) Oral at bedtime  clopidogrel Tablet 75 milliGRAM(s) Oral daily  dextrose 40% Gel 15 Gram(s) Oral once  dextrose 5%. 1000 milliLiter(s) (50 mL/Hr) IV Continuous <Continuous>  dextrose 5%. 1000 milliLiter(s) (100 mL/Hr) IV Continuous <Continuous>  dextrose 50% Injectable 25 Gram(s) IV Push once  dextrose 50% Injectable 12.5 Gram(s) IV Push once  dextrose 50% Injectable 25 Gram(s) IV Push once  DULoxetine 20 milliGRAM(s) Oral daily  escitalopram 10 milliGRAM(s) Oral daily  glucagon  Injectable 1 milliGRAM(s) IntraMuscular once  heparin   Injectable 5000 Unit(s) SubCutaneous every 8 hours  insulin lispro (ADMELOG) corrective regimen sliding scale   SubCutaneous Before meals and at bedtime  melatonin 5 milliGRAM(s) Oral at bedtime  mirtazapine 7.5 milliGRAM(s) Oral at bedtime  pantoprazole    Tablet 40 milliGRAM(s) Oral before breakfast  polyethylene glycol 3350 17 Gram(s) Oral daily  sodium chloride 0.9%. 1000 milliLiter(s) (10 mL/Hr) IV Continuous <Continuous>  sodium chloride 0.9%. 1000 milliLiter(s) (50 mL/Hr) IV Continuous <Continuous>  tamsulosin 0.4 milliGRAM(s) Oral at bedtime    MEDICATIONS  (PRN):  acetaminophen     Tablet .. 650 milliGRAM(s) Oral every 6 hours PRN Mild Pain (1 - 3)       PROBLEM LIST/ ASSESSMENT:  HEALTH ISSUES - PROBLEM Dx:      ,   Patient is a 76y old  Male who presents with a chief complaint of Aortic stenosis, CAD (07 Mar 2022 16:48)     s/p cardiac surgery                My plan includes :  close hemodynamic, ventilatory and drain monitoring and management per post op routine    Monitor for arrhythmias and monitor parameters for organ perfusion  beta blockade not administered due to hemodynamic instability and bradycardia  monitor neurologic status  Head of the bed should remain elevated to 45 deg .   chest PT and IS will be encouraged  monitor adequacy of oxygenation and ventilation and attempt to wean oxygen  antibiotic regimen will be tailored to the clinical, laboratory and microbiologic data  Nutritional goals will be met using po eventually , ensure adequate caloric intake and montior the same  Stress ulcer and VTE prophylaxis will be achieved    Glycemic control is satisfactory  Electrolytes have been repleted as necessary and wound care has been carried out. Pain control has been achieved.   agressive physical therapy and early mobility and ambulation goals will be met   The family was updated about the course and plan  CRITICAL CARE TIME personally provided by me  in evaluation and management, reassessments, review and interpretation of labs and x-rays, ventilator and hemodynamic management, formulating a plan and coordinating care: ___90____ MIN.  Time does not include procedural time. Time spent was non routine post-operarive caRE and included multiple and repeated evaluations at the bedside  CTICU ATTENDING     					    King Malone MD

## 2022-03-09 LAB
ALBUMIN SERPL ELPH-MCNC: 4.1 G/DL — SIGNIFICANT CHANGE UP (ref 3.3–5)
ALP SERPL-CCNC: 60 U/L — SIGNIFICANT CHANGE UP (ref 40–120)
ALT FLD-CCNC: 33 U/L — SIGNIFICANT CHANGE UP (ref 10–45)
ANION GAP SERPL CALC-SCNC: 14 MMOL/L — SIGNIFICANT CHANGE UP (ref 5–17)
APTT BLD: 34.9 SEC — SIGNIFICANT CHANGE UP (ref 27.5–35.5)
AST SERPL-CCNC: 27 U/L — SIGNIFICANT CHANGE UP (ref 10–40)
BILIRUB SERPL-MCNC: 1 MG/DL — SIGNIFICANT CHANGE UP (ref 0.2–1.2)
BUN SERPL-MCNC: 22 MG/DL — SIGNIFICANT CHANGE UP (ref 7–23)
CALCIUM SERPL-MCNC: 9.9 MG/DL — SIGNIFICANT CHANGE UP (ref 8.4–10.5)
CHLORIDE SERPL-SCNC: 103 MMOL/L — SIGNIFICANT CHANGE UP (ref 96–108)
CO2 SERPL-SCNC: 23 MMOL/L — SIGNIFICANT CHANGE UP (ref 22–31)
CREAT SERPL-MCNC: 1.1 MG/DL — SIGNIFICANT CHANGE UP (ref 0.5–1.3)
EGFR: 69 ML/MIN/1.73M2 — SIGNIFICANT CHANGE UP
GLUCOSE BLDC GLUCOMTR-MCNC: 189 MG/DL — HIGH (ref 70–99)
GLUCOSE BLDC GLUCOMTR-MCNC: 223 MG/DL — HIGH (ref 70–99)
GLUCOSE BLDC GLUCOMTR-MCNC: 231 MG/DL — HIGH (ref 70–99)
GLUCOSE BLDC GLUCOMTR-MCNC: 296 MG/DL — HIGH (ref 70–99)
GLUCOSE SERPL-MCNC: 155 MG/DL — HIGH (ref 70–99)
HCT VFR BLD CALC: 32.1 % — LOW (ref 39–50)
HGB BLD-MCNC: 10.9 G/DL — LOW (ref 13–17)
INR BLD: 1.14 — SIGNIFICANT CHANGE UP (ref 0.88–1.16)
MAGNESIUM SERPL-MCNC: 1.9 MG/DL — SIGNIFICANT CHANGE UP (ref 1.6–2.6)
MCHC RBC-ENTMCNC: 30.7 PG — SIGNIFICANT CHANGE UP (ref 27–34)
MCHC RBC-ENTMCNC: 34 GM/DL — SIGNIFICANT CHANGE UP (ref 32–36)
MCV RBC AUTO: 90.4 FL — SIGNIFICANT CHANGE UP (ref 80–100)
NRBC # BLD: 0 /100 WBCS — SIGNIFICANT CHANGE UP (ref 0–0)
PHOSPHATE SERPL-MCNC: 3.6 MG/DL — SIGNIFICANT CHANGE UP (ref 2.5–4.5)
PLATELET # BLD AUTO: 199 K/UL — SIGNIFICANT CHANGE UP (ref 150–400)
POTASSIUM SERPL-MCNC: 4.5 MMOL/L — SIGNIFICANT CHANGE UP (ref 3.5–5.3)
POTASSIUM SERPL-SCNC: 4.5 MMOL/L — SIGNIFICANT CHANGE UP (ref 3.5–5.3)
PROT SERPL-MCNC: 6.7 G/DL — SIGNIFICANT CHANGE UP (ref 6–8.3)
PROTHROM AB SERPL-ACNC: 13.6 SEC — HIGH (ref 10.5–13.4)
RBC # BLD: 3.55 M/UL — LOW (ref 4.2–5.8)
RBC # FLD: 13.2 % — SIGNIFICANT CHANGE UP (ref 10.3–14.5)
SODIUM SERPL-SCNC: 140 MMOL/L — SIGNIFICANT CHANGE UP (ref 135–145)
WBC # BLD: 6.2 K/UL — SIGNIFICANT CHANGE UP (ref 3.8–10.5)
WBC # FLD AUTO: 6.2 K/UL — SIGNIFICANT CHANGE UP (ref 3.8–10.5)

## 2022-03-09 PROCEDURE — 99232 SBSQ HOSP IP/OBS MODERATE 35: CPT

## 2022-03-09 PROCEDURE — 71045 X-RAY EXAM CHEST 1 VIEW: CPT | Mod: 26

## 2022-03-09 RX ORDER — INSULIN GLARGINE 100 [IU]/ML
10 INJECTION, SOLUTION SUBCUTANEOUS AT BEDTIME
Refills: 0 | Status: DISCONTINUED | OUTPATIENT
Start: 2022-03-09 | End: 2022-03-10

## 2022-03-09 RX ORDER — METOPROLOL TARTRATE 50 MG
12.5 TABLET ORAL EVERY 12 HOURS
Refills: 0 | Status: DISCONTINUED | OUTPATIENT
Start: 2022-03-09 | End: 2022-03-10

## 2022-03-09 RX ORDER — HYDRALAZINE HCL 50 MG
10 TABLET ORAL ONCE
Refills: 0 | Status: COMPLETED | OUTPATIENT
Start: 2022-03-09 | End: 2022-03-09

## 2022-03-09 RX ORDER — FUROSEMIDE 40 MG
20 TABLET ORAL DAILY
Refills: 0 | Status: DISCONTINUED | OUTPATIENT
Start: 2022-03-09 | End: 2022-03-10

## 2022-03-09 RX ORDER — MAGNESIUM OXIDE 400 MG ORAL TABLET 241.3 MG
400 TABLET ORAL ONCE
Refills: 0 | Status: COMPLETED | OUTPATIENT
Start: 2022-03-09 | End: 2022-03-09

## 2022-03-09 RX ORDER — SODIUM CHLORIDE 9 MG/ML
3 INJECTION INTRAMUSCULAR; INTRAVENOUS; SUBCUTANEOUS EVERY 8 HOURS
Refills: 0 | Status: DISCONTINUED | OUTPATIENT
Start: 2022-03-09 | End: 2022-03-10

## 2022-03-09 RX ADMIN — Medication 2: at 06:26

## 2022-03-09 RX ADMIN — Medication 5 MILLIGRAM(S): at 22:08

## 2022-03-09 RX ADMIN — PANTOPRAZOLE SODIUM 40 MILLIGRAM(S): 20 TABLET, DELAYED RELEASE ORAL at 06:10

## 2022-03-09 RX ADMIN — Medication 81 MILLIGRAM(S): at 11:40

## 2022-03-09 RX ADMIN — Medication 4: at 18:10

## 2022-03-09 RX ADMIN — HEPARIN SODIUM 5000 UNIT(S): 5000 INJECTION INTRAVENOUS; SUBCUTANEOUS at 14:29

## 2022-03-09 RX ADMIN — HEPARIN SODIUM 5000 UNIT(S): 5000 INJECTION INTRAVENOUS; SUBCUTANEOUS at 06:10

## 2022-03-09 RX ADMIN — CLOPIDOGREL BISULFATE 75 MILLIGRAM(S): 75 TABLET, FILM COATED ORAL at 11:40

## 2022-03-09 RX ADMIN — INSULIN GLARGINE 10 UNIT(S): 100 INJECTION, SOLUTION SUBCUTANEOUS at 22:08

## 2022-03-09 RX ADMIN — Medication 6: at 12:46

## 2022-03-09 RX ADMIN — Medication 4 MILLIGRAM(S): at 11:41

## 2022-03-09 RX ADMIN — Medication 650 MILLIGRAM(S): at 08:45

## 2022-03-09 RX ADMIN — Medication 650 MILLIGRAM(S): at 02:42

## 2022-03-09 RX ADMIN — DULOXETINE HYDROCHLORIDE 20 MILLIGRAM(S): 30 CAPSULE, DELAYED RELEASE ORAL at 11:41

## 2022-03-09 RX ADMIN — Medication 12.5 MILLIGRAM(S): at 18:10

## 2022-03-09 RX ADMIN — MAGNESIUM OXIDE 400 MG ORAL TABLET 400 MILLIGRAM(S): 241.3 TABLET ORAL at 06:10

## 2022-03-09 RX ADMIN — Medication 4 MILLIGRAM(S): at 07:51

## 2022-03-09 RX ADMIN — SODIUM CHLORIDE 3 MILLILITER(S): 9 INJECTION INTRAMUSCULAR; INTRAVENOUS; SUBCUTANEOUS at 21:04

## 2022-03-09 RX ADMIN — DEXTROAMPHETAMINE SACCHARATE, AMPHETAMINE ASPARTATE, DEXTROAMPHETAMINE SULFATE AND AMPHETAMINE SULFATE 20 MILLIGRAM(S): 1.875; 1.875; 1.875; 1.875 TABLET ORAL at 09:54

## 2022-03-09 RX ADMIN — Medication 650 MILLIGRAM(S): at 02:12

## 2022-03-09 RX ADMIN — Medication 4: at 22:10

## 2022-03-09 RX ADMIN — Medication 4 MILLIGRAM(S): at 11:00

## 2022-03-09 RX ADMIN — HEPARIN SODIUM 5000 UNIT(S): 5000 INJECTION INTRAVENOUS; SUBCUTANEOUS at 22:08

## 2022-03-09 RX ADMIN — Medication 12.5 MILLIGRAM(S): at 08:10

## 2022-03-09 RX ADMIN — Medication 20 MILLIGRAM(S): at 08:15

## 2022-03-09 RX ADMIN — TAMSULOSIN HYDROCHLORIDE 0.4 MILLIGRAM(S): 0.4 CAPSULE ORAL at 22:08

## 2022-03-09 RX ADMIN — Medication 10 MILLIGRAM(S): at 01:34

## 2022-03-09 RX ADMIN — ATORVASTATIN CALCIUM 80 MILLIGRAM(S): 80 TABLET, FILM COATED ORAL at 22:08

## 2022-03-09 RX ADMIN — Medication 4 MILLIGRAM(S): at 17:58

## 2022-03-09 RX ADMIN — ESCITALOPRAM OXALATE 10 MILLIGRAM(S): 10 TABLET, FILM COATED ORAL at 11:41

## 2022-03-09 RX ADMIN — Medication 650 MILLIGRAM(S): at 08:15

## 2022-03-09 RX ADMIN — AMLODIPINE BESYLATE 5 MILLIGRAM(S): 2.5 TABLET ORAL at 06:10

## 2022-03-09 RX ADMIN — POLYETHYLENE GLYCOL 3350 17 GRAM(S): 17 POWDER, FOR SOLUTION ORAL at 11:41

## 2022-03-09 NOTE — PROGRESS NOTE ADULT - ASSESSMENT
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 Stroke code called for acute left leg paralysis, NIHSS initially 4, decreased to 3 after CT scan. 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 Not tpa candidate due to recent procedure, symptoms mildly improved.   MRI brain done shows very tiny embolic strokes in b/l parietal lobes, right occipital, and b/l cerebellum, however the size and location of the strokes seen on MRI do not explain patient's left leg weakness, likely periprocedural. Left lower extremity weakness possibly d/t stretch injury 2/2 pseudoaneurysm after BAV/PCI procedure.     1)Secondary stroke prevention  - continue ASA 81mg PO daily and Plavix 75mg PO daily, for secondary stroke prevention  - continue Atorvastatin 80mg PO daily    2) Stroke risk factors  - A1C: 6.4  - LDL: 28  - HTN, HLD    3) Further management  - recommend q4hr stroke neuro checks  - provide stroke education  -pt needs outpatient neurology follow up with Dr. Madi Mota for EMG in 3-4 weeks, please have patient call [phone] to schedule.   -Pt continues to improve, will need further rehab, no further recommendations from stroke team  -please reconsult stroke neurology as needed    DVT prophylaxis   -Heparin SQ and SCDs    Discussed with Neurology Attending Dr. 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 for feeling faint, found to have severe AS on CT angio, [facility] on 3/2 which showed severe prox and mid-distal LAD, POD 1 for BAV/PCI. Stroke code called for acute left leg paralysis, NIHSS initially 4, decreased to 3 after CT scan. 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 Not tpa candidate due to recent procedure, symptoms mildly improved.   MRI brain done shows very tiny embolic strokes in b/l parietal lobes, right occipital, and b/l cerebellum, however the size and location of the strokes seen on MRI do not explain patient's left leg weakness, likely periprocedural. Left lower extremity weakness possibly d/t stretch injury 2/2 pseudoaneurysm after BAV/PCI procedure.     1)Secondary stroke prevention  - continue ASA 81mg PO daily and Plavix 75mg PO daily, for secondary stroke prevention  - continue Atorvastatin 80mg PO daily    2) Stroke risk factors  - A1C: 6.4  - LDL: 28  - HTN, HLD    3) Further management  - recommend q4hr stroke neuro checks  - provide stroke education  -pt's strokes on MRI likely related to procedure, no further workup necessary  -pt needs outpatient neurology follow up with Dr. Madi Mota for EMG in 3-4 weeks, please have patient call [phone] to schedule.   -Pt continues to improve, will need further rehab, no further recommendations from stroke team  -please reconsult stroke neurology as needed    DVT prophylaxis   -Heparin SQ and SCDs    Discussed with Neurology Attending Dr. Shalini Isaac

## 2022-03-09 NOTE — PROGRESS NOTE ADULT - SUBJECTIVE AND OBJECTIVE BOX
Neurology Stroke Progress Note    INTERVAL HPI/OVERNIGHT EVENTS:  Patient seen and examined. Pt states that he felt like he improved more from 2 days ago to yesterday, is not sure that he improved much between yesterday and today.     MEDICATIONS  (STANDING):  amLODIPine   Tablet 5 milliGRAM(s) Oral daily  amphetamine/dextroamphetamine XR 20 milliGRAM(s) Oral with breakfast  aspirin  chewable 81 milliGRAM(s) Oral daily  atorvastatin 80 milliGRAM(s) Oral at bedtime  clopidogrel Tablet 75 milliGRAM(s) Oral daily  dextrose 40% Gel 15 Gram(s) Oral once  dextrose 5%. 1000 milliLiter(s) (50 mL/Hr) IV Continuous <Continuous>  dextrose 5%. 1000 milliLiter(s) (100 mL/Hr) IV Continuous <Continuous>  dextrose 50% Injectable 25 Gram(s) IV Push once  dextrose 50% Injectable 12.5 Gram(s) IV Push once  dextrose 50% Injectable 25 Gram(s) IV Push once  DULoxetine 20 milliGRAM(s) Oral daily  escitalopram 10 milliGRAM(s) Oral daily  furosemide    Tablet 20 milliGRAM(s) Oral daily  glucagon  Injectable 1 milliGRAM(s) IntraMuscular once  heparin   Injectable 5000 Unit(s) SubCutaneous every 8 hours  insulin lispro (ADMELOG) corrective regimen sliding scale   SubCutaneous Before meals and at bedtime  melatonin 5 milliGRAM(s) Oral at bedtime  metoprolol tartrate 12.5 milliGRAM(s) Oral every 12 hours  mirtazapine 7.5 milliGRAM(s) Oral at bedtime  pantoprazole    Tablet 40 milliGRAM(s) Oral before breakfast  polyethylene glycol 3350 17 Gram(s) Oral daily  sodium chloride 0.9%. 1000 milliLiter(s) (10 mL/Hr) IV Continuous <Continuous>  sodium chloride 0.9%. 1000 milliLiter(s) (50 mL/Hr) IV Continuous <Continuous>  tamsulosin 0.4 milliGRAM(s) Oral at bedtime  testosterone patch 4 mG/24 Hr(s) 4 milliGRAM(s) Transdermal daily    MEDICATIONS  (PRN):  acetaminophen     Tablet .. 650 milliGRAM(s) Oral every 6 hours PRN Mild Pain (1 - 3)      Allergies    No Known Allergies    Intolerances        Vital Signs Last 24 Hrs  T(C): 36.3 (09 Mar 2022 09:40), Max: 36.7 (08 Mar 2022 14:12)  T(F): 97.4 (09 Mar 2022 09:40), Max: 98.1 (09 Mar 2022 01:01)  HR: 87 (09 Mar 2022 13:00) (66 - 111)  BP: 121/67 (09 Mar 2022 12:00) (106/62 - 162/96)  BP(mean): 87 (09 Mar 2022 12:00) (79 - 123)  RR: 20 (09 Mar 2022 13:00) (16 - 32)  SpO2: 95% (09 Mar 2022 13:00) (91% - 100%)    Physical exam:  General: No acute distress, awake and alert    Neurologic:  -Mental status: Awake, alert, oriented to person, place, and time. Speech is fluent with intact naming, repetition, and comprehension, no dysarthria. Recent and remote memory intact. Follows commands. Attention/concentration intact. Fund of knowledge appropriate.  -Cranial nerves:   II: Visual fields are full to confrontation.  III, IV, VI: Extraocular movements are intact without nystagmus. Pupils equally round and reactive to light  V:  Facial sensation V1-V3 equal and intact   VII: Face is symmetric   Motor: Normal bulk and tone. No pronator drift. Strength bilateral upper extremity 5/5, right LE 5/5. Improving left LE strength, hip flexion 4/5, abduction 4+/5, adduction 4/5, knee extension and flexion 4+/5, dorsiflexion 3+/5  Sensation: Intact to light touch bilaterally. No neglect or extinction on double simultaneous testing.  Coordination: No dysmetria on finger-to-nose bilaterally  Reflexes: Downgoing toes bilaterally. 2+ reflexes throughout    LABS:                        10.9   6.20  )-----------( 199      ( 09 Mar 2022 02:33 )             32.1     03-09    140  |  103  |  22  ----------------------------<  155<H>  4.5   |  23  |  1.10    Ca    9.9      09 Mar 2022 02:33  Phos  3.6     03-09  Mg     1.9     03-09    TPro  6.7  /  Alb  4.1  /  TBili  1.0  /  DBili  x   /  AST  27  /  ALT  33  /  AlkPhos  60  03-09    PT/INR - ( 09 Mar 2022 02:33 )   PT: 13.6 sec;   INR: 1.14          PTT - ( 09 Mar 2022 02:33 )  PTT:34.9 sec      RADIOLOGY & ADDITIONAL TESTS:

## 2022-03-09 NOTE — PROGRESS NOTE ADULT - SUBJECTIVE AND OBJECTIVE BOX
24 hr events  No major events. The patient has been working with physical therapy, using a walker to take few steps. He is pending dispo to rehab.    Vital Signs Last 24 Hrs  T(C): 36.6 (09 Mar 2022 14:35), Max: 36.7 (09 Mar 2022 01:01)  T(F): 97.8 (09 Mar 2022 14:35), Max: 98.1 (09 Mar 2022 01:01)  HR: 92 (09 Mar 2022 15:00) (66 - 111)  BP: 122/61 (09 Mar 2022 15:00) (106/62 - 161/97)  BP(mean): 84 (09 Mar 2022 15:00) (79 - 123)  RR: 24 (09 Mar 2022 15:00) (16 - 32)  SpO2: 96% (09 Mar 2022 15:00) (91% - 100%)    I&O's Summary    08 Mar 2022 07:01  -  09 Mar 2022 07:00  --------------------------------------------------------  IN: 780 mL / OUT: 2565 mL / NET: -1785 mL    09 Mar 2022 07:01  -  09 Mar 2022 16:14  --------------------------------------------------------  IN: 400 mL / OUT: 905 mL / NET: -505 mL        Physical Exam:  General: NAD, resting comfortably  Pulmonary: normal resp effort  Cardiovascular: NSR  Abdominal: soft, NT/ND  Extremities: WWP, SCDs in place, Palp DP/PT pulses bilaterally.  Neuro/MSK: Slightly slurred speech that seems to be patient's baseline.  LLE hip flexors, adduction, abduction, knee extension 4/5, L ankle dorsiflexion/plantar flexion 3/5, Sensation intact throughout LLE.     Lines/drains/tubes:    LABS:                        10.9   6.20  )-----------( 199      ( 09 Mar 2022 02:33 )             32.1     03-09    140  |  103  |  22  ----------------------------<  155<H>  4.5   |  23  |  1.10    Ca    9.9      09 Mar 2022 02:33  Phos  3.6     03-09  Mg     1.9     03-09    TPro  6.7  /  Alb  4.1  /  TBili  1.0  /  DBili  x   /  AST  27  /  ALT  33  /  AlkPhos  60  03-09    PT/INR - ( 09 Mar 2022 02:33 )   PT: 13.6 sec;   INR: 1.14          PTT - ( 09 Mar 2022 02:33 )  PTT:34.9 sec    LIVER FUNCTIONS - ( 09 Mar 2022 02:33 )  Alb: 4.1 g/dL / Pro: 6.7 g/dL / ALK PHOS: 60 U/L / ALT: 33 U/L / AST: 27 U/L / GGT: x           CAPILLARY BLOOD GLUCOSE      POCT Blood Glucose.: 296 mg/dL (09 Mar 2022 12:27)  POCT Blood Glucose.: 189 mg/dL (09 Mar 2022 06:24)  POCT Blood Glucose.: 222 mg/dL (08 Mar 2022 21:24)      RADIOLOGY & ADDITIONAL TESTS:

## 2022-03-09 NOTE — PROGRESS NOTE ADULT - TIME BILLING
review of patient information including recent vital signs, labs, imaging, and notes; assessing, examining patient; updating patient/family; discussion and coordination of care with multidisciplinary team.

## 2022-03-09 NOTE — PROGRESS NOTE ADULT - ATTENDING COMMENTS
The patient is a 76-year-old gentleman with multiple comorbidities including spinocerebellar ataxia admitted 3/2 for elevated aortic valvuloplasty and PCI performed on 3/4.  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 Following the procedure, his was noted to have LLE weakness. MRI brain revealed multiple, small embolic appearing strokes in the posterior circulation but none that would explain his symptoms.  MRI thoracic and lumbar spine were unrevealing. CT A/P showed medial thigh hematoma which would not explain symptoms. He has had no back/leg pain apart from left groin pain.    Today's exam is improving from yesterday. 3/5 weakness of L Iliopsoas, 4+/5 adduction, 4/5 abduction 5/5 knee extension, 4/5 knee flexion (variable), 2/5 dorsiflexion, eversion, inversion, 3/5 dorsiflexion. Reflexes are absent at ankles, symmetric and present at knees.     Etiology is not entirely clear. I do wonder about a proximal (within pelvis) femoral branch injury vs lumbar plexus (though would expect sensory involvement) related to the procedure. An UMN process is still a possibility though the patient does not have typical clinical signs to suggest an UMN process. Rec'd he continue PT. Outpatient neuro with EMG in 2-4 weeks weeks.  Continue therapy. Strokes likely secondary to recent procedure. No further workup needed.

## 2022-03-09 NOTE — PROGRESS NOTE ADULT - ASSESSMENT
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 On POD#1 patient complaining of being unable to move left leg, no pain and sensation intact throughout. CTAP performed 3/5 demonstrated Left medial thigh hematoma and no collection over groin puncture site. MRI brain done 3/5 showed Multifocal punctate areas of acute ischemia involving the cerebellum bilaterally (right greater than left) as well as the right occipital and parietal lobes bilaterally. MRI spine showed chronic compression fractures that are not explaining his new deficits. No recommendations at this time. Vascular will continue to follow.

## 2022-03-10 ENCOUNTER — TRANSCRIPTION ENCOUNTER (OUTPATIENT)
Age: 77
End: 2022-03-10

## 2022-03-10 VITALS
HEART RATE: 95 BPM | SYSTOLIC BLOOD PRESSURE: 123 MMHG | DIASTOLIC BLOOD PRESSURE: 95 MMHG | RESPIRATION RATE: 18 BRPM | OXYGEN SATURATION: 96 %

## 2022-03-10 LAB
GLUCOSE BLDC GLUCOMTR-MCNC: 155 MG/DL — HIGH (ref 70–99)
GLUCOSE BLDC GLUCOMTR-MCNC: 267 MG/DL — HIGH (ref 70–99)
GLUCOSE BLDC GLUCOMTR-MCNC: 289 MG/DL — HIGH (ref 70–99)

## 2022-03-10 PROCEDURE — 97112 NEUROMUSCULAR REEDUCATION: CPT

## 2022-03-10 PROCEDURE — 71045 X-RAY EXAM CHEST 1 VIEW: CPT

## 2022-03-10 PROCEDURE — 82962 GLUCOSE BLOOD TEST: CPT

## 2022-03-10 PROCEDURE — 80061 LIPID PANEL: CPT

## 2022-03-10 PROCEDURE — 86900 BLOOD TYPING SEROLOGIC ABO: CPT

## 2022-03-10 PROCEDURE — C1894: CPT

## 2022-03-10 PROCEDURE — C1760: CPT

## 2022-03-10 PROCEDURE — C1874: CPT

## 2022-03-10 PROCEDURE — 94150 VITAL CAPACITY TEST: CPT

## 2022-03-10 PROCEDURE — 72157 MRI CHEST SPINE W/O & W/DYE: CPT

## 2022-03-10 PROCEDURE — 70496 CT ANGIOGRAPHY HEAD: CPT

## 2022-03-10 PROCEDURE — 83880 ASSAY OF NATRIURETIC PEPTIDE: CPT

## 2022-03-10 PROCEDURE — 84443 ASSAY THYROID STIM HORMONE: CPT

## 2022-03-10 PROCEDURE — 75573 CT HRT C+ STRUX CGEN HRT DS: CPT

## 2022-03-10 PROCEDURE — 93321 DOPPLER ECHO F-UP/LMTD STD: CPT

## 2022-03-10 PROCEDURE — P9045: CPT

## 2022-03-10 PROCEDURE — 82803 BLOOD GASES ANY COMBINATION: CPT

## 2022-03-10 PROCEDURE — 82947 ASSAY GLUCOSE BLOOD QUANT: CPT

## 2022-03-10 PROCEDURE — C1887: CPT

## 2022-03-10 PROCEDURE — A9585: CPT

## 2022-03-10 PROCEDURE — 97530 THERAPEUTIC ACTIVITIES: CPT

## 2022-03-10 PROCEDURE — C1769: CPT

## 2022-03-10 PROCEDURE — C1725: CPT

## 2022-03-10 PROCEDURE — 87635 SARS-COV-2 COVID-19 AMP PRB: CPT

## 2022-03-10 PROCEDURE — 93306 TTE W/DOPPLER COMPLETE: CPT

## 2022-03-10 PROCEDURE — 74174 CTA ABD&PLVS W/CONTRAST: CPT

## 2022-03-10 PROCEDURE — C1724: CPT

## 2022-03-10 PROCEDURE — C8929: CPT

## 2022-03-10 PROCEDURE — 83735 ASSAY OF MAGNESIUM: CPT

## 2022-03-10 PROCEDURE — 0042T: CPT

## 2022-03-10 PROCEDURE — 81003 URINALYSIS AUTO W/O SCOPE: CPT

## 2022-03-10 PROCEDURE — 93005 ELECTROCARDIOGRAM TRACING: CPT

## 2022-03-10 PROCEDURE — 86850 RBC ANTIBODY SCREEN: CPT

## 2022-03-10 PROCEDURE — 83605 ASSAY OF LACTIC ACID: CPT

## 2022-03-10 PROCEDURE — 86923 COMPATIBILITY TEST ELECTRIC: CPT

## 2022-03-10 PROCEDURE — 84100 ASSAY OF PHOSPHORUS: CPT

## 2022-03-10 PROCEDURE — 86901 BLOOD TYPING SEROLOGIC RH(D): CPT

## 2022-03-10 PROCEDURE — 93880 EXTRACRANIAL BILAT STUDY: CPT

## 2022-03-10 PROCEDURE — 97116 GAIT TRAINING THERAPY: CPT

## 2022-03-10 PROCEDURE — 85014 HEMATOCRIT: CPT

## 2022-03-10 PROCEDURE — P9016: CPT

## 2022-03-10 PROCEDURE — 85730 THROMBOPLASTIN TIME PARTIAL: CPT

## 2022-03-10 PROCEDURE — 70450 CT HEAD/BRAIN W/O DYE: CPT

## 2022-03-10 PROCEDURE — 72149 MRI LUMBAR SPINE W/DYE: CPT

## 2022-03-10 PROCEDURE — 70551 MRI BRAIN STEM W/O DYE: CPT

## 2022-03-10 PROCEDURE — 36430 TRANSFUSION BLD/BLD COMPNT: CPT

## 2022-03-10 PROCEDURE — 97161 PT EVAL LOW COMPLEX 20 MIN: CPT

## 2022-03-10 PROCEDURE — 82330 ASSAY OF CALCIUM: CPT

## 2022-03-10 PROCEDURE — 94660 CPAP INITIATION&MGMT: CPT

## 2022-03-10 PROCEDURE — 85027 COMPLETE CBC AUTOMATED: CPT

## 2022-03-10 PROCEDURE — 84295 ASSAY OF SERUM SODIUM: CPT

## 2022-03-10 PROCEDURE — 80048 BASIC METABOLIC PNL TOTAL CA: CPT

## 2022-03-10 PROCEDURE — 97110 THERAPEUTIC EXERCISES: CPT

## 2022-03-10 PROCEDURE — 70498 CT ANGIOGRAPHY NECK: CPT

## 2022-03-10 PROCEDURE — 97164 PT RE-EVAL EST PLAN CARE: CPT

## 2022-03-10 PROCEDURE — C1889: CPT

## 2022-03-10 PROCEDURE — 84132 ASSAY OF SERUM POTASSIUM: CPT

## 2022-03-10 PROCEDURE — 83036 HEMOGLOBIN GLYCOSYLATED A1C: CPT

## 2022-03-10 PROCEDURE — 36415 COLL VENOUS BLD VENIPUNCTURE: CPT

## 2022-03-10 PROCEDURE — 85610 PROTHROMBIN TIME: CPT

## 2022-03-10 PROCEDURE — 85025 COMPLETE CBC W/AUTO DIFF WBC: CPT

## 2022-03-10 PROCEDURE — 80053 COMPREHEN METABOLIC PANEL: CPT

## 2022-03-10 RX ORDER — ACETAMINOPHEN 500 MG
2 TABLET ORAL
Qty: 0 | Refills: 0 | DISCHARGE
Start: 2022-03-10

## 2022-03-10 RX ORDER — POLYETHYLENE GLYCOL 3350 17 G/17G
17 POWDER, FOR SOLUTION ORAL
Qty: 0 | Refills: 0 | DISCHARGE
Start: 2022-03-10

## 2022-03-10 RX ORDER — LANOLIN ALCOHOL/MO/W.PET/CERES
1 CREAM (GRAM) TOPICAL
Qty: 0 | Refills: 0 | DISCHARGE
Start: 2022-03-10

## 2022-03-10 RX ORDER — CLOPIDOGREL BISULFATE 75 MG/1
1 TABLET, FILM COATED ORAL
Qty: 0 | Refills: 0 | DISCHARGE
Start: 2022-03-10

## 2022-03-10 RX ORDER — AMLODIPINE BESYLATE 2.5 MG/1
1 TABLET ORAL
Qty: 0 | Refills: 0 | DISCHARGE
Start: 2022-03-10

## 2022-03-10 RX ORDER — FUROSEMIDE 40 MG
1 TABLET ORAL
Qty: 0 | Refills: 0 | DISCHARGE
Start: 2022-03-10

## 2022-03-10 RX ORDER — VALSARTAN 80 MG/1
1 TABLET ORAL
Qty: 0 | Refills: 0 | DISCHARGE

## 2022-03-10 RX ORDER — PANTOPRAZOLE SODIUM 20 MG/1
1 TABLET, DELAYED RELEASE ORAL
Qty: 0 | Refills: 0 | DISCHARGE
Start: 2022-03-10

## 2022-03-10 RX ORDER — DULOXETINE HYDROCHLORIDE 30 MG/1
1 CAPSULE, DELAYED RELEASE ORAL
Qty: 0 | Refills: 0 | DISCHARGE
Start: 2022-03-10

## 2022-03-10 RX ADMIN — ESCITALOPRAM OXALATE 10 MILLIGRAM(S): 10 TABLET, FILM COATED ORAL at 11:22

## 2022-03-10 RX ADMIN — Medication 12.5 MILLIGRAM(S): at 17:04

## 2022-03-10 RX ADMIN — DULOXETINE HYDROCHLORIDE 20 MILLIGRAM(S): 30 CAPSULE, DELAYED RELEASE ORAL at 11:21

## 2022-03-10 RX ADMIN — Medication 6: at 17:34

## 2022-03-10 RX ADMIN — Medication 4 MILLIGRAM(S): at 11:10

## 2022-03-10 RX ADMIN — HEPARIN SODIUM 5000 UNIT(S): 5000 INJECTION INTRAVENOUS; SUBCUTANEOUS at 14:57

## 2022-03-10 RX ADMIN — Medication 2: at 07:12

## 2022-03-10 RX ADMIN — MIRTAZAPINE 7.5 MILLIGRAM(S): 45 TABLET, ORALLY DISINTEGRATING ORAL at 00:49

## 2022-03-10 RX ADMIN — Medication 81 MILLIGRAM(S): at 11:22

## 2022-03-10 RX ADMIN — Medication 12.5 MILLIGRAM(S): at 07:12

## 2022-03-10 RX ADMIN — POLYETHYLENE GLYCOL 3350 17 GRAM(S): 17 POWDER, FOR SOLUTION ORAL at 11:26

## 2022-03-10 RX ADMIN — Medication 4 MILLIGRAM(S): at 17:02

## 2022-03-10 RX ADMIN — SODIUM CHLORIDE 3 MILLILITER(S): 9 INJECTION INTRAMUSCULAR; INTRAVENOUS; SUBCUTANEOUS at 07:03

## 2022-03-10 RX ADMIN — Medication 4 MILLIGRAM(S): at 11:23

## 2022-03-10 RX ADMIN — PANTOPRAZOLE SODIUM 40 MILLIGRAM(S): 20 TABLET, DELAYED RELEASE ORAL at 07:11

## 2022-03-10 RX ADMIN — Medication 6: at 12:14

## 2022-03-10 RX ADMIN — Medication 20 MILLIGRAM(S): at 07:11

## 2022-03-10 RX ADMIN — AMLODIPINE BESYLATE 5 MILLIGRAM(S): 2.5 TABLET ORAL at 07:11

## 2022-03-10 RX ADMIN — CLOPIDOGREL BISULFATE 75 MILLIGRAM(S): 75 TABLET, FILM COATED ORAL at 11:22

## 2022-03-10 RX ADMIN — HEPARIN SODIUM 5000 UNIT(S): 5000 INJECTION INTRAVENOUS; SUBCUTANEOUS at 07:12

## 2022-03-10 RX ADMIN — SODIUM CHLORIDE 3 MILLILITER(S): 9 INJECTION INTRAMUSCULAR; INTRAVENOUS; SUBCUTANEOUS at 14:21

## 2022-03-10 RX ADMIN — DEXTROAMPHETAMINE SACCHARATE, AMPHETAMINE ASPARTATE, DEXTROAMPHETAMINE SULFATE AND AMPHETAMINE SULFATE 20 MILLIGRAM(S): 1.875; 1.875; 1.875; 1.875 TABLET ORAL at 09:22

## 2022-03-10 RX ADMIN — Medication 4 MILLIGRAM(S): at 07:41

## 2022-03-10 NOTE — DISCHARGE NOTE PROVIDER - NSDCFUADDAPPT_GEN_ALL_CORE_FT
Please see Dr. Tirado in his office after you are discharged from rehab. Please call [phone] to schedule this appointment.    Please also follow up with Dr. Walters (vascular surgery), Dr. Isaac (neurology), and Dr. Rodriguez (referring cardiologist). Our office will call you with these appointments but if you do not hear from us please call [phone].

## 2022-03-10 NOTE — DISCHARGE NOTE NURSING/CASE MANAGEMENT/SOCIAL WORK - PATIENT PORTAL LINK FT
You can access the FollowMyHealth Patient Portal offered by [facility] by registering at the following website: [email or web address]. By joining [facility]’s FollowMyHealth portal, you will also be able to view your health information using other applications (apps) compatible with our system.

## 2022-03-10 NOTE — DISCHARGE NOTE PROVIDER - NSDCMRMEDTOKEN_GEN_ALL_CORE_FT
Adderall:   aspirin 81 mg oral delayed release tablet: 1 tab(s) orally once a day  Flomax 0.4 mg oral capsule: 1 cap(s) orally once a day  glipiZIDE: 20 milligram(s) orally once a day  Lexapro 10 mg oral tablet: 1 tab(s) orally once a day  Lipitor 80 mg oral tablet: 1 tab(s) orally once a day  metFORMIN 1000 mg oral tablet, extended release: 1 tab(s) orally once a day   mirtazapine 7.5 mg oral tablet: 1 tab(s) orally once a day (at bedtime)  riluzole:   valsartan 40 mg oral tablet: 1 tab(s) orally 2 times a day   acetaminophen 325 mg oral tablet: 2 tab(s) orally every 6 hours, As needed, Mild Pain (1 - 3)  Adderall:   amLODIPine 5 mg oral tablet: 1 tab(s) orally once a day  aspirin 81 mg oral delayed release tablet: 1 tab(s) orally once a day  clopidogrel 75 mg oral tablet: 1 tab(s) orally once a day  DULoxetine 20 mg oral delayed release capsule: 1 cap(s) orally once a day  Flomax 0.4 mg oral capsule: 1 cap(s) orally once a day  furosemide 20 mg oral tablet: 1 tab(s) orally once a day  glipiZIDE: 20 milligram(s) orally once a day  Lexapro 10 mg oral tablet: 1 tab(s) orally once a day  Lipitor 80 mg oral tablet: 1 tab(s) orally once a day  melatonin 5 mg oral tablet: 1 tab(s) orally once a day (at bedtime)  metFORMIN 1000 mg oral tablet, extended release: 1 tab(s) orally once a day   Metoprolol Tartrate 25 mg oral tablet: 0.5 tab(s) orally every 12 hours  mirtazapine 7.5 mg oral tablet: 1 tab(s) orally once a day (at bedtime)  pantoprazole 40 mg oral delayed release tablet: 1 tab(s) orally once a day (before a meal)  polyethylene glycol 3350 oral powder for reconstitution: 17 gram(s) orally once a day  riluzole:

## 2022-03-10 NOTE — PROGRESS NOTE ADULT - PROVIDER SPECIALTY LIST ADULT
Critical Care
Critical Care
Vascular Surgery
Critical Care
Neurology
Vascular Surgery
Vascular Surgery
CT Surgery
CT Surgery
Critical Care
Neurology
Structural Heart
Vascular Surgery
Neurology
Neurology

## 2022-03-10 NOTE — DISCHARGE NOTE NURSING/CASE MANAGEMENT/SOCIAL WORK - NSDCFUADDAPPT_GEN_ALL_CORE_FT
Please see Dr. Tirado in his office after you are discharged from rehab. Please call [phone] to schedule this appointment.    Please also follow up with Dr. Walters (vascular surgery), Dr. Isaac (neurology), and Dr. Rodriguez (referring cardiologist). Our office will call you with these appointments but if you do not hear from us please call [phone].

## 2022-03-10 NOTE — DISCHARGE NOTE PROVIDER - CARE PROVIDERS DIRECT ADDRESSES
,[email or web address],[email or web address],DirectAddress_Unknown

## 2022-03-10 NOTE — PROGRESS NOTE ADULT - REASON FOR ADMISSION
Aortic stenosis, CAD

## 2022-03-10 NOTE — DISCHARGE NOTE NURSING/CASE MANAGEMENT/SOCIAL WORK - NSDCPEFALRISK_GEN_ALL_CORE
For information on Fall & Injury Prevention, visit: [email or web address] OR  [email or web address] OR  https://www.cdc.gov/steadi/patient.html

## 2022-03-10 NOTE — PROGRESS NOTE ADULT - SUBJECTIVE AND OBJECTIVE BOX
Vascular Surgery Progress Note    Pt seen and examined at bedside. Endorses significant improvement in LLE strength. Pt will possibly be discharged to rehab later today. Denies CP, SOB, LE, numbness.     Vital Signs Last 24 Hrs  T(C): 36.3 (03-10-22 @ 14:19), Max: 36.3 (03-10-22 @ 14:19)  T(F): 97.3 (03-10-22 @ 14:19), Max: 97.3 (03-10-22 @ 14:19)  HR: 99 (03-10-22 @ 13:50) (84 - 105)  BP: 138/77 (03-10-22 @ 13:50) (115/62 - 149/95)  BP(mean): 98 (03-10-22 @ 13:50) (82 - 116)  RR: 18 (03-10-22 @ 11:35) (18 - 18)  SpO2: 96% (03-10-22 @ 13:50) (96% - 97%)  I&O's Summary    09 Mar 2022 07:01  -  10 Mar 2022 07:00  --------------------------------------------------------  IN: 650 mL / OUT: 2295 mL / NET: -1645 mL    10 Mar 2022 07:01  -  10 Mar 2022 14:51  --------------------------------------------------------  IN: 750 mL / OUT: 1000 mL / NET: -250 mL        Physical Exam:  General: Pt Alert and oriented, NAD  Extremities: WWP, SCDs in place, Palp DP/PT pulses bilaterally.  Neuro/MSK: Slightly slurred speech that seems to be patient's baseline.  LLE hip flexors, adduction, abduction, knee extension 4/5, L ankle dorsiflexion/plantar flexion 3/5, Sensation intact throughout LLE.                           10.9   6.20  )-----------( 199      ( 09 Mar 2022 02:33 )             32.1     03-09    140  |  103  |  22  ----------------------------<  155<H>  4.5   |  23  |  1.10    Ca    9.9      09 Mar 2022 02:33  Phos  3.6     03-09  Mg     1.9     03-09    TPro  6.7  /  Alb  4.1  /  TBili  1.0  /  DBili  x   /  AST  27  /  ALT  33  /  AlkPhos  60  03-09      Assessment/Plan: 75yo M undergoing BAV/PCI today complicated by failed closure device and left groin bleeding, intra-op found to have left KURTIS pseudoaneurysm s/p balloon occlusion now with resolved flow in the pseudoaneurysm. On POD#1 patient complaining of being unable to move left leg, no pain and sensation intact throughout. CTAP performed 3/5 demonstrated Left medial thigh hematoma and no collection over groin puncture site. MRI brain done 3/5 showed Multifocal punctate areas of acute ischemia involving the cerebellum bilaterally (right greater than left) as well as the right occipital and parietal lobes bilaterally. MRI spine showed chronic compression fractures that are not explaining his new deficits.   - stable  - L foot drop with significant improvement in strength with serial exams  - no additional vascular recommendations at this time  - vascular will continue to follow daily     Chucky Kohler, PGY-1

## 2022-03-10 NOTE — DISCHARGE NOTE PROVIDER - CARE PROVIDER_API CALL
Bryce Tirado)  Cardiovascular Surgery  [address] Floor, [place] [address]  Phone: [phone]  Fax: [phone]  Follow Up Time:     Enrique Walters)  Surgery; Vascular Surgery  [address] [place] [address]  Phone: [phone]  Fax: [phone]  Follow Up Time:     Shalini Isaac)  Neurology  [address] [place] [id number]  Phone: [phone]  Fax: [phone]  Follow Up Time:     Marjorie Rodriguez)  Cardiovascular Disease; Internal Medicine; Interventional Cardiology  [address]  [place] [address]  Phone: [phone]  Fax: [phone]  Follow Up Time:

## 2022-03-10 NOTE — DISCHARGE NOTE PROVIDER - NSDCCPCAREPLAN_GEN_ALL_CORE_FT
PRINCIPAL DISCHARGE DIAGNOSIS  Diagnosis: Aortic stenosis  Assessment and Plan of Treatment:       SECONDARY DISCHARGE DIAGNOSES  Diagnosis: CAD (coronary artery disease)  Assessment and Plan of Treatment:

## 2022-03-10 NOTE — DISCHARGE NOTE PROVIDER - HOSPITAL COURSE
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  Resulted in some bruising to his right face, and a quarter size hematoma right forehead.  Intraop course also notable for placement of a chun catheter which drained 3L of urine. 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  Resulted in some bruising to his right face, and a quarter size hematoma right forehead. 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  Resulted in some bruising to his right face, and a quarter size hematoma right forehead.  CT head at the OSH was negative for any brain bleed.  No other injuries sustained.  While at [place] he was found to have severe AS and CT Angio.  He was then transferred to [facility] for cardiac cath which revealed severe prox and mid-distal LAD disease. He was transferred to [facility] under the care of Dr. Tirado for further management. On 3/4/22 he underwent a BAV/ PCI, EF normal. Intraop course complicated by bleeding after removal of left groin femoral sheath and expanding hematoma. Vascular surgery was consulted intra procedure. 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 Blood flow resolved after inflation of balloon. Intra op course also notable for chun placement with drainage of 3L urine. Post operatively he was brought to [facility] in stable condition, extubated.  He was briefly hypotensive post procedure and was started on levo. POD1 he woke up and complained of inability to move his LLE. Sensation remained in tact. Stroke code was called, CT scan negative, MRI revealed acute cerebellar microemboli. POD2 CTA abdomen/ pelvis was negative for hematoma/ compression. MRI lumbar spine was unchanged. POD3-4 movement in LLE improved. POD5 transferred to [facility]. As per Dr. Tirado the patient was stable and ready for discharge to acute rehab on POD6, 3/10/22. Of note chun was kept in place for significant urinary retention intraop. At time of discharge patient was saturating well on room air, tolerating PO diet, having bowel movements, and felt ready for discharge home.     Over 30 minutes was spent with the patient reviewing the discharge material including medications, follow up appointments, recovery, concerning symptoms, and how to contact their health care providers if they have questions

## 2022-03-10 NOTE — PROGRESS NOTE ADULT - SUBJECTIVE AND OBJECTIVE BOX
Patient discussed on morning rounds with Dr. Tirado    Operation / Date:  3/4 BAV/PCI, EF normal     Surgeon: Dr. Tirado    Referring Physician: Dr. Rodriguez    SUBJECTIVE ASSESSMENT:  76y Male assessed at bedside this morning. Patient states he feels better today but is frustrated by his inability to move his left leg much. Denies chest pain, SOB, fever, chills, nausea, vomiting. Eager and ready for discharge to rehab.      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  Resulted in some bruising to his right face, and a quarter size hematoma right forehead.  CT head at the OSH was negative for any brain bleed.  No other injuries sustained.  While at [place] he was found to have severe AS and CT Angio.  He was then transferred to [facility] for cardiac cath which revealed severe prox and mid-distal LAD disease. He was transferred to [facility] under the care of Dr. Tirado for further management. On 3/4/22 he underwent a BAV/ PCI, EF normal. Intraop course complicated by bleeding after removal of left groin femoral sheath and expanding hematoma. Vascular surgery was consulted intra procedure. 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 Blood flow resolved after inflation of balloon. Intra op course also notable for chun placement with drainage of 3L urine. Post operatively he was brought to [facility] in stable condition, extubated.  He was briefly hypotensive post procedure and was started on levo. POD1 he woke up and complained of inability to move his LLE. Sensation remained in tact. Stroke code was called, CT scan negative, MRI revealed acute cerebellar microemboli. POD2 CTA abdomen/ pelvis was negative for hematoma/ compression. MRI lumbar spine was unchanged. POD3-4 movement in LLE improved. POD5 transferred to [facility]. As per Dr. Tirado the patient was stable and ready for discharge to acute rehab on POD6, 3/10/22. Of note chun was kept in place for significant urinary retention intraop. At time of discharge patient was saturating well on room air, tolerating PO diet, having bowel movements, and felt ready for discharge home.     Over 30 minutes was spent with the patient reviewing the discharge material including medications, follow up appointments, recovery, concerning symptoms, and how to contact their health care providers if they have questions      Vital Signs Last 24 Hrs  T(C): 36.3 (10 Mar 2022 14:19), Max: 36.4 (09 Mar 2022 20:54)  T(F): 97.3 (10 Mar 2022 14:19), Max: 97.6 (09 Mar 2022 20:54)  HR: 99 (10 Mar 2022 13:50) (64 - 105)  BP: 138/77 (10 Mar 2022 13:50) (109/63 - 150/87)  BP(mean): 98 (10 Mar 2022 13:50) (80 - 116)  RR: 18 (10 Mar 2022 11:35) (16 - 23)  SpO2: 96% (10 Mar 2022 13:50) (92% - 100%)    EPICARDIAL WIRES REMOVED: N/A  TIE DOWNS REMOVED: N/A    Physical Exam  CONSTITUTIONAL: Well appearing in NAD assessed laying comfortably in bed   NEURO: A&OX3. No focal deficits noted, moving bilateral upper and lower extremities                    CV: RRR, no murmurs, rubs, gallops  RESPIRATORY: Clear to auscultation bilateral posterior lung fields, no wheezes, rales, rhonchi   GI: +BS, NT/ND  MUSKULOSKELETAL: No peripheral edema or calf tenderness. Full strength and ROM bilateral upper and lower extremities   VASCULAR: Bilateral distal pulses 2+  INCISIONS: left groin soft, no obvious hematoma. some ecchymosis. Right groin soft.     LABS:                        10.9   6.20  )-----------( 199      ( 09 Mar 2022 02:33 )             32.1       COUMADIN:  No    PT/INR - ( 09 Mar 2022 02:33 )   PT: 13.6 sec;   INR: 1.14          PTT - ( 09 Mar 2022 02:33 )  PTT:34.9 sec    03-09    140  |  103  |  22  ----------------------------<  155<H>  4.5   |  23  |  1.10    Ca    9.9      09 Mar 2022 02:33  Phos  3.6     03-09  Mg     1.9     03-09    TPro  6.7  /  Alb  4.1  /  TBili  1.0  /  DBili  x   /  AST  27  /  ALT  33  /  AlkPhos  60  03-09    Discharge CXR:  < from: Xray Chest 1 View- PORTABLE-Routine (Xray Chest 1 View- PORTABLE-Routine in AM.) (03.09.22 @ 05:25) >  Findings/  impression: Right basilar focal atelectasis. Heart and mediastinum are   unremarkable. Stable bony structures.    < end of copied text >      Discharge ECHO:  < from: TTE Echo Complete w/ Contrast w/ Doppler (03.05.22 @ 08:52) >  CONCLUSIONS:     1. Moderate symmetric left ventricular hypertrophy.   2. Normal left ventricular size and systolic function.   3. Normal right ventricular size and systolic function.   4. Severe aortic stenosis. Peak transvalvular velocity is 4.66 m/s, the   mean transvalvular gradient is 59.00 mmHg, and the LVOT/AV velocity ratio   is0.23. The peak transaortic gradient is 86.86 mmHg. The aortic valve   area (estimated via the continuity method) is 0.73 cm².   5. Mild aortic regurgitation.   6. No other significant valvular disease.   7. Trivial pericardial effusion.    < end of copied text >    Med Reconciliation:  Medication Reconciliation Status	Admission Reconciliation is Completed  Discharge Reconciliation is Completed  	  Discharge Medications	acetaminophen 325 mg oral tablet: 2 tab(s) orally every 6 hours, As needed, Mild Pain (1 - 3)  Adderall:   amLODIPine 5 mg oral tablet: 1 tab(s) orally once a day  aspirin 81 mg oral delayed release tablet: 1 tab(s) orally once a day  clopidogrel 75 mg oral tablet: 1 tab(s) orally once a day  DULoxetine 20 mg oral delayed release capsule: 1 cap(s) orally once a day  Flomax 0.4 mg oral capsule: 1 cap(s) orally once a day  furosemide 20 mg oral tablet: 1 tab(s) orally once a day  glipiZIDE: 20 milligram(s) orally once a day  Lexapro 10 mg oral tablet: 1 tab(s) orally once a day  Lipitor 80 mg oral tablet: 1 tab(s) orally once a day  melatonin 5 mg oral tablet: 1 tab(s) orally once a day (at bedtime)  metFORMIN 1000 mg oral tablet, extended release: 1 tab(s) orally once a day   Metoprolol Tartrate 25 mg oral tablet: 0.5 tab(s) orally every 12 hours  mirtazapine 7.5 mg oral tablet: 1 tab(s) orally once a day (at bedtime)  pantoprazole 40 mg oral delayed release tablet: 1 tab(s) orally once a day (before a meal)  polyethylene glycol 3350 oral powder for reconstitution: 17 gram(s) orally once a day  riluzole:  	  ,  ,     Care Plan/Procedures:  Discharge Diagnoses, Assessment and Plan of Treatment	PRINCIPAL DISCHARGE DIAGNOSIS  Diagnosis: Aortic stenosis  Assessment and Plan of Treatment:       SECONDARY DISCHARGE DIAGNOSES  Diagnosis: CAD (coronary artery disease)  Assessment and Plan of Treatment:       Discharge Procedures, Findings and Treatment	PRINCIPAL PROCEDURE  Procedure: Balloon valvuloplasty, aortic  Findings and Treatment:         Goal(s)	To get better and follow your care plan as instructed.     Follow Up:  Care Providers for Follow up (PCP/Outpatient Provider)	Bryce Tirado)  Cardiovascular Surgery  [address], [place] [address]  Phone: [phone]  Fax: [phone]  Follow Up Time:     Enrique Walters)  Surgery; Vascular Surgery  [address] [place] [address]  Phone: [phone]  Fax: [phone]  Follow Up Time:     Shalini Isaac)  Neurology  [address] [address]  Phone: [phone]  Fax: [phone]  Follow Up Time:     Marjorie Rodriguez)  Cardiovascular Disease; Internal Medicine; Interventional Cardiology  [address]  [place] [address]  Phone: [phone]  Fax: [phone]  Follow Up Time:     Additional Scheduled Appointments	Please see Dr. Tirado in his office after you are discharged from rehab. Please call [phone] to schedule this appointment.    Please also follow up with Dr. Walters (vascular surgery), Dr. Isaac (neurology), and Dr. Rodriguez (referring cardiologist). Our office will call you with these appointments but if you do not hear from us please call [phone].   Discharge Diet	DASH Diet  Activity	Do not drive or operate machinery, Do not make important decisions, No heavy lifting/straining, Stairs allowed, Walking - Indoors allowed, Walking - Outdoors allowed, Follow Instructions Provided by your Surgical Team  Additional Instructions	-Walk daily as tolerated and use your incentive spirometer 10 times every hour while you are awake.     -Please weigh yourself daily. If you notice over a 3 pound weight gain in 3 days, this is a sign you are likely retaining too much fluid. It is imperative you call our right away with unexplained rapid weight gain.      -Please continue to wear the compression stockings given to you in the hospital at home. This is a way to prevent fluid from building up in your legs.     -No driving or strenuous activity/exercise until cleared by your surgeon.    -Gently clean your incisions with unscented/antibacterial soap and water, pat dry.  You may leave them open to air.    -Call your doctor if you have shortness of breath, chest pain not relieved by pain medication, dizziness, fever >101.5, or increased redness or drainage from incisions.

## 2022-03-10 NOTE — DISCHARGE NOTE PROVIDER - PROVIDER TOKENS
PROVIDER:[TOKEN:[[id number]]],PROVIDER:[TOKEN:[[id number]]],PROVIDER:[TOKEN:[[id number]]],PROVIDER:[TOKEN:[[id number]]]

## 2022-03-14 LAB
ISTAT ARTERIAL BE: -1 MMOL/L — SIGNIFICANT CHANGE UP (ref -2–3)
ISTAT ARTERIAL GLUCOSE: 161 MG/DL — HIGH (ref 70–99)
ISTAT ARTERIAL HCO3: 25 MMOL/L — SIGNIFICANT CHANGE UP (ref 22–26)
ISTAT ARTERIAL HEMATOCRIT: 29 % — LOW (ref 39–50)
ISTAT ARTERIAL HEMOGLOBIN: 9.9 G/DL — LOW (ref 13–17)
ISTAT ARTERIAL IONIZED CALCIUM: 1.2 MMOL/L — SIGNIFICANT CHANGE UP (ref 1.12–1.3)
ISTAT ARTERIAL PCO2: 48 MMHG — HIGH (ref 35–45)
ISTAT ARTERIAL PH: 7.33 — LOW (ref 7.35–7.45)
ISTAT ARTERIAL PO2: 79 MMHG — LOW (ref 80–105)
ISTAT ARTERIAL POTASSIUM: 4.4 MMOL/L — SIGNIFICANT CHANGE UP (ref 3.5–5.3)
ISTAT ARTERIAL SO2: 94 % — LOW (ref 95–98)
ISTAT ARTERIAL SODIUM: 139 MMOL/L — SIGNIFICANT CHANGE UP (ref 135–145)
ISTAT ARTERIAL TCO2: 27 MMOL/L — SIGNIFICANT CHANGE UP (ref 22–31)

## 2022-03-23 NOTE — PHYSICAL THERAPY INITIAL EVALUATION ADULT - IMPAIRMENTS FOUND, PT EVAL
Patient: Roula Cook    Procedure Summary     Date: 03/23/22 Room / Location: [facility] ENDOSCOPY 2 /  [facility] OR    Anesthesia Start: 1301 Anesthesia Stop: 1312    Procedure: ESOPHAGOGASTRODUODENOSCOPY WITH BIOPSY (N/A ) Diagnosis:       Right upper quadrant abdominal pain      Epigastric pain      Nausea      (Right upper quadrant abdominal pain [R10.11])      (Epigastric pain [R10.13])      (Nausea [R11.0])    Surgeons: Nitesh Benoit MD Provider: Marques Bass CRNA    Anesthesia Type: MAC ASA Status: 3          Anesthesia Type: MAC    Vitals  Vitals Value Taken Time   /56 03/23/22 1320   Temp 97.5 °F (36.4 °C) 03/23/22 1314   Pulse 65 03/23/22 1320   Resp 14 03/23/22 1320   SpO2 98 % 03/23/22 1320           Post Anesthesia Care and Evaluation    Patient location during evaluation: PHASE II  Patient participation: complete - patient participated  Level of consciousness: awake  Pain management: adequate  Airway patency: patent  Anesthetic complications: No anesthetic complications  PONV Status: none  Cardiovascular status: acceptable  Respiratory status: acceptable  Hydration status: acceptable       aerobic capacity/endurance/gait, locomotion, and balance/muscle strength

## 2022-04-27 DIAGNOSIS — I63.9 CEREBRAL INFARCTION, UNSPECIFIED: ICD-10-CM

## 2022-05-02 ENCOUNTER — TRANSCRIPTION ENCOUNTER (OUTPATIENT)
Age: 77
End: 2022-05-02

## 2022-05-07 ENCOUNTER — RESULT REVIEW (OUTPATIENT)
Age: 77
End: 2022-05-07

## 2022-05-13 ENCOUNTER — NON-APPOINTMENT (OUTPATIENT)
Age: 77
End: 2022-05-13

## 2022-05-13 ENCOUNTER — APPOINTMENT (OUTPATIENT)
Dept: CARDIOTHORACIC SURGERY | Facility: CLINIC | Age: 77
End: 2022-05-13
Payer: MEDICARE

## 2022-05-13 DIAGNOSIS — Z86.39 PERSONAL HISTORY OF OTHER ENDOCRINE, NUTRITIONAL AND METABOLIC DISEASE: ICD-10-CM

## 2022-05-13 DIAGNOSIS — Z86.79 PERSONAL HISTORY OF OTHER DISEASES OF THE CIRCULATORY SYSTEM: ICD-10-CM

## 2022-05-13 DIAGNOSIS — Z87.891 PERSONAL HISTORY OF NICOTINE DEPENDENCE: ICD-10-CM

## 2022-05-13 PROCEDURE — 99024 POSTOP FOLLOW-UP VISIT: CPT

## 2022-05-16 ENCOUNTER — TRANSCRIPTION ENCOUNTER (OUTPATIENT)
Age: 77
End: 2022-05-16

## 2022-05-16 ENCOUNTER — NON-APPOINTMENT (OUTPATIENT)
Age: 77
End: 2022-05-16

## 2022-05-16 ENCOUNTER — LABORATORY RESULT (OUTPATIENT)
Age: 77
End: 2022-05-16

## 2022-05-16 VITALS
RESPIRATION RATE: 16 BRPM | SYSTOLIC BLOOD PRESSURE: 146 MMHG | HEIGHT: 72 IN | WEIGHT: 210.1 LBS | TEMPERATURE: 98 F | OXYGEN SATURATION: 97 % | DIASTOLIC BLOOD PRESSURE: 68 MMHG | HEART RATE: 95 BPM

## 2022-05-16 PROBLEM — Z87.891 FORMER SMOKER: Status: ACTIVE | Noted: 2022-05-16

## 2022-05-16 PROBLEM — Z86.79 HISTORY OF HYPERTENSION: Status: RESOLVED | Noted: 2022-05-16 | Resolved: 2022-05-16

## 2022-05-16 PROBLEM — Z86.39 HISTORY OF HYPERLIPIDEMIA: Status: RESOLVED | Noted: 2022-05-16 | Resolved: 2022-05-16

## 2022-05-16 PROBLEM — Z86.39 HISTORY OF DIABETES MELLITUS: Status: RESOLVED | Noted: 2022-05-16 | Resolved: 2022-05-16

## 2022-05-16 RX ORDER — ESCITALOPRAM OXALATE 10 MG/1
10 TABLET, FILM COATED ORAL
Refills: 0 | Status: ACTIVE | COMMUNITY

## 2022-05-16 RX ORDER — ATORVASTATIN CALCIUM 80 MG/1
80 TABLET, FILM COATED ORAL
Qty: 30 | Refills: 2 | Status: ACTIVE | COMMUNITY

## 2022-05-16 RX ORDER — VALSARTAN 40 MG/1
40 TABLET, COATED ORAL
Refills: 0 | Status: ACTIVE | COMMUNITY

## 2022-05-16 RX ORDER — FAMOTIDINE 20 MG/1
20 TABLET, FILM COATED ORAL
Refills: 0 | Status: ACTIVE | COMMUNITY

## 2022-05-16 RX ORDER — CLOPIDOGREL 75 MG/1
75 TABLET, FILM COATED ORAL
Refills: 0 | Status: ACTIVE | COMMUNITY

## 2022-05-16 RX ORDER — GLIPIZIDE 10 MG/1
10 TABLET, FILM COATED, EXTENDED RELEASE ORAL
Refills: 0 | Status: ACTIVE | COMMUNITY

## 2022-05-16 RX ORDER — METFORMIN HYDROCHLORIDE 500 MG/1
500 TABLET, FILM COATED, EXTENDED RELEASE ORAL
Refills: 0 | Status: ACTIVE | COMMUNITY

## 2022-05-16 RX ORDER — DEXTROAMPHETAMINE SACCHARATE, AMPHETAMINE ASPARTATE, DEXTROAMPHETAMINE SULFATE, AND AMPHETAMINE SULFATE 5; 5; 5; 5 MG/1; MG/1; MG/1; MG/1
20 TABLET ORAL
Refills: 0 | Status: ACTIVE | COMMUNITY

## 2022-05-16 RX ORDER — TAMSULOSIN HYDROCHLORIDE 0.4 MG/1
0.4 CAPSULE ORAL
Refills: 0 | Status: ACTIVE | COMMUNITY

## 2022-05-16 RX ORDER — RILUZOLE 50 MG/1
TABLET, FILM COATED ORAL
Refills: 0 | Status: ACTIVE | COMMUNITY

## 2022-05-16 RX ORDER — MIRTAZAPINE 7.5 MG/1
7.5 TABLET, FILM COATED ORAL
Refills: 0 | Status: ACTIVE | COMMUNITY

## 2022-05-16 NOTE — PATIENT PROFILE ADULT - FALL HARM RISK - RISK INTERVENTIONS

## 2022-05-17 ENCOUNTER — APPOINTMENT (OUTPATIENT)
Dept: CARDIOTHORACIC SURGERY | Facility: CLINIC | Age: 77
End: 2022-05-17
Payer: MEDICARE

## 2022-05-17 ENCOUNTER — INPATIENT (INPATIENT)
Facility: HOSPITAL | Age: 77
LOS: 1 days | Discharge: NURSING, REHAB OR LONG-TERM CARE | DRG: 267 | End: 2022-05-19
Attending: INTERNAL MEDICINE | Admitting: INTERNAL MEDICINE
Payer: MEDICARE

## 2022-05-17 ENCOUNTER — NON-APPOINTMENT (OUTPATIENT)
Age: 77
End: 2022-05-17

## 2022-05-17 ENCOUNTER — TRANSCRIPTION ENCOUNTER (OUTPATIENT)
Age: 77
End: 2022-05-17

## 2022-05-17 ENCOUNTER — APPOINTMENT (OUTPATIENT)
Dept: CARDIOTHORACIC SURGERY | Facility: HOSPITAL | Age: 77
End: 2022-05-17

## 2022-05-17 DIAGNOSIS — Z98.890 OTHER SPECIFIED POSTPROCEDURAL STATES: Chronic | ICD-10-CM

## 2022-05-17 LAB
ALBUMIN SERPL ELPH-MCNC: 3.7 G/DL — SIGNIFICANT CHANGE UP (ref 3.3–5)
ALBUMIN SERPL ELPH-MCNC: 4.7 G/DL — SIGNIFICANT CHANGE UP (ref 3.3–5)
ALP SERPL-CCNC: 62 U/L — SIGNIFICANT CHANGE UP (ref 40–120)
ALP SERPL-CCNC: 67 U/L — SIGNIFICANT CHANGE UP (ref 40–120)
ALT FLD-CCNC: 16 U/L — SIGNIFICANT CHANGE UP (ref 10–45)
ALT FLD-CCNC: 20 U/L — SIGNIFICANT CHANGE UP (ref 10–45)
ANION GAP SERPL CALC-SCNC: 12 MMOL/L — SIGNIFICANT CHANGE UP (ref 5–17)
ANION GAP SERPL CALC-SCNC: 8 MMOL/L — SIGNIFICANT CHANGE UP (ref 5–17)
ANION GAP SERPL CALC-SCNC: 8 MMOL/L — SIGNIFICANT CHANGE UP (ref 5–17)
APTT BLD: 31.8 SEC — SIGNIFICANT CHANGE UP (ref 27.5–35.5)
APTT BLD: 31.8 SEC — SIGNIFICANT CHANGE UP (ref 27.5–35.5)
AST SERPL-CCNC: 19 U/L — SIGNIFICANT CHANGE UP (ref 10–40)
AST SERPL-CCNC: 21 U/L — SIGNIFICANT CHANGE UP (ref 10–40)
BASOPHILS # BLD AUTO: 0.01 K/UL — SIGNIFICANT CHANGE UP (ref 0–0.2)
BASOPHILS # BLD AUTO: 0.02 K/UL — SIGNIFICANT CHANGE UP (ref 0–0.2)
BASOPHILS NFR BLD AUTO: 0.2 % — SIGNIFICANT CHANGE UP (ref 0–2)
BASOPHILS NFR BLD AUTO: 0.5 % — SIGNIFICANT CHANGE UP (ref 0–2)
BILIRUB SERPL-MCNC: 0.5 MG/DL — SIGNIFICANT CHANGE UP (ref 0.2–1.2)
BILIRUB SERPL-MCNC: 0.6 MG/DL — SIGNIFICANT CHANGE UP (ref 0.2–1.2)
BLD GP AB SCN SERPL QL: NEGATIVE — SIGNIFICANT CHANGE UP
BUN SERPL-MCNC: 24 MG/DL — HIGH (ref 7–23)
BUN SERPL-MCNC: 27 MG/DL — HIGH (ref 7–23)
BUN SERPL-MCNC: 30 MG/DL — HIGH (ref 7–23)
CALCIUM SERPL-MCNC: 10.1 MG/DL — SIGNIFICANT CHANGE UP (ref 8.4–10.5)
CALCIUM SERPL-MCNC: 9 MG/DL — SIGNIFICANT CHANGE UP (ref 8.4–10.5)
CALCIUM SERPL-MCNC: 9.3 MG/DL — SIGNIFICANT CHANGE UP (ref 8.4–10.5)
CHLORIDE SERPL-SCNC: 102 MMOL/L — SIGNIFICANT CHANGE UP (ref 96–108)
CHLORIDE SERPL-SCNC: 104 MMOL/L — SIGNIFICANT CHANGE UP (ref 96–108)
CHLORIDE SERPL-SCNC: 105 MMOL/L — SIGNIFICANT CHANGE UP (ref 96–108)
CO2 SERPL-SCNC: 25 MMOL/L — SIGNIFICANT CHANGE UP (ref 22–31)
CREAT SERPL-MCNC: 0.87 MG/DL — SIGNIFICANT CHANGE UP (ref 0.5–1.3)
CREAT SERPL-MCNC: 0.88 MG/DL — SIGNIFICANT CHANGE UP (ref 0.5–1.3)
CREAT SERPL-MCNC: 0.95 MG/DL — SIGNIFICANT CHANGE UP (ref 0.5–1.3)
EGFR: 82 ML/MIN/1.73M2 — SIGNIFICANT CHANGE UP
EGFR: 89 ML/MIN/1.73M2 — SIGNIFICANT CHANGE UP
EGFR: 89 ML/MIN/1.73M2 — SIGNIFICANT CHANGE UP
EOSINOPHIL # BLD AUTO: 0.17 K/UL — SIGNIFICANT CHANGE UP (ref 0–0.5)
EOSINOPHIL # BLD AUTO: 0.23 K/UL — SIGNIFICANT CHANGE UP (ref 0–0.5)
EOSINOPHIL NFR BLD AUTO: 3.9 % — SIGNIFICANT CHANGE UP (ref 0–6)
EOSINOPHIL NFR BLD AUTO: 5.6 % — SIGNIFICANT CHANGE UP (ref 0–6)
GAS PNL BLDA: SIGNIFICANT CHANGE UP
GLUCOSE BLDC GLUCOMTR-MCNC: 120 MG/DL — HIGH (ref 70–99)
GLUCOSE BLDC GLUCOMTR-MCNC: 159 MG/DL — HIGH (ref 70–99)
GLUCOSE BLDC GLUCOMTR-MCNC: 88 MG/DL — SIGNIFICANT CHANGE UP (ref 70–99)
GLUCOSE SERPL-MCNC: 111 MG/DL — HIGH (ref 70–99)
GLUCOSE SERPL-MCNC: 122 MG/DL — HIGH (ref 70–99)
GLUCOSE SERPL-MCNC: 131 MG/DL — HIGH (ref 70–99)
HCT VFR BLD CALC: 31.3 % — LOW (ref 39–50)
HCT VFR BLD CALC: 33.1 % — LOW (ref 39–50)
HCT VFR BLD CALC: 35.5 % — LOW (ref 39–50)
HGB BLD-MCNC: 10.8 G/DL — LOW (ref 13–17)
HGB BLD-MCNC: 11.1 G/DL — LOW (ref 13–17)
HGB BLD-MCNC: 12.2 G/DL — LOW (ref 13–17)
IMM GRANULOCYTES NFR BLD AUTO: 0.7 % — SIGNIFICANT CHANGE UP (ref 0–1.5)
IMM GRANULOCYTES NFR BLD AUTO: 0.7 % — SIGNIFICANT CHANGE UP (ref 0–1.5)
INR BLD: 1.06 — SIGNIFICANT CHANGE UP (ref 0.88–1.16)
INR BLD: 1.14 — SIGNIFICANT CHANGE UP (ref 0.88–1.16)
INR BLD: 1.15 — SIGNIFICANT CHANGE UP (ref 0.88–1.16)
LYMPHOCYTES # BLD AUTO: 0.63 K/UL — LOW (ref 1–3.3)
LYMPHOCYTES # BLD AUTO: 0.69 K/UL — LOW (ref 1–3.3)
LYMPHOCYTES # BLD AUTO: 15.3 % — SIGNIFICANT CHANGE UP (ref 13–44)
LYMPHOCYTES # BLD AUTO: 15.7 % — SIGNIFICANT CHANGE UP (ref 13–44)
MAGNESIUM SERPL-MCNC: 1.7 MG/DL — SIGNIFICANT CHANGE UP (ref 1.6–2.6)
MAGNESIUM SERPL-MCNC: 1.9 MG/DL — SIGNIFICANT CHANGE UP (ref 1.6–2.6)
MCHC RBC-ENTMCNC: 30.6 PG — SIGNIFICANT CHANGE UP (ref 27–34)
MCHC RBC-ENTMCNC: 31.4 PG — SIGNIFICANT CHANGE UP (ref 27–34)
MCHC RBC-ENTMCNC: 31.6 PG — SIGNIFICANT CHANGE UP (ref 27–34)
MCHC RBC-ENTMCNC: 33.5 GM/DL — SIGNIFICANT CHANGE UP (ref 32–36)
MCHC RBC-ENTMCNC: 34.4 GM/DL — SIGNIFICANT CHANGE UP (ref 32–36)
MCHC RBC-ENTMCNC: 34.5 GM/DL — SIGNIFICANT CHANGE UP (ref 32–36)
MCV RBC AUTO: 91.2 FL — SIGNIFICANT CHANGE UP (ref 80–100)
MCV RBC AUTO: 91.5 FL — SIGNIFICANT CHANGE UP (ref 80–100)
MCV RBC AUTO: 91.5 FL — SIGNIFICANT CHANGE UP (ref 80–100)
MONOCYTES # BLD AUTO: 0.29 K/UL — SIGNIFICANT CHANGE UP (ref 0–0.9)
MONOCYTES # BLD AUTO: 0.49 K/UL — SIGNIFICANT CHANGE UP (ref 0–0.9)
MONOCYTES NFR BLD AUTO: 11.2 % — SIGNIFICANT CHANGE UP (ref 2–14)
MONOCYTES NFR BLD AUTO: 7 % — SIGNIFICANT CHANGE UP (ref 2–14)
NEUTROPHILS # BLD AUTO: 2.93 K/UL — SIGNIFICANT CHANGE UP (ref 1.8–7.4)
NEUTROPHILS # BLD AUTO: 2.99 K/UL — SIGNIFICANT CHANGE UP (ref 1.8–7.4)
NEUTROPHILS NFR BLD AUTO: 68 % — SIGNIFICANT CHANGE UP (ref 43–77)
NEUTROPHILS NFR BLD AUTO: 71.2 % — SIGNIFICANT CHANGE UP (ref 43–77)
NRBC # BLD: 0 /100 WBCS — SIGNIFICANT CHANGE UP (ref 0–0)
NT-PROBNP SERPL-SCNC: 405 PG/ML — HIGH (ref 0–300)
PHOSPHATE SERPL-MCNC: 4.2 MG/DL — SIGNIFICANT CHANGE UP (ref 2.5–4.5)
PHOSPHATE SERPL-MCNC: 4.3 MG/DL — SIGNIFICANT CHANGE UP (ref 2.5–4.5)
PLATELET # BLD AUTO: 146 K/UL — LOW (ref 150–400)
PLATELET # BLD AUTO: 155 K/UL — SIGNIFICANT CHANGE UP (ref 150–400)
PLATELET # BLD AUTO: 188 K/UL — SIGNIFICANT CHANGE UP (ref 150–400)
POTASSIUM SERPL-MCNC: 4.3 MMOL/L — SIGNIFICANT CHANGE UP (ref 3.5–5.3)
POTASSIUM SERPL-MCNC: 4.3 MMOL/L — SIGNIFICANT CHANGE UP (ref 3.5–5.3)
POTASSIUM SERPL-MCNC: 4.9 MMOL/L — SIGNIFICANT CHANGE UP (ref 3.5–5.3)
POTASSIUM SERPL-SCNC: 4.3 MMOL/L — SIGNIFICANT CHANGE UP (ref 3.5–5.3)
POTASSIUM SERPL-SCNC: 4.3 MMOL/L — SIGNIFICANT CHANGE UP (ref 3.5–5.3)
POTASSIUM SERPL-SCNC: 4.9 MMOL/L — SIGNIFICANT CHANGE UP (ref 3.5–5.3)
PROT SERPL-MCNC: 5.8 G/DL — LOW (ref 6–8.3)
PROT SERPL-MCNC: 7.3 G/DL — SIGNIFICANT CHANGE UP (ref 6–8.3)
PROTHROM AB SERPL-ACNC: 12.6 SEC — SIGNIFICANT CHANGE UP (ref 10.5–13.4)
PROTHROM AB SERPL-ACNC: 13.6 SEC — HIGH (ref 10.5–13.4)
PROTHROM AB SERPL-ACNC: 13.7 SEC — HIGH (ref 10.5–13.4)
RBC # BLD: 3.42 M/UL — LOW (ref 4.2–5.8)
RBC # BLD: 3.63 M/UL — LOW (ref 4.2–5.8)
RBC # BLD: 3.88 M/UL — LOW (ref 4.2–5.8)
RBC # FLD: 13.9 % — SIGNIFICANT CHANGE UP (ref 10.3–14.5)
RH IG SCN BLD-IMP: POSITIVE — SIGNIFICANT CHANGE UP
SODIUM SERPL-SCNC: 137 MMOL/L — SIGNIFICANT CHANGE UP (ref 135–145)
SODIUM SERPL-SCNC: 138 MMOL/L — SIGNIFICANT CHANGE UP (ref 135–145)
SODIUM SERPL-SCNC: 139 MMOL/L — SIGNIFICANT CHANGE UP (ref 135–145)
WBC # BLD: 4.12 K/UL — SIGNIFICANT CHANGE UP (ref 3.8–10.5)
WBC # BLD: 4.39 K/UL — SIGNIFICANT CHANGE UP (ref 3.8–10.5)
WBC # BLD: 5.23 K/UL — SIGNIFICANT CHANGE UP (ref 3.8–10.5)
WBC # FLD AUTO: 4.12 K/UL — SIGNIFICANT CHANGE UP (ref 3.8–10.5)
WBC # FLD AUTO: 4.39 K/UL — SIGNIFICANT CHANGE UP (ref 3.8–10.5)
WBC # FLD AUTO: 5.23 K/UL — SIGNIFICANT CHANGE UP (ref 3.8–10.5)

## 2022-05-17 PROCEDURE — MCOT1: CPT | Mod: NC

## 2022-05-17 PROCEDURE — 33361 REPLACE AORTIC VALVE PERQ: CPT | Mod: 62,Q0,78

## 2022-05-17 PROCEDURE — 71045 X-RAY EXAM CHEST 1 VIEW: CPT | Mod: 26

## 2022-05-17 PROCEDURE — 93010 ELECTROCARDIOGRAM REPORT: CPT

## 2022-05-17 DEVICE — BLLN TRUE DIALATION 22MMX4.5CM: Type: IMPLANTABLE DEVICE | Status: FUNCTIONAL

## 2022-05-17 DEVICE — GWIRE SUPRACORE .035X370: Type: IMPLANTABLE DEVICE | Status: FUNCTIONAL

## 2022-05-17 DEVICE — INTRO MICROPUNC 4FRX10CM SS: Type: IMPLANTABLE DEVICE | Status: FUNCTIONAL

## 2022-05-17 DEVICE — GUIDEWIRE AMPLATZ SUPER-STIFF STRAIGHT .038" X 260CM: Type: IMPLANTABLE DEVICE | Status: FUNCTIONAL

## 2022-05-17 DEVICE — SHEATH INTRODUCER TERUMO PINNACLE CORONARY 8FR X 10CM X 0.038" MINI WIRE: Type: IMPLANTABLE DEVICE | Status: FUNCTIONAL

## 2022-05-17 DEVICE — GUIDEWIRE AMPLATZ EXTRA-STIFF CURVED .035" X 260CM: Type: IMPLANTABLE DEVICE | Status: FUNCTIONAL

## 2022-05-17 DEVICE — SHEATH INTRODUCER TERUMO PINNACLE CORONARY 5FR X 10CM X 0.038" MINI WIRE: Type: IMPLANTABLE DEVICE | Status: FUNCTIONAL

## 2022-05-17 DEVICE — WIRE GD CONFIDA BRECKER CRV .035X260: Type: IMPLANTABLE DEVICE | Status: FUNCTIONAL

## 2022-05-17 DEVICE — PACING CATH PACEL RIGHT HEART CURVE 5FR KIT: Type: IMPLANTABLE DEVICE | Status: FUNCTIONAL

## 2022-05-17 DEVICE — VLV HEART SAPIEN 3 W COMMANDER SYS 29MM: Type: IMPLANTABLE DEVICE | Status: FUNCTIONAL

## 2022-05-17 DEVICE — GUIDEWIRE TERUMO GLIDEWIRE STIFF STRAGHT .035" X 180CM: Type: IMPLANTABLE DEVICE | Status: FUNCTIONAL

## 2022-05-17 DEVICE — INTRODUCER SHEATH KIT GLIDESHEATH SLENDER FLEX STRAIGHT 21G 6F X 10CM: Type: IMPLANTABLE DEVICE | Status: FUNCTIONAL

## 2022-05-17 DEVICE — SYS CEREBRAL PROT NCT04149535 SENTINEL FOR STUDY ONLY: Type: IMPLANTABLE DEVICE | Status: FUNCTIONAL

## 2022-05-17 DEVICE — GUIDEWIRE GLIDEWIRE ANGLED TIP 0.035" X 180CM STIFF: Type: IMPLANTABLE DEVICE | Status: FUNCTIONAL

## 2022-05-17 DEVICE — SET INTRO MICROPUNCT STIFF ECHOTIP 4FX10CM: Type: IMPLANTABLE DEVICE | Status: FUNCTIONAL

## 2022-05-17 DEVICE — CATH DX PIG 145 INFIN 5FRX110CM: Type: IMPLANTABLE DEVICE | Status: FUNCTIONAL

## 2022-05-17 DEVICE — GWIRE GUID  0.035INX150CM: Type: IMPLANTABLE DEVICE | Status: FUNCTIONAL

## 2022-05-17 DEVICE — WIRE ASAHI GRAND SLAM 300CM: Type: IMPLANTABLE DEVICE | Status: FUNCTIONAL

## 2022-05-17 DEVICE — SHEATH INTRODUCER TERUMO PINNACLE CORONARY 6FR X 25CM: Type: IMPLANTABLE DEVICE | Status: FUNCTIONAL

## 2022-05-17 DEVICE — KIT PACE ELCTR BIPOLAR 5FR: Type: IMPLANTABLE DEVICE | Status: FUNCTIONAL

## 2022-05-17 DEVICE — CATH DX AL1 INFIN 5FRX100CM: Type: IMPLANTABLE DEVICE | Status: FUNCTIONAL

## 2022-05-17 DEVICE — GUIDEWIRE STANDARD STRAIGHT .035" X 180CM: Type: IMPLANTABLE DEVICE | Status: FUNCTIONAL

## 2022-05-17 DEVICE — SUT PERCLOSE PROGLIDE 6FR: Type: IMPLANTABLE DEVICE | Status: FUNCTIONAL

## 2022-05-17 RX ORDER — MAGNESIUM SULFATE 500 MG/ML
2 VIAL (ML) INJECTION ONCE
Refills: 0 | Status: COMPLETED | OUTPATIENT
Start: 2022-05-17 | End: 2022-05-17

## 2022-05-17 RX ORDER — GLUCAGON INJECTION, SOLUTION 0.5 MG/.1ML
1 INJECTION, SOLUTION SUBCUTANEOUS ONCE
Refills: 0 | Status: DISCONTINUED | OUTPATIENT
Start: 2022-05-17 | End: 2022-05-19

## 2022-05-17 RX ORDER — SODIUM CHLORIDE 9 MG/ML
1000 INJECTION, SOLUTION INTRAVENOUS
Refills: 0 | Status: DISCONTINUED | OUTPATIENT
Start: 2022-05-17 | End: 2022-05-19

## 2022-05-17 RX ORDER — CEFAZOLIN SODIUM 1 G
2000 VIAL (EA) INJECTION EVERY 8 HOURS
Refills: 0 | Status: COMPLETED | OUTPATIENT
Start: 2022-05-17 | End: 2022-05-19

## 2022-05-17 RX ORDER — DEXTROSE 50 % IN WATER 50 %
25 SYRINGE (ML) INTRAVENOUS ONCE
Refills: 0 | Status: DISCONTINUED | OUTPATIENT
Start: 2022-05-17 | End: 2022-05-19

## 2022-05-17 RX ORDER — CLOPIDOGREL BISULFATE 75 MG/1
75 TABLET, FILM COATED ORAL DAILY
Refills: 0 | Status: DISCONTINUED | OUTPATIENT
Start: 2022-05-18 | End: 2022-05-19

## 2022-05-17 RX ORDER — MIRTAZAPINE 45 MG/1
15 TABLET, ORALLY DISINTEGRATING ORAL AT BEDTIME
Refills: 0 | Status: DISCONTINUED | OUTPATIENT
Start: 2022-05-17 | End: 2022-05-19

## 2022-05-17 RX ORDER — ASPIRIN/CALCIUM CARB/MAGNESIUM 324 MG
81 TABLET ORAL DAILY
Refills: 0 | Status: DISCONTINUED | OUTPATIENT
Start: 2022-05-18 | End: 2022-05-19

## 2022-05-17 RX ORDER — TAMSULOSIN HYDROCHLORIDE 0.4 MG/1
0.4 CAPSULE ORAL AT BEDTIME
Refills: 0 | Status: DISCONTINUED | OUTPATIENT
Start: 2022-05-17 | End: 2022-05-19

## 2022-05-17 RX ORDER — MIRTAZAPINE 45 MG/1
1 TABLET, ORALLY DISINTEGRATING ORAL
Qty: 0 | Refills: 0 | DISCHARGE

## 2022-05-17 RX ORDER — CHLORHEXIDINE GLUCONATE 213 G/1000ML
1 SOLUTION TOPICAL DAILY
Refills: 0 | Status: DISCONTINUED | OUTPATIENT
Start: 2022-05-18 | End: 2022-05-18

## 2022-05-17 RX ORDER — ESCITALOPRAM OXALATE 10 MG/1
1 TABLET, FILM COATED ORAL
Qty: 0 | Refills: 0 | DISCHARGE

## 2022-05-17 RX ORDER — DEXTROAMPHETAMINE SACCHARATE, AMPHETAMINE ASPARTATE, DEXTROAMPHETAMINE SULFATE AND AMPHETAMINE SULFATE 1.875; 1.875; 1.875; 1.875 MG/1; MG/1; MG/1; MG/1
0 TABLET ORAL
Qty: 0 | Refills: 0 | DISCHARGE

## 2022-05-17 RX ORDER — SODIUM CHLORIDE 9 MG/ML
1000 INJECTION INTRAMUSCULAR; INTRAVENOUS; SUBCUTANEOUS
Refills: 0 | Status: DISCONTINUED | OUTPATIENT
Start: 2022-05-17 | End: 2022-05-19

## 2022-05-17 RX ORDER — ESCITALOPRAM OXALATE 10 MG/1
10 TABLET, FILM COATED ORAL DAILY
Refills: 0 | Status: DISCONTINUED | OUTPATIENT
Start: 2022-05-17 | End: 2022-05-19

## 2022-05-17 RX ORDER — DEXTROSE 50 % IN WATER 50 %
15 SYRINGE (ML) INTRAVENOUS ONCE
Refills: 0 | Status: DISCONTINUED | OUTPATIENT
Start: 2022-05-17 | End: 2022-05-19

## 2022-05-17 RX ORDER — MEPERIDINE HYDROCHLORIDE 50 MG/ML
25 INJECTION INTRAMUSCULAR; INTRAVENOUS; SUBCUTANEOUS ONCE
Refills: 0 | Status: DISCONTINUED | OUTPATIENT
Start: 2022-05-17 | End: 2022-05-18

## 2022-05-17 RX ORDER — CHLORHEXIDINE GLUCONATE 213 G/1000ML
5 SOLUTION TOPICAL
Refills: 0 | Status: DISCONTINUED | OUTPATIENT
Start: 2022-05-17 | End: 2022-05-18

## 2022-05-17 RX ORDER — PANTOPRAZOLE SODIUM 20 MG/1
40 TABLET, DELAYED RELEASE ORAL DAILY
Refills: 0 | Status: DISCONTINUED | OUTPATIENT
Start: 2022-05-17 | End: 2022-05-18

## 2022-05-17 RX ORDER — INSULIN LISPRO 100/ML
VIAL (ML) SUBCUTANEOUS
Refills: 0 | Status: DISCONTINUED | OUTPATIENT
Start: 2022-05-17 | End: 2022-05-19

## 2022-05-17 RX ORDER — ATORVASTATIN CALCIUM 80 MG/1
80 TABLET, FILM COATED ORAL AT BEDTIME
Refills: 0 | Status: DISCONTINUED | OUTPATIENT
Start: 2022-05-17 | End: 2022-05-19

## 2022-05-17 RX ORDER — DEXTROSE 50 % IN WATER 50 %
12.5 SYRINGE (ML) INTRAVENOUS ONCE
Refills: 0 | Status: DISCONTINUED | OUTPATIENT
Start: 2022-05-17 | End: 2022-05-19

## 2022-05-17 RX ORDER — ACETAMINOPHEN 500 MG
650 TABLET ORAL EVERY 6 HOURS
Refills: 0 | Status: DISCONTINUED | OUTPATIENT
Start: 2022-05-17 | End: 2022-05-19

## 2022-05-17 RX ORDER — METFORMIN HYDROCHLORIDE 850 MG/1
1 TABLET ORAL
Qty: 0 | Refills: 0 | DISCHARGE

## 2022-05-17 RX ORDER — CLOPIDOGREL BISULFATE 75 MG/1
75 TABLET, FILM COATED ORAL ONCE
Refills: 0 | Status: COMPLETED | OUTPATIENT
Start: 2022-05-17 | End: 2022-05-17

## 2022-05-17 RX ORDER — DULOXETINE HYDROCHLORIDE 30 MG/1
20 CAPSULE, DELAYED RELEASE ORAL DAILY
Refills: 0 | Status: DISCONTINUED | OUTPATIENT
Start: 2022-05-17 | End: 2022-05-19

## 2022-05-17 RX ORDER — HEPARIN SODIUM 5000 [USP'U]/ML
5000 INJECTION INTRAVENOUS; SUBCUTANEOUS EVERY 8 HOURS
Refills: 0 | Status: DISCONTINUED | OUTPATIENT
Start: 2022-05-17 | End: 2022-05-19

## 2022-05-17 RX ADMIN — MIRTAZAPINE 15 MILLIGRAM(S): 45 TABLET, ORALLY DISINTEGRATING ORAL at 21:54

## 2022-05-17 RX ADMIN — Medication 2: at 23:03

## 2022-05-17 RX ADMIN — Medication 650 MILLIGRAM(S): at 20:30

## 2022-05-17 RX ADMIN — CLOPIDOGREL BISULFATE 75 MILLIGRAM(S): 75 TABLET, FILM COATED ORAL at 10:17

## 2022-05-17 RX ADMIN — CHLORHEXIDINE GLUCONATE 5 MILLILITER(S): 213 SOLUTION TOPICAL at 16:59

## 2022-05-17 RX ADMIN — Medication 650 MILLIGRAM(S): at 19:45

## 2022-05-17 RX ADMIN — ATORVASTATIN CALCIUM 80 MILLIGRAM(S): 80 TABLET, FILM COATED ORAL at 21:55

## 2022-05-17 RX ADMIN — HEPARIN SODIUM 5000 UNIT(S): 5000 INJECTION INTRAVENOUS; SUBCUTANEOUS at 16:34

## 2022-05-17 RX ADMIN — HEPARIN SODIUM 5000 UNIT(S): 5000 INJECTION INTRAVENOUS; SUBCUTANEOUS at 21:54

## 2022-05-17 RX ADMIN — Medication 25 GRAM(S): at 17:00

## 2022-05-17 RX ADMIN — TAMSULOSIN HYDROCHLORIDE 0.4 MILLIGRAM(S): 0.4 CAPSULE ORAL at 21:54

## 2022-05-17 RX ADMIN — Medication 100 MILLIGRAM(S): at 16:59

## 2022-05-17 RX ADMIN — PANTOPRAZOLE SODIUM 40 MILLIGRAM(S): 20 TABLET, DELAYED RELEASE ORAL at 16:35

## 2022-05-17 NOTE — H&P ADULT - HISTORY OF PRESENT ILLNESS
This 76 year old male with PMH significant for HTN, HLD, NIDDM, coronary artery disease with PCI,                                                                   This 76 year old male with PMH significant for HTN, HLD, NIDDM, coronary artery disease with PCI, spinal ataxia (walker), prostate cancer with radiation (in remission), LYNDA on CPAP, chronic arthritis with neck and back pain, chronic diastolic heart failure, and severe aortic stenosis with s/p BAV and PCI on 3/4/22 (procedure was complicated by pseudoaneurysm of left ICA without dissection or active bleeding and acute cerebellar microemboli presented to this admission for TAVR intervention.  Patient presented initially to [facility] due to syncopal episode resulting in facial trauma because patient missed the chair while trying to sit down for dizziness.  Patient has a benign workup for syncopes.  In addition, his cardiac work up has revealed severe aortic stenosis.  Off note, patient's procedures of BAV resulted with expanding hematoma; vascular was consulted intraoperatively, there was pseudoaneurysm of left ICA without dissection appreciated through angiogram.  Patient has a prolong hospitalization and was eventually discharge to rehab for further care.  At this point of care, patient reports he is back to his baseline with no complains.  He denies chest pain, shortness of breath, syncopes or worsening LE edema.      Upon arrival, patient is hemodynamically stable.  He is awake and alert and denies chest pain or shortness of breath.  He states ambulates with a rolling walker or cane.

## 2022-05-17 NOTE — BRIEF OPERATIVE NOTE - COMMENTS
Dr. Tirado was the first assistant for this case including but not limited to TAVR    I was present for this procedure and participated as first assistant as described by the PA above, unless otherwise noted below.

## 2022-05-17 NOTE — H&P ADULT - NSICDXPASTMEDICALHX_GEN_ALL_CORE_FT
PAST MEDICAL HISTORY:  Arthritis     Ataxia, family, spinal     CAD (coronary artery disease)     Diabetes mellitus     History of aortic stenosis     HTN (hypertension)     Hyperlipidemia     LYNDA on CPAP

## 2022-05-17 NOTE — H&P ADULT - NSHPSOCIALHISTORY_GEN_ALL_CORE
Smoker: no  ETOH use:  no  [employer] Drug use: no  Occupation: non contributory   Assisted device use (Cane / Walker): n/a  Live with: family

## 2022-05-17 NOTE — H&P ADULT - NSHPPHYSICALEXAM_GEN_ALL_CORE
T(C): 36.7 (16 May 2022 11:55), Max: 36.7 (16 May 2022 11:55)  T(F): 98 (16 May 2022 11:55), Max: 98 (16 May 2022 11:55)  HR: 95 (16 May 2022 11:55) (95 - 95)  BP: 146/68 (16 May 2022 11:55) (146/68 - 146/68)  BP(mean): --  RR: 16 (16 May 2022 11:55) (16 - 16)  SpO2: 97% (16 May 2022 11:55) (97% - 97%)    CONSTITUTIONAL:                                                                            NEURO:                                                                                                                   EYES:                                                                                                    ENMT:                                                                                                  CV:                                                                                                        RESPIRATORY:                                                                                    GI:                                                                                                         : FREIRE + / -                                                                                   MUSKULOSKELETAL:                                                                         SKIN / BREAST: T(C): 36.7 (16 May 2022 11:55), Max: 36.7 (16 May 2022 11:55)  T(F): 98 (16 May 2022 11:55), Max: 98 (16 May 2022 11:55)  HR: 95 (16 May 2022 11:55) (95 - 95)  BP: 146/68 (16 May 2022 11:55) (146/68 - 146/68)  BP(mean): --  RR: 16 (16 May 2022 11:55) (16 - 16)  SpO2: 97% (16 May 2022 11:55) (97% - 97%)    CONSTITUTIONAL:    NAD slow ambulation                                                                         NEURO:       grossly intact                                                                                                             EYES:      PERRL                                                                                             ENMT:       support and no lymphadenopathy                                                                                            CV:        RRR without murmur                                                                                                 RESPIRATORY:          no wheezing and non rhonchi                                                                           GI:      BM and BS are intact                                                                                                    : deferred                                                                                 MUSKULOSKELETAL:    no deformity                                                                      SKIN / BREAST:  warm

## 2022-05-17 NOTE — H&P ADULT - NSHPREVIEWOFSYSTEMS_GEN_ALL_CORE
CONSTITUTIONAL:  Fevers / chills, sweats, fatigue, weight loss, weight gain                                     NEURO:  parathesias, seizures, syncope, confusion                                                                                 EYES:  Blurry vision, discharge, pain, loss of vision                                                                               ENMT:  Difficulty hearing, vertigo, dysphagia, epistaxis, recent dental work                                      CV:  Chest pain, palpitations, HEIN, orthopnea                                                                                         RESPIRATORY:  Wheezing, SOB, cough / sputum, hemoptysis                                                               GI:  Nausea, vommiting, diarrhea, constipation, melena                                                                         : Hematuria, dysuria, urgency, incontinence                                                                                        MUSKULOSKELETAL:  arthritis, joint swelling, muscle weakness                                                           SKIN/BREAST:  rash, itching, yevgeniy loss, masses                                                                                              PSYCH:  depresion, anxiety, suicidal ideation                                                                                               HEME/LYMPH:  bruises easily, enlarged lymph nodes, tender lymph nodes                                          ENDOCRINE:  cold intolerance, heat intolerance, polydipsia CONSTITUTIONAL: denies Fevers / chills, sweats, fatigue, weight loss, weight gain                                     NEURO: denies parathesias, seizures, syncope, confusion                                                                                 EYES: denies Blurry vision, discharge, pain, loss of vision                                                                               ENMT: denies Difficulty hearing, vertigo, dysphagia, epistaxis, recent dental work                                      CV: denies Chest pain, palpitations, EHIN, orthopnea                                                                                         RESPIRATORY: denies Wheezing, SOB, cough / sputum, hemoptysis                                                               GI: denies Nausea, vomiting, diarrhea, constipation, melena                                                                         : denies Hematuria, dysuria, urgency, incontinence                                                                                        MUSKULOSKELETAL: denies arthritis, joint swelling, muscle weakness                                                           SKIN/BREAST: denies rash, itching, hair loss, masses                                                                                              PSYCH: denies depression, anxiety, suicidal ideation                                                                                               HEME/LYMPH:  denies bruises easily, enlarged lymph nodes, tender lymph nodes                                          ENDOCRINE: denies cold intolerance, heat intolerance, polydipsia

## 2022-05-17 NOTE — PROGRESS NOTE ADULT - SUBJECTIVE AND OBJECTIVE BOX
Patient discussed on morning rounds with Dr. Tirado    Operation / Date: 5/17 TAVR (Bhavya valve), EF Nl    SUBJECTIVE ASSESSMENT:  76y Male assessed at bedside post TAVR. Patient fatigued after         Vital Signs Last 24 Hrs  T(C): 36.1 (17 May 2022 14:02), Max: 36.1 (17 May 2022 14:02)  T(F): 97 (17 May 2022 14:02), Max: 97 (17 May 2022 14:02)  HR: 79 (17 May 2022 15:00) (66 - 79)  BP: --  BP(mean): --  RR: 15 (17 May 2022 15:00) (15 - 18)  SpO2: 95% (17 May 2022 15:00) (95% - 100%)  I&O's Detail    17 May 2022 07:01  -  17 May 2022 15:15  --------------------------------------------------------  IN:    sodium chloride 0.9%: 150 mL  Total IN: 150 mL    OUT:  Total OUT: 0 mL    Total NET: 150 mL          CHEST TUBE:  Yes/No. AIR LEAKS: Yes/No. Suction / H2O SEAL.   LIVE DRAIN:  Yes/No.  EPICARDIAL WIRES: Yes/No.  TIE DOWNS: Yes/No.  FREIRE: Yes/No.    PHYSICAL EXAM:    General:     Neurological:    Cardiovascular:    Respiratory:    Gastrointestinal:    Extremities:    Vascular:    Incision Sites:    LABS:                        10.8   4.12  )-----------( 155      ( 17 May 2022 13:59 )             31.3       COUMADIN:  Yes/No. REASON: .    PT/INR - ( 17 May 2022 13:59 )   PT: 13.6 sec;   INR: 1.14          PTT - ( 17 May 2022 13:59 )  PTT:31.8 sec    05-17    138  |  105  |  24<H>  ----------------------------<  131<H>  4.3   |  25  |  0.88    Ca    9.0      17 May 2022 13:58  Phos  4.2     05-17  Mg     1.7     05-17    TPro  5.8<L>  /  Alb  3.7  /  TBili  0.5  /  DBili  x   /  AST  19  /  ALT  16  /  AlkPhos  62  05-17          MEDICATIONS  (STANDING):  atorvastatin 80 milliGRAM(s) Oral at bedtime  ceFAZolin   IVPB 2000 milliGRAM(s) IV Intermittent every 8 hours  chlorhexidine 0.12% Liquid 5 milliLiter(s) Oral Mucosa two times a day  dextrose 5%. 1000 milliLiter(s) (50 mL/Hr) IV Continuous <Continuous>  dextrose 5%. 1000 milliLiter(s) (100 mL/Hr) IV Continuous <Continuous>  dextrose 50% Injectable 25 Gram(s) IV Push once  dextrose 50% Injectable 12.5 Gram(s) IV Push once  dextrose 50% Injectable 25 Gram(s) IV Push once  DULoxetine 20 milliGRAM(s) Oral daily  escitalopram 10 milliGRAM(s) Oral daily  glucagon  Injectable 1 milliGRAM(s) IntraMuscular once  heparin   Injectable 5000 Unit(s) SubCutaneous every 8 hours  insulin lispro (ADMELOG) corrective regimen sliding scale   SubCutaneous Before meals and at bedtime  meperidine     Injectable 25 milliGRAM(s) IV Push once  mirtazapine 15 milliGRAM(s) Oral at bedtime  pantoprazole  Injectable 40 milliGRAM(s) IV Push daily  sodium chloride 0.9%. 1000 milliLiter(s) (10 mL/Hr) IV Continuous <Continuous>  tamsulosin 0.4 milliGRAM(s) Oral at bedtime    MEDICATIONS  (PRN):  dextrose Oral Gel 15 Gram(s) Oral once PRN Blood Glucose LESS THAN 70 milliGRAM(s)/deciliter        RADIOLOGY & ADDITIONAL TESTS:     Patient discussed on morning rounds with Dr. Tirado    Operation / Date: 5/17 TAVR (Bhavya valve), EF Nl    SUBJECTIVE ASSESSMENT:  76y Male assessed at bedside post TAVR. Patient fatigued after surgery but responds appropriately to commands. Denies chest pain, fever, chills, nausea, vomiting.     Vital Signs Last 24 Hrs  T(C): 36.1 (17 May 2022 14:02), Max: 36.1 (17 May 2022 14:02)  T(F): 97 (17 May 2022 14:02), Max: 97 (17 May 2022 14:02)  HR: 79 (17 May 2022 15:00) (66 - 79)  BP: --  BP(mean): --  RR: 15 (17 May 2022 15:00) (15 - 18)  SpO2: 95% (17 May 2022 15:00) (95% - 100%)  I&O's Detail    17 May 2022 07:01  -  17 May 2022 15:15  --------------------------------------------------------  IN:    sodium chloride 0.9%: 150 mL  Total IN: 150 mL    OUT:  Total OUT: 0 mL    Total NET: 150 mL    CHEST TUBE:  No  LIVE DRAIN:  No.  EPICARDIAL WIRES: No.  TIE DOWNS: No.  FREIRE: No.    Physical Exam  CONSTITUTIONAL: Well appearing in NAD assessed laying comfortably in bed   NEURO: A&OX3. No focal deficits noted, moving bilateral upper and lower extremities                    CV: RRR, no murmurs, rubs, gallops  RESPIRATORY: Clear to auscultation bilateral posterior lung fields, no wheezes, rales, rhonchi   GI: +BS, NT/ND  MUSKULOSKELETAL: No peripheral edema or calf tenderness. Moving bilateral upper and lower extremities.   VASCULAR: Bilateral distal pulses 2+  INCISIONS: Bilateral groins soft, no hematoma. Right radial TR band in place     LABS:                        10.8   4.12  )-----------( 155      ( 17 May 2022 13:59 )             31.3       COUMADIN: No    PT/INR - ( 17 May 2022 13:59 )   PT: 13.6 sec;   INR: 1.14          PTT - ( 17 May 2022 13:59 )  PTT:31.8 sec    05-17    138  |  105  |  24<H>  ----------------------------<  131<H>  4.3   |  25  |  0.88    Ca    9.0      17 May 2022 13:58  Phos  4.2     05-17  Mg     1.7     05-17    TPro  5.8<L>  /  Alb  3.7  /  TBili  0.5  /  DBili  x   /  AST  19  /  ALT  16  /  AlkPhos  62  05-17    MEDICATIONS  (STANDING):  atorvastatin 80 milliGRAM(s) Oral at bedtime  ceFAZolin   IVPB 2000 milliGRAM(s) IV Intermittent every 8 hours  chlorhexidine 0.12% Liquid 5 milliLiter(s) Oral Mucosa two times a day  dextrose 5%. 1000 milliLiter(s) (50 mL/Hr) IV Continuous <Continuous>  dextrose 5%. 1000 milliLiter(s) (100 mL/Hr) IV Continuous <Continuous>  dextrose 50% Injectable 25 Gram(s) IV Push once  dextrose 50% Injectable 12.5 Gram(s) IV Push once  dextrose 50% Injectable 25 Gram(s) IV Push once  DULoxetine 20 milliGRAM(s) Oral daily  escitalopram 10 milliGRAM(s) Oral daily  glucagon  Injectable 1 milliGRAM(s) IntraMuscular once  heparin   Injectable 5000 Unit(s) SubCutaneous every 8 hours  insulin lispro (ADMELOG) corrective regimen sliding scale   SubCutaneous Before meals and at bedtime  meperidine     Injectable 25 milliGRAM(s) IV Push once  mirtazapine 15 milliGRAM(s) Oral at bedtime  pantoprazole  Injectable 40 milliGRAM(s) IV Push daily  sodium chloride 0.9%. 1000 milliLiter(s) (10 mL/Hr) IV Continuous <Continuous>  tamsulosin 0.4 milliGRAM(s) Oral at bedtime    MEDICATIONS  (PRN):  dextrose Oral Gel 15 Gram(s) Oral once PRN Blood Glucose LESS THAN 70 milliGRAM(s)/deciliter        RADIOLOGY & ADDITIONAL TESTS:    < from: Xray Chest 1 View- PORTABLE-Routine (Xray Chest 1 View- PORTABLE-Routine in AM.) (03.09.22 @ 05:25) >  Findings/  impression: Right basilar focal atelectasis. Heart and mediastinum are   unremarkable. Stable bony structures.    < end of copied text >

## 2022-05-17 NOTE — H&P ADULT - ASSESSMENT
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  Patient presented initially to [facility] due to syncopal episode resulting in facial trauma because patient missed the chair while trying to sit down for dizziness.  Patient has a benign workup for syncopes.  In addition, his cardiac work up has revealed severe aortic stenosis.  Off note, patient's procedures of BAV resulted with expanding hematoma; vascular was consulted intraoperatively, there was pseudoaneurysm of left ICA without dissection appreciated through angiogram.  Patient has a prolong hospitalization and was eventually discharge to rehab for further care.  At this point of care, patient reports he is back to his baseline with no complains.  He denies chest pain, shortness of breath, syncopes or worsening LE edema.      Neurovascular: No delirium.   - periop neurologic check     Cardiovascular:   Aortic stenosis  - TAVR intervention at this admission   - type and screen   - EKG pre op   - labs pre op    Respiratory: 02 Sat = 98% on RA.  -If on oxygen wean to RA from for O2 Sat > 93%.  -Encourage C+DB and Use of IS 10x / hr while awake.  -CXR.    GI: Stable.  -NPO after MN.  -PPX.  -PO Diet.    Renal / : stable  - gentle hydration post procedure   - monitor UOP     Endocrine:  stable  - follow up with lab     Hematologic: H/H stable   - type and screen and hold blood product  - daily lab    ID: afebrile  - monitor lab and signs of infection    Prophylaxis:  -DVT prophylaxis with 5000 SubQ Heparin q8h.  -SCD's    Disposition:  - TABR intervention at this admission

## 2022-05-17 NOTE — H&P ADULT - NSHPLABSRESULTS_GEN_ALL_CORE
LABS:                        12.2   5.23  )-----------( 188      ( 17 May 2022 10:05 )             35.5     05-17    139  |  102  |  30<H>  ----------------------------<  122<H>  4.9   |  25  |  0.95    Ca    10.1      17 May 2022 10:05  Phos  4.3     05-17  Mg     1.9     05-17    TPro  7.3  /  Alb  4.7  /  TBili  0.6  /  DBili  x   /  AST  21  /  ALT  20  /  AlkPhos  67  05-17    PT/INR - ( 17 May 2022 10:05 )   PT: 12.6 sec;   INR: 1.06    PTT - ( 17 May 2022 10:05 )  PTT:31.8 sec    RADIOLOGY & ADDITIONAL STUDIES:  CAROTID U/S:    CXR:    CT Scan:    EKG:    TTE / VINOD:    Cardiac Cath: LABS:                        12.2   5.23  )-----------( 188      ( 17 May 2022 10:05 )             35.5     05-17    139  |  102  |  30<H>  ----------------------------<  122<H>  4.9   |  25  |  0.95    Ca    10.1      17 May 2022 10:05  Phos  4.3     05-17  Mg     1.9     05-17    TPro  7.3  /  Alb  4.7  /  TBili  0.6  /  DBili  x   /  AST  21  /  ALT  20  /  AlkPhos  67  05-17    PT/INR - ( 17 May 2022 10:05 )   PT: 12.6 sec;   INR: 1.06    PTT - ( 17 May 2022 10:05 )  PTT:31.8 sec    RADIOLOGY & ADDITIONAL STUDIES:  CAROTID U/S:  < from: US Duplex Carotid Arteries Complete, Bilateral (03.02.22 @ 11:30) >    IMPRESSION: No significant hemodynamic stenosis of either carotid artery.   No significant atherosclerotic plaque formation.    Measurement of carotid stenosis is based on velocity parameters that   correlate the residual internal carotid diameter with that of the more   distal vessel in accordance with a method such as the [place] American   Symptomatic Carotid Endarterectomy Trial (NASCET).    CXR:    CT Scan:  < from: CT Angio Abdomen and Pelvis w/ IV Cont (03.05.22 @ 10:56) >  1.  Status post TAVR with left groin access. Small filling defect within   the proximal left profunda likely representing an embolic clot. Small   subcutaneous hematoma. No pseudoaneurysm.  2.  Left medial thigh hematoma measuring up to 5 cm in maximum dimension.      EKG:    Ventricular Rate 54 BPM    Atrial Rate 54 BPM    P-R Interval 180 ms    QRS Duration 98 ms    Q-T Interval 468 ms    QTC Calculation(Bazett) 443 ms    P Axis 66 degrees    R Axis -6 degrees    T Axis 65 degrees    Diagnosis Line Sinus bradycardia  Nonspecific T wave abnormality    TTE / VINOD:  < from: TTE Echo Complete w/ Contrast w/ Doppler (03.05.22 @ 08:52) >     1. Moderate symmetric left ventricular hypertrophy.   2. Normal left ventricular size and systolic function.   3. Normal right ventricular size and systolic function.   4. Severe aortic stenosis. Peak transvalvular velocity is 4.66 m/s, the   mean transvalvular gradient is 59.00 mmHg, and the LVOT/AV velocity ratio   is0.23. The peak transaortic gradient is 86.86 mmHg. The aortic valve   area (estimated via the continuity method) is 0.73 cm².   5. Mild aortic regurgitation.   6. No other significant valvular disease.   7. Trivial pericardial effusion.    Cardiac Cath:  performed prior procedures

## 2022-05-17 NOTE — BRIEF OPERATIVE NOTE - NSICDXBRIEFPROCEDURE_GEN_ALL_CORE_FT
OP CM called to pt to check on psychosocial needs and waiver for pts hsb  Will continue to follow  PROCEDURES:  TAVR, percutaneous 17-May-2022 13:04:08  Milka Whittington

## 2022-05-18 LAB
ANION GAP SERPL CALC-SCNC: 10 MMOL/L — SIGNIFICANT CHANGE UP (ref 5–17)
ANION GAP SERPL CALC-SCNC: 8 MMOL/L — SIGNIFICANT CHANGE UP (ref 5–17)
APTT BLD: 29.2 SEC — SIGNIFICANT CHANGE UP (ref 27.5–35.5)
APTT BLD: 30 SEC — SIGNIFICANT CHANGE UP (ref 27.5–35.5)
BUN SERPL-MCNC: 26 MG/DL — HIGH (ref 7–23)
BUN SERPL-MCNC: 27 MG/DL — HIGH (ref 7–23)
CALCIUM SERPL-MCNC: 9.2 MG/DL — SIGNIFICANT CHANGE UP (ref 8.4–10.5)
CALCIUM SERPL-MCNC: 9.2 MG/DL — SIGNIFICANT CHANGE UP (ref 8.4–10.5)
CHLORIDE SERPL-SCNC: 104 MMOL/L — SIGNIFICANT CHANGE UP (ref 96–108)
CHLORIDE SERPL-SCNC: 105 MMOL/L — SIGNIFICANT CHANGE UP (ref 96–108)
CO2 SERPL-SCNC: 23 MMOL/L — SIGNIFICANT CHANGE UP (ref 22–31)
CO2 SERPL-SCNC: 24 MMOL/L — SIGNIFICANT CHANGE UP (ref 22–31)
CREAT SERPL-MCNC: 0.88 MG/DL — SIGNIFICANT CHANGE UP (ref 0.5–1.3)
CREAT SERPL-MCNC: 0.93 MG/DL — SIGNIFICANT CHANGE UP (ref 0.5–1.3)
EGFR: 85 ML/MIN/1.73M2 — SIGNIFICANT CHANGE UP
EGFR: 89 ML/MIN/1.73M2 — SIGNIFICANT CHANGE UP
GLUCOSE BLDC GLUCOMTR-MCNC: 134 MG/DL — HIGH (ref 70–99)
GLUCOSE BLDC GLUCOMTR-MCNC: 158 MG/DL — HIGH (ref 70–99)
GLUCOSE BLDC GLUCOMTR-MCNC: 185 MG/DL — HIGH (ref 70–99)
GLUCOSE BLDC GLUCOMTR-MCNC: 272 MG/DL — HIGH (ref 70–99)
GLUCOSE SERPL-MCNC: 107 MG/DL — HIGH (ref 70–99)
GLUCOSE SERPL-MCNC: 108 MG/DL — HIGH (ref 70–99)
HCT VFR BLD CALC: 31.7 % — LOW (ref 39–50)
HCT VFR BLD CALC: 34.7 % — LOW (ref 39–50)
HGB BLD-MCNC: 10.8 G/DL — LOW (ref 13–17)
HGB BLD-MCNC: 11.7 G/DL — LOW (ref 13–17)
INR BLD: 1.07 — SIGNIFICANT CHANGE UP (ref 0.88–1.16)
INR BLD: 1.13 — SIGNIFICANT CHANGE UP (ref 0.88–1.16)
MAGNESIUM SERPL-MCNC: 2.1 MG/DL — SIGNIFICANT CHANGE UP (ref 1.6–2.6)
MAGNESIUM SERPL-MCNC: 2.1 MG/DL — SIGNIFICANT CHANGE UP (ref 1.6–2.6)
MCHC RBC-ENTMCNC: 30.8 PG — SIGNIFICANT CHANGE UP (ref 27–34)
MCHC RBC-ENTMCNC: 31.4 PG — SIGNIFICANT CHANGE UP (ref 27–34)
MCHC RBC-ENTMCNC: 33.7 GM/DL — SIGNIFICANT CHANGE UP (ref 32–36)
MCHC RBC-ENTMCNC: 34.1 GM/DL — SIGNIFICANT CHANGE UP (ref 32–36)
MCV RBC AUTO: 90.3 FL — SIGNIFICANT CHANGE UP (ref 80–100)
MCV RBC AUTO: 93 FL — SIGNIFICANT CHANGE UP (ref 80–100)
NRBC # BLD: 0 /100 WBCS — SIGNIFICANT CHANGE UP (ref 0–0)
NRBC # BLD: 0 /100 WBCS — SIGNIFICANT CHANGE UP (ref 0–0)
PLATELET # BLD AUTO: 140 K/UL — LOW (ref 150–400)
PLATELET # BLD AUTO: 148 K/UL — LOW (ref 150–400)
POTASSIUM SERPL-MCNC: 4.5 MMOL/L — SIGNIFICANT CHANGE UP (ref 3.5–5.3)
POTASSIUM SERPL-MCNC: 4.6 MMOL/L — SIGNIFICANT CHANGE UP (ref 3.5–5.3)
POTASSIUM SERPL-SCNC: 4.5 MMOL/L — SIGNIFICANT CHANGE UP (ref 3.5–5.3)
POTASSIUM SERPL-SCNC: 4.6 MMOL/L — SIGNIFICANT CHANGE UP (ref 3.5–5.3)
PROTHROM AB SERPL-ACNC: 12.8 SEC — SIGNIFICANT CHANGE UP (ref 10.5–13.4)
PROTHROM AB SERPL-ACNC: 13.5 SEC — HIGH (ref 10.5–13.4)
RBC # BLD: 3.51 M/UL — LOW (ref 4.2–5.8)
RBC # BLD: 3.73 M/UL — LOW (ref 4.2–5.8)
RBC # FLD: 13.9 % — SIGNIFICANT CHANGE UP (ref 10.3–14.5)
RBC # FLD: 13.9 % — SIGNIFICANT CHANGE UP (ref 10.3–14.5)
SODIUM SERPL-SCNC: 137 MMOL/L — SIGNIFICANT CHANGE UP (ref 135–145)
SODIUM SERPL-SCNC: 137 MMOL/L — SIGNIFICANT CHANGE UP (ref 135–145)
WBC # BLD: 4.79 K/UL — SIGNIFICANT CHANGE UP (ref 3.8–10.5)
WBC # BLD: 5.56 K/UL — SIGNIFICANT CHANGE UP (ref 3.8–10.5)
WBC # FLD AUTO: 4.79 K/UL — SIGNIFICANT CHANGE UP (ref 3.8–10.5)
WBC # FLD AUTO: 5.56 K/UL — SIGNIFICANT CHANGE UP (ref 3.8–10.5)

## 2022-05-18 PROCEDURE — 93306 TTE W/DOPPLER COMPLETE: CPT | Mod: 26

## 2022-05-18 PROCEDURE — 93308 TTE F-UP OR LMTD: CPT | Mod: 26

## 2022-05-18 PROCEDURE — 71045 X-RAY EXAM CHEST 1 VIEW: CPT | Mod: 26

## 2022-05-18 RX ORDER — SENNA PLUS 8.6 MG/1
2 TABLET ORAL AT BEDTIME
Refills: 0 | Status: DISCONTINUED | OUTPATIENT
Start: 2022-05-18 | End: 2022-05-19

## 2022-05-18 RX ORDER — POLYETHYLENE GLYCOL 3350 17 G/17G
17 POWDER, FOR SOLUTION ORAL DAILY
Refills: 0 | Status: DISCONTINUED | OUTPATIENT
Start: 2022-05-18 | End: 2022-05-19

## 2022-05-18 RX ORDER — LANOLIN ALCOHOL/MO/W.PET/CERES
5 CREAM (GRAM) TOPICAL AT BEDTIME
Refills: 0 | Status: DISCONTINUED | OUTPATIENT
Start: 2022-05-18 | End: 2022-05-19

## 2022-05-18 RX ORDER — PANTOPRAZOLE SODIUM 20 MG/1
40 TABLET, DELAYED RELEASE ORAL
Refills: 0 | Status: DISCONTINUED | OUTPATIENT
Start: 2022-05-18 | End: 2022-05-19

## 2022-05-18 RX ORDER — ACETAMINOPHEN 500 MG
1000 TABLET ORAL ONCE
Refills: 0 | Status: COMPLETED | OUTPATIENT
Start: 2022-05-18 | End: 2022-05-18

## 2022-05-18 RX ORDER — LIDOCAINE 4 G/100G
1 CREAM TOPICAL EVERY 24 HOURS
Refills: 0 | Status: DISCONTINUED | OUTPATIENT
Start: 2022-05-18 | End: 2022-05-19

## 2022-05-18 RX ORDER — SODIUM CHLORIDE 9 MG/ML
500 INJECTION, SOLUTION INTRAVENOUS ONCE
Refills: 0 | Status: COMPLETED | OUTPATIENT
Start: 2022-05-18 | End: 2022-05-18

## 2022-05-18 RX ORDER — SODIUM CHLORIDE 9 MG/ML
3 INJECTION INTRAMUSCULAR; INTRAVENOUS; SUBCUTANEOUS EVERY 8 HOURS
Refills: 0 | Status: DISCONTINUED | OUTPATIENT
Start: 2022-05-18 | End: 2022-05-19

## 2022-05-18 RX ADMIN — LIDOCAINE 1 PATCH: 4 CREAM TOPICAL at 13:32

## 2022-05-18 RX ADMIN — Medication 100 MILLIGRAM(S): at 17:47

## 2022-05-18 RX ADMIN — CLOPIDOGREL BISULFATE 75 MILLIGRAM(S): 75 TABLET, FILM COATED ORAL at 12:32

## 2022-05-18 RX ADMIN — Medication 1000 MILLIGRAM(S): at 16:00

## 2022-05-18 RX ADMIN — Medication 81 MILLIGRAM(S): at 12:09

## 2022-05-18 RX ADMIN — MIRTAZAPINE 15 MILLIGRAM(S): 45 TABLET, ORALLY DISINTEGRATING ORAL at 21:49

## 2022-05-18 RX ADMIN — Medication 400 MILLIGRAM(S): at 15:31

## 2022-05-18 RX ADMIN — Medication 100 MILLIGRAM(S): at 01:44

## 2022-05-18 RX ADMIN — HEPARIN SODIUM 5000 UNIT(S): 5000 INJECTION INTRAVENOUS; SUBCUTANEOUS at 07:17

## 2022-05-18 RX ADMIN — LIDOCAINE 1 PATCH: 4 CREAM TOPICAL at 18:52

## 2022-05-18 RX ADMIN — Medication 100 MILLIGRAM(S): at 10:09

## 2022-05-18 RX ADMIN — DULOXETINE HYDROCHLORIDE 20 MILLIGRAM(S): 30 CAPSULE, DELAYED RELEASE ORAL at 12:08

## 2022-05-18 RX ADMIN — Medication 5 MILLIGRAM(S): at 21:49

## 2022-05-18 RX ADMIN — HEPARIN SODIUM 5000 UNIT(S): 5000 INJECTION INTRAVENOUS; SUBCUTANEOUS at 21:48

## 2022-05-18 RX ADMIN — Medication 2: at 17:20

## 2022-05-18 RX ADMIN — TAMSULOSIN HYDROCHLORIDE 0.4 MILLIGRAM(S): 0.4 CAPSULE ORAL at 21:48

## 2022-05-18 RX ADMIN — ATORVASTATIN CALCIUM 80 MILLIGRAM(S): 80 TABLET, FILM COATED ORAL at 21:48

## 2022-05-18 RX ADMIN — HEPARIN SODIUM 5000 UNIT(S): 5000 INJECTION INTRAVENOUS; SUBCUTANEOUS at 13:32

## 2022-05-18 RX ADMIN — SODIUM CHLORIDE 3 MILLILITER(S): 9 INJECTION INTRAMUSCULAR; INTRAVENOUS; SUBCUTANEOUS at 21:50

## 2022-05-18 RX ADMIN — POLYETHYLENE GLYCOL 3350 17 GRAM(S): 17 POWDER, FOR SOLUTION ORAL at 12:08

## 2022-05-18 RX ADMIN — SODIUM CHLORIDE 1000 MILLILITER(S): 9 INJECTION, SOLUTION INTRAVENOUS at 05:45

## 2022-05-18 RX ADMIN — SODIUM CHLORIDE 3 MILLILITER(S): 9 INJECTION INTRAMUSCULAR; INTRAVENOUS; SUBCUTANEOUS at 13:29

## 2022-05-18 RX ADMIN — Medication 6: at 12:07

## 2022-05-18 RX ADMIN — ESCITALOPRAM OXALATE 10 MILLIGRAM(S): 10 TABLET, FILM COATED ORAL at 12:08

## 2022-05-18 RX ADMIN — Medication 2: at 21:47

## 2022-05-18 NOTE — PROGRESS NOTE ADULT - SUBJECTIVE AND OBJECTIVE BOX
Patient discussed on morning rounds with Dr. Tirado    Operation / Date: 5/17 TAVR (Bhavya valve), EF Nl    SUBJECTIVE ASSESSMENT:  76y Male assessed this morning at bedside. Patient states he feels overall okay, notes he felt a little weak this morning when he got out of bed and into the chair. Denies chest pain and SOB. [facility] No [facility] yet, endorses flatus.     Vital Signs Last 24 Hrs  T(C): 36.4 (18 May 2022 09:04), Max: 36.9 (18 May 2022 01:01)  T(F): 97.6 (18 May 2022 09:04), Max: 98.4 (18 May 2022 01:01)  HR: 90 (18 May 2022 11:06) (60 - 100)  BP: 122/60 (18 May 2022 11:06) (104/55 - 131/61)  BP(mean): 83 (18 May 2022 11:06) (75 - 95)  RR: 18 (18 May 2022 08:37) (15 - 18)  SpO2: 96% (18 May 2022 11:06) (91% - 100%)  I&O's Detail    17 May 2022 07:01  -  18 May 2022 07:00  --------------------------------------------------------  IN:    IV PiggyBack: 150 mL    Lactated Ringers Bolus: 500 mL    Oral Fluid: 400 mL    sodium chloride 0.9%: 240 mL  Total IN: 1290 mL    OUT:    Voided (mL): 1550 mL  Total OUT: 1550 mL    Total NET: -260 mL    18 May 2022 07:01  -  18 May 2022 11:25  --------------------------------------------------------  IN:  Total IN: 0 mL    OUT:    Voided (mL): 500 mL  Total OUT: 500 mL    Total NET: -500 mL    CHEST TUBE:  Yes/No. AIR LEAKS: Yes/No. Suction / H2O SEAL.   LIVE DRAIN:  Yes/No.  EPICARDIAL WIRES: Yes/No.  TIE DOWNS: Yes/No.  FREIRE: Yes/No.    PHYSICAL EXAM:    General:     Neurological:    Cardiovascular:    Respiratory:    Gastrointestinal:    Extremities:    Vascular:    Incision Sites:    LABS:                        11.7   5.56  )-----------( 148      ( 18 May 2022 05:31 )             34.7       COUMADIN:  Yes/No. REASON: .    PT/INR - ( 18 May 2022 05:31 )   PT: 12.8 sec;   INR: 1.07          PTT - ( 18 May 2022 05:31 )  PTT:29.2 sec    05-18    137  |  104  |  26<H>  ----------------------------<  108<H>  4.6   |  23  |  0.93    Ca    9.2      18 May 2022 05:31  Phos  4.2     05-17  Mg     2.1     05-18    TPro  5.8<L>  /  Alb  3.7  /  TBili  0.5  /  DBili  x   /  AST  19  /  ALT  16  /  AlkPhos  62  05-17          MEDICATIONS  (STANDING):  aspirin enteric coated 81 milliGRAM(s) Oral daily  atorvastatin 80 milliGRAM(s) Oral at bedtime  ceFAZolin   IVPB 2000 milliGRAM(s) IV Intermittent every 8 hours  clopidogrel Tablet 75 milliGRAM(s) Oral daily  dextrose 5%. 1000 milliLiter(s) (50 mL/Hr) IV Continuous <Continuous>  dextrose 5%. 1000 milliLiter(s) (100 mL/Hr) IV Continuous <Continuous>  dextrose 50% Injectable 25 Gram(s) IV Push once  dextrose 50% Injectable 12.5 Gram(s) IV Push once  dextrose 50% Injectable 25 Gram(s) IV Push once  DULoxetine 20 milliGRAM(s) Oral daily  escitalopram 10 milliGRAM(s) Oral daily  glucagon  Injectable 1 milliGRAM(s) IntraMuscular once  heparin   Injectable 5000 Unit(s) SubCutaneous every 8 hours  insulin lispro (ADMELOG) corrective regimen sliding scale   SubCutaneous Before meals and at bedtime  melatonin 5 milliGRAM(s) Oral at bedtime  mirtazapine 15 milliGRAM(s) Oral at bedtime  pantoprazole    Tablet 40 milliGRAM(s) Oral before breakfast  polyethylene glycol 3350 17 Gram(s) Oral daily  senna 2 Tablet(s) Oral at bedtime  sodium chloride 0.9% lock flush 3 milliLiter(s) IV Push every 8 hours  sodium chloride 0.9%. 1000 milliLiter(s) (10 mL/Hr) IV Continuous <Continuous>  tamsulosin 0.4 milliGRAM(s) Oral at bedtime    MEDICATIONS  (PRN):  acetaminophen     Tablet .. 650 milliGRAM(s) Oral every 6 hours PRN Mild Pain (1 - 3)  dextrose Oral Gel 15 Gram(s) Oral once PRN Blood Glucose LESS THAN 70 milliGRAM(s)/deciliter        RADIOLOGY & ADDITIONAL TESTS:     Patient discussed on morning rounds with Dr. Tirado    Operation / Date: 5/17 TAVR (Bhavya valve), EF Nl    SUBJECTIVE ASSESSMENT:  76y Male assessed this morning at bedside. Patient states he feels overall okay, notes he felt a little weak this morning when he got out of bed and into the chair. Denies chest pain and SOB. [facility] yet, endorses flatus.     Vital Signs Last 24 Hrs  T(C): 36.4 (18 May 2022 09:04), Max: 36.9 (18 May 2022 01:01)  T(F): 97.6 (18 May 2022 09:04), Max: 98.4 (18 May 2022 01:01)  HR: 90 (18 May 2022 11:06) (60 - 100)  BP: 122/60 (18 May 2022 11:06) (104/55 - 131/61)  BP(mean): 83 (18 May 2022 11:06) (75 - 95)  RR: 18 (18 May 2022 08:37) (15 - 18)  SpO2: 96% (18 May 2022 11:06) (91% - 100%)  I&O's Detail    17 May 2022 07:01  -  18 May 2022 07:00  --------------------------------------------------------  IN:    IV PiggyBack: 150 mL    Lactated Ringers Bolus: 500 mL    Oral Fluid: 400 mL    sodium chloride 0.9%: 240 mL  Total IN: 1290 mL    OUT:    Voided (mL): 1550 mL  Total OUT: 1550 mL    Total NET: -260 mL    18 May 2022 07:01  -  18 May 2022 11:25  --------------------------------------------------------  IN:  Total IN: 0 mL    OUT:    Voided (mL): 500 mL  Total OUT: 500 mL    Total NET: -500 mL    CHEST TUBE:  No  LIVE DRAIN:  No.  EPICARDIAL WIRES: No.  TIE DOWNS: No.  FREIRE: No.    Physical Exam  CONSTITUTIONAL: Well appearing in NAD assessed laying comfortably in bed   NEURO: A&OX3. No focal deficits noted, moving bilateral upper and lower extremities                    CV: RRR, no murmurs, rubs, gallops  RESPIRATORY: Clear to auscultation bilateral posterior lung fields, no wheezes, rales, rhonchi   GI: +BS, NT/ND  MUSKULOSKELETAL: No peripheral edema or calf tenderness. Moving bilateral upper and lower extremities.   VASCULAR: Bilateral distal pulses 2+  INCISIONS: Bilateral groins soft, no hematoma.     LABS:                        11.7   5.56  )-----------( 148      ( 18 May 2022 05:31 )             34.7       COUMADIN: No    PT/INR - ( 18 May 2022 05:31 )   PT: 12.8 sec;   INR: 1.07          PTT - ( 18 May 2022 05:31 )  PTT:29.2 sec    05-18    137  |  104  |  26<H>  ----------------------------<  108<H>  4.6   |  23  |  0.93    Ca    9.2      18 May 2022 05:31  Phos  4.2     05-17  Mg     2.1     05-18    TPro  5.8<L>  /  Alb  3.7  /  TBili  0.5  /  DBili  x   /  AST  19  /  ALT  16  /  AlkPhos  62  05-17          MEDICATIONS  (STANDING):  aspirin enteric coated 81 milliGRAM(s) Oral daily  atorvastatin 80 milliGRAM(s) Oral at bedtime  ceFAZolin   IVPB 2000 milliGRAM(s) IV Intermittent every 8 hours  clopidogrel Tablet 75 milliGRAM(s) Oral daily  dextrose 5%. 1000 milliLiter(s) (50 mL/Hr) IV Continuous <Continuous>  dextrose 5%. 1000 milliLiter(s) (100 mL/Hr) IV Continuous <Continuous>  dextrose 50% Injectable 25 Gram(s) IV Push once  dextrose 50% Injectable 12.5 Gram(s) IV Push once  dextrose 50% Injectable 25 Gram(s) IV Push once  DULoxetine 20 milliGRAM(s) Oral daily  escitalopram 10 milliGRAM(s) Oral daily  glucagon  Injectable 1 milliGRAM(s) IntraMuscular once  heparin   Injectable 5000 Unit(s) SubCutaneous every 8 hours  insulin lispro (ADMELOG) corrective regimen sliding scale   SubCutaneous Before meals and at bedtime  melatonin 5 milliGRAM(s) Oral at bedtime  mirtazapine 15 milliGRAM(s) Oral at bedtime  pantoprazole    Tablet 40 milliGRAM(s) Oral before breakfast  polyethylene glycol 3350 17 Gram(s) Oral daily  senna 2 Tablet(s) Oral at bedtime  sodium chloride 0.9% lock flush 3 milliLiter(s) IV Push every 8 hours  sodium chloride 0.9%. 1000 milliLiter(s) (10 mL/Hr) IV Continuous <Continuous>  tamsulosin 0.4 milliGRAM(s) Oral at bedtime    MEDICATIONS  (PRN):  acetaminophen     Tablet .. 650 milliGRAM(s) Oral every 6 hours PRN Mild Pain (1 - 3)  dextrose Oral Gel 15 Gram(s) Oral once PRN Blood Glucose LESS THAN 70 milliGRAM(s)/deciliter      RADIOLOGY & ADDITIONAL TESTS:    xx< from: Xray Chest 1 View- PORTABLE-Routine (Xray Chest 1 View- PORTABLE-Routine in AM.) (05.18.22 @ 05:19) >  Findings/  impression: Right basilar focal atelectasis. Heart size within normal   limits, TAVR. Stable bony structures.    < end of copied text >

## 2022-05-18 NOTE — PHYSICAL THERAPY INITIAL EVALUATION ADULT - ACTIVE RANGE OF MOTION EXAMINATION, REHAB EVAL
except decreased left ankle DF/angela. upper extremity Active ROM was WNL (within normal limits)/bilateral lower extremity Active ROM was WNL (within normal limits)

## 2022-05-18 NOTE — PHYSICAL THERAPY INITIAL EVALUATION ADULT - COORDINATION ASSESSED, REHAB EVAL
slight dysmetria left UE with finger to nose. Slight decreased coordination left LE with heel to shin/finger to nose/heel to shin

## 2022-05-18 NOTE — PHYSICAL THERAPY INITIAL EVALUATION ADULT - ADDITIONAL COMMENTS
Patient lives with wife in a house with 12 steps to enter with 2 rails. Patient was able to ambulate with RW PTA and able to do stairs with 2 rails and assist. Patient also uses brace for left leg

## 2022-05-18 NOTE — PHYSICAL THERAPY INITIAL EVALUATION ADULT - GENERAL OBSERVATIONS, REHAB EVAL
As per DEMETRIO Gagnon patient cleared for PT. received sitting on EOB + telemetry, IV, SCDs, in NAD. Agreeable to PT

## 2022-05-18 NOTE — PHYSICAL THERAPY INITIAL EVALUATION ADULT - PERTINENT HX OF CURRENT PROBLEM, REHAB EVAL
This 76 year old male with PMH significant for HTN, HLD, NIDDM, coronary artery disease with PCI, spinal ataxia (walker), prostate cancer with radiation (in remission), LYNDA on CPAP, chronic arthritis with neck and back pain, chronic diastolic heart failure, and severe aortic stenosis with s/p BAV and PCI on 3/4/22 (procedure was complicated by pseudoaneurysm of left ICA without dissection or active bleeding and acute cerebellar microemboli presented to this admission for TAVR intervention

## 2022-05-19 ENCOUNTER — TRANSCRIPTION ENCOUNTER (OUTPATIENT)
Age: 77
End: 2022-05-19

## 2022-05-19 VITALS — TEMPERATURE: 99 F

## 2022-05-19 LAB
ANION GAP SERPL CALC-SCNC: 8 MMOL/L — SIGNIFICANT CHANGE UP (ref 5–17)
APTT BLD: 27.8 SEC — SIGNIFICANT CHANGE UP (ref 27.5–35.5)
BUN SERPL-MCNC: 25 MG/DL — HIGH (ref 7–23)
CALCIUM SERPL-MCNC: 9 MG/DL — SIGNIFICANT CHANGE UP (ref 8.4–10.5)
CHLORIDE SERPL-SCNC: 104 MMOL/L — SIGNIFICANT CHANGE UP (ref 96–108)
CO2 SERPL-SCNC: 25 MMOL/L — SIGNIFICANT CHANGE UP (ref 22–31)
CREAT SERPL-MCNC: 0.93 MG/DL — SIGNIFICANT CHANGE UP (ref 0.5–1.3)
EGFR: 85 ML/MIN/1.73M2 — SIGNIFICANT CHANGE UP
GLUCOSE BLDC GLUCOMTR-MCNC: 140 MG/DL — HIGH (ref 70–99)
GLUCOSE BLDC GLUCOMTR-MCNC: 193 MG/DL — HIGH (ref 70–99)
GLUCOSE SERPL-MCNC: 138 MG/DL — HIGH (ref 70–99)
HCT VFR BLD CALC: 30.3 % — LOW (ref 39–50)
HGB BLD-MCNC: 10.4 G/DL — LOW (ref 13–17)
INR BLD: 1.18 — HIGH (ref 0.88–1.16)
MAGNESIUM SERPL-MCNC: 2.1 MG/DL — SIGNIFICANT CHANGE UP (ref 1.6–2.6)
MCHC RBC-ENTMCNC: 31.4 PG — SIGNIFICANT CHANGE UP (ref 27–34)
MCHC RBC-ENTMCNC: 34.3 GM/DL — SIGNIFICANT CHANGE UP (ref 32–36)
MCV RBC AUTO: 91.5 FL — SIGNIFICANT CHANGE UP (ref 80–100)
NRBC # BLD: 0 /100 WBCS — SIGNIFICANT CHANGE UP (ref 0–0)
PLATELET # BLD AUTO: 133 K/UL — LOW (ref 150–400)
POTASSIUM SERPL-MCNC: 4.4 MMOL/L — SIGNIFICANT CHANGE UP (ref 3.5–5.3)
POTASSIUM SERPL-SCNC: 4.4 MMOL/L — SIGNIFICANT CHANGE UP (ref 3.5–5.3)
PROTHROM AB SERPL-ACNC: 14.1 SEC — HIGH (ref 10.5–13.4)
RBC # BLD: 3.31 M/UL — LOW (ref 4.2–5.8)
RBC # FLD: 14 % — SIGNIFICANT CHANGE UP (ref 10.3–14.5)
SODIUM SERPL-SCNC: 137 MMOL/L — SIGNIFICANT CHANGE UP (ref 135–145)
WBC # BLD: 4.45 K/UL — SIGNIFICANT CHANGE UP (ref 3.8–10.5)
WBC # FLD AUTO: 4.45 K/UL — SIGNIFICANT CHANGE UP (ref 3.8–10.5)

## 2022-05-19 PROCEDURE — C1760: CPT

## 2022-05-19 PROCEDURE — 97161 PT EVAL LOW COMPLEX 20 MIN: CPT

## 2022-05-19 PROCEDURE — 82803 BLOOD GASES ANY COMBINATION: CPT

## 2022-05-19 PROCEDURE — C1884: CPT

## 2022-05-19 PROCEDURE — C1889: CPT

## 2022-05-19 PROCEDURE — 86900 BLOOD TYPING SEROLOGIC ABO: CPT

## 2022-05-19 PROCEDURE — 85025 COMPLETE CBC W/AUTO DIFF WBC: CPT

## 2022-05-19 PROCEDURE — 71045 X-RAY EXAM CHEST 1 VIEW: CPT | Mod: 26

## 2022-05-19 PROCEDURE — 99024 POSTOP FOLLOW-UP VISIT: CPT

## 2022-05-19 PROCEDURE — 85730 THROMBOPLASTIN TIME PARTIAL: CPT

## 2022-05-19 PROCEDURE — 80048 BASIC METABOLIC PNL TOTAL CA: CPT

## 2022-05-19 PROCEDURE — 86850 RBC ANTIBODY SCREEN: CPT

## 2022-05-19 PROCEDURE — 36415 COLL VENOUS BLD VENIPUNCTURE: CPT

## 2022-05-19 PROCEDURE — 86901 BLOOD TYPING SEROLOGIC RH(D): CPT

## 2022-05-19 PROCEDURE — 83735 ASSAY OF MAGNESIUM: CPT

## 2022-05-19 PROCEDURE — 84295 ASSAY OF SERUM SODIUM: CPT

## 2022-05-19 PROCEDURE — 93306 TTE W/DOPPLER COMPLETE: CPT

## 2022-05-19 PROCEDURE — 86923 COMPATIBILITY TEST ELECTRIC: CPT

## 2022-05-19 PROCEDURE — 83880 ASSAY OF NATRIURETIC PEPTIDE: CPT

## 2022-05-19 PROCEDURE — 93010 ELECTROCARDIOGRAM REPORT: CPT

## 2022-05-19 PROCEDURE — 82330 ASSAY OF CALCIUM: CPT

## 2022-05-19 PROCEDURE — 82962 GLUCOSE BLOOD TEST: CPT

## 2022-05-19 PROCEDURE — C1769: CPT

## 2022-05-19 PROCEDURE — 94660 CPAP INITIATION&MGMT: CPT

## 2022-05-19 PROCEDURE — L8699: CPT

## 2022-05-19 PROCEDURE — C1725: CPT

## 2022-05-19 PROCEDURE — C1887: CPT

## 2022-05-19 PROCEDURE — 80053 COMPREHEN METABOLIC PANEL: CPT

## 2022-05-19 PROCEDURE — 85610 PROTHROMBIN TIME: CPT

## 2022-05-19 PROCEDURE — 84132 ASSAY OF SERUM POTASSIUM: CPT

## 2022-05-19 PROCEDURE — C1894: CPT

## 2022-05-19 PROCEDURE — 71045 X-RAY EXAM CHEST 1 VIEW: CPT

## 2022-05-19 PROCEDURE — 93005 ELECTROCARDIOGRAM TRACING: CPT

## 2022-05-19 PROCEDURE — 85027 COMPLETE CBC AUTOMATED: CPT

## 2022-05-19 PROCEDURE — 84100 ASSAY OF PHOSPHORUS: CPT

## 2022-05-19 RX ORDER — PANTOPRAZOLE SODIUM 20 MG/1
1 TABLET, DELAYED RELEASE ORAL
Qty: 30 | Refills: 0
Start: 2022-05-19 | End: 2022-06-17

## 2022-05-19 RX ORDER — RILUZOLE 50 MG/1
0 TABLET ORAL
Qty: 0 | Refills: 0 | DISCHARGE

## 2022-05-19 RX ORDER — METOPROLOL TARTRATE 50 MG
0.5 TABLET ORAL
Qty: 0 | Refills: 0 | DISCHARGE

## 2022-05-19 RX ORDER — ATORVASTATIN CALCIUM 80 MG/1
1 TABLET, FILM COATED ORAL
Qty: 30 | Refills: 0
Start: 2022-05-19 | End: 2022-06-17

## 2022-05-19 RX ORDER — TAMSULOSIN HYDROCHLORIDE 0.4 MG/1
1 CAPSULE ORAL
Qty: 30 | Refills: 0
Start: 2022-05-19 | End: 2022-06-17

## 2022-05-19 RX ORDER — ATORVASTATIN CALCIUM 80 MG/1
1 TABLET, FILM COATED ORAL
Qty: 0 | Refills: 0 | DISCHARGE

## 2022-05-19 RX ORDER — CLOPIDOGREL BISULFATE 75 MG/1
1 TABLET, FILM COATED ORAL
Qty: 30 | Refills: 0
Start: 2022-05-19 | End: 2022-06-17

## 2022-05-19 RX ORDER — TAMSULOSIN HYDROCHLORIDE 0.4 MG/1
1 CAPSULE ORAL
Qty: 0 | Refills: 0 | DISCHARGE

## 2022-05-19 RX ORDER — VALSARTAN 80 MG/1
1 TABLET ORAL
Qty: 0 | Refills: 0 | DISCHARGE

## 2022-05-19 RX ORDER — ASPIRIN/CALCIUM CARB/MAGNESIUM 324 MG
1 TABLET ORAL
Qty: 0 | Refills: 0 | DISCHARGE

## 2022-05-19 RX ORDER — ACETAMINOPHEN 500 MG
2 TABLET ORAL
Qty: 56 | Refills: 0
Start: 2022-05-19 | End: 2022-05-25

## 2022-05-19 RX ORDER — POLYETHYLENE GLYCOL 3350 17 G/17G
17 POWDER, FOR SOLUTION ORAL
Qty: 119 | Refills: 0
Start: 2022-05-19 | End: 2022-05-25

## 2022-05-19 RX ORDER — ASPIRIN/CALCIUM CARB/MAGNESIUM 324 MG
1 TABLET ORAL
Qty: 30 | Refills: 0
Start: 2022-05-19 | End: 2022-06-17

## 2022-05-19 RX ADMIN — SODIUM CHLORIDE 3 MILLILITER(S): 9 INJECTION INTRAMUSCULAR; INTRAVENOUS; SUBCUTANEOUS at 06:22

## 2022-05-19 RX ADMIN — Medication 81 MILLIGRAM(S): at 11:44

## 2022-05-19 RX ADMIN — HEPARIN SODIUM 5000 UNIT(S): 5000 INJECTION INTRAVENOUS; SUBCUTANEOUS at 14:31

## 2022-05-19 RX ADMIN — PANTOPRAZOLE SODIUM 40 MILLIGRAM(S): 20 TABLET, DELAYED RELEASE ORAL at 06:23

## 2022-05-19 RX ADMIN — DULOXETINE HYDROCHLORIDE 20 MILLIGRAM(S): 30 CAPSULE, DELAYED RELEASE ORAL at 11:44

## 2022-05-19 RX ADMIN — Medication 650 MILLIGRAM(S): at 08:14

## 2022-05-19 RX ADMIN — SODIUM CHLORIDE 3 MILLILITER(S): 9 INJECTION INTRAMUSCULAR; INTRAVENOUS; SUBCUTANEOUS at 14:29

## 2022-05-19 RX ADMIN — ESCITALOPRAM OXALATE 10 MILLIGRAM(S): 10 TABLET, FILM COATED ORAL at 11:44

## 2022-05-19 RX ADMIN — LIDOCAINE 1 PATCH: 4 CREAM TOPICAL at 00:51

## 2022-05-19 RX ADMIN — CLOPIDOGREL BISULFATE 75 MILLIGRAM(S): 75 TABLET, FILM COATED ORAL at 11:43

## 2022-05-19 RX ADMIN — Medication 100 MILLIGRAM(S): at 03:00

## 2022-05-19 RX ADMIN — Medication 2: at 12:58

## 2022-05-19 RX ADMIN — HEPARIN SODIUM 5000 UNIT(S): 5000 INJECTION INTRAVENOUS; SUBCUTANEOUS at 06:23

## 2022-05-19 RX ADMIN — POLYETHYLENE GLYCOL 3350 17 GRAM(S): 17 POWDER, FOR SOLUTION ORAL at 11:44

## 2022-05-19 RX ADMIN — LIDOCAINE 1 PATCH: 4 CREAM TOPICAL at 14:31

## 2022-05-19 NOTE — PROGRESS NOTE ADULT - ASSESSMENT
Med Reconciliation:  Medication Reconciliation Status	Admission Reconciliation is Completed  Discharge Reconciliation is Completed  	  Discharge Medications	acetaminophen 325 mg oral tablet: 2 tab(s) orally every 6 hours, As needed, Mild Pain (1 - 3)  aspirin 81 mg oral delayed release tablet: 1 tab(s) orally once a day  atorvastatin 80 mg oral tablet: 1 tab(s) orally once a day (at bedtime)  clopidogrel 75 mg oral tablet: 1 tab(s) orally once a day  DULoxetine 20 mg oral delayed release capsule: 1 cap(s) orally once a day  glipiZIDE: 20 milligram(s) orally once a day  Lexapro 10 mg oral tablet: 1 tab(s) orally once a day  melatonin 5 mg oral tablet: 1 tab(s) orally once a day (at bedtime)  metFORMIN 1000 mg oral tablet, extended release: 1 tab(s) orally once a day   mirtazapine 15 mg oral tablet: 1 tab(s) orally once a day (at bedtime)  pantoprazole 40 mg oral delayed release tablet: 1 tab(s) orally once a day (before a meal)  polyethylene glycol 3350 oral powder for reconstitution: 17 gram(s) orally once a day, As Needed for Constipation  tamsulosin 0.4 mg oral capsule: 1 cap(s) orally once a day (at bedtime)  	  ,  ,     Care Plan/Procedures:  Discharge Diagnoses, Assessment and Plan of Treatment	PRINCIPAL DISCHARGE DIAGNOSIS  Diagnosis: Aortic stenosis  Assessment and Plan of Treatment:  Discharge Procedures, Findings and Treatment	PRINCIPAL PROCEDURE  Procedure: TAVR, percutaneous  Findings and Treatment:  Goal(s)	To get better and follow your care plan as instructed.     Follow Up:  Care Providers for Follow up (PCP/Outpatient Provider)	Bryce Tirado)  Cardiovascular Surgery  [address] Floor, [place] [address]  Phone: [phone]  Fax: [phone]  Follow Up Time: 1 week    Marjorie Rodriguez)  Cardiovascular Disease; Internal Medicine; Interventional Cardiology  [address]  [place] [address]  Phone: [phone]  Fax: [phone]  Follow Up Time: 2 weeks  Additional Scheduled Appointments	Please Attend all of your Outpatient Followup Appointments     Our Office will call you to schedule your appointments after discharge  Discharge Diet	DASH Diet  Activity	Do not drive or operate machinery, Do not make important decisions, No heavy lifting/straining, Stairs allowed, Walking - Indoors allowed, Walking - Outdoors allowed  Additional Instructions	-Walk daily as tolerated and use your incentive spirometer 10 times every hour while you are awake.     -Please weigh yourself daily. If you notice over a 3 pound weight gain in 3 days, this is a sign you are likely retaining too much fluid. It is imperative you call our right away with unexplained rapid weight gain.      -Please continue to wear the compression stockings given to you in the hospital at home. This is a way to prevent fluid from building up in your legs.     -No driving or strenuous activity/exercise until cleared by your surgeon.    -Gently clean your incisions with unscented/antibacterial soap and water, pat dry.  You may leave them open to air.    -Call your doctor if you have shortness of breath, chest pain not relieved by pain medication, dizziness, fever >100.5, or increased redness or drainage from incisions.     *********************************    You had a MCOT monitor (an external cardiac rhythm monitoring device) placed on your day of discharge.  This helps us monitor your heart while you are out of the hospital for 30 days after discharge. Should your heart go into an abnormal or dangerous rhythm you will receieve a call from the MCOT team and your Structural Heart team of Doctors and PA's will be notified.    1. Keep the monitor within 30 feet of you at all times.  2. When you feel any symptom (chest pain, dizziness, palpitations, weakness, fatigue or anything outside of your normal), press the “Record Symptoms” button on the main phone of your phone  3. Shower or exercise as normal whilewearing the MCOT Patch. Do not swim or take a bath. Patch is water-resistant, not waterproof  4. When the battery is low on the phone or on the device, use the supplied . The monitor will show a warning message when the battery is low.  5. Do not remove the patch from yourskin after you begin monitoring. With normal wear, each patch should last 5 days. To replace the patch follow instructions in the MCOT box with the Patch Guide  6. Any issues with the MCOT device or phone please call Customer Service at [phone].  9. If you have any other questions at all please call the Structural Heart office at [phone]     ********************************    Please follow up with your prescribing doctor about restarting home medications (Riluzole)  
A/P:    This is a 77 y/o male former smoker, with PMHx of HTN, HLD, NIDDM, CAD s/p PCI (diagonal branch), spinal ataxia (uses a walker at baseline), prostate CA s/p radiation, in remission x 8-9 years, LYNDA on CPAP at home with good compliance, chronic arthritis with neck and back pain and AS s/p BAV and PCI 3/4/22 with post op course complicated by pseudoaneurysm of left ICA and cerebellar microemboli causing LLE weakness. He ultimately was discharged to acute rehab on 3/10. On 5/17 he represented to [facility] for scheduled TAVR procedure. He underwent an uncomplicated TAVR (Bhavya valve). Post operatively he was brought to [facility] as mini ICU care. POD1 became hypotensive when got out of bed in the morning which resolved after fluid resuscitation     Neurovascular:   - No delirium. Pain well controlled with current regimen.  -Continue tylenol PRN  - Microemboli during last admission causing LLE weakness, now resolved   - Continue home duloxetine 20 mg daily, lexapro 10 mg daily, mirtazapine 15 mg daily   - Insomnia: continue melatonin     Cardiovascular:   - POD1 s/p TAVR (Bhavya valve)  -Hemodynamically stable. HR controlled (60-83)  - Hx of HTN, BP controlled (115//64)  - Post op orthostatic hypotension when getting into chair POD1 AM, holding antihypertensives for now, plan to resume half home losartan if requires agent later today/ tomorrow   - Hx CAD s/p PCI: continue ASA, Plavix, atorvastatin 80 mg  - TTE post TAVR pending     Respiratory:   - 02 Sat = 98% on RA.  -If on oxygen wean to RA from for O2 Sat > 93%.  - LYNDA, CPAP at home, ordered nocturnal BiPAP at night   -Encourage C+DB and Use of IS 10x / hr while awake.  -CXR without effusions, consolidations     GI:   - Stable.  -Continue GI PPX with protonix  -PO Diet.    Renal / :  - BUN/Cr stable at 26/0.93  -Continue to monitor renal function.  -Monitor I/O's.  - Replete electrolytes PRN    Endocrine:    -A1c 6.4, known hx DM, continue MISS  -TSH 2.270, no known hx thyroid disease     Hematologic:  -H/H stable at 11.7/34.7 with no obvious signs of bleeding  -Coagulation Panel.    ID:  -Pt remains afebrile with no elevation in WBC or signs of infection  -Continue to monitor CBC  -Observe for SIRS/Sepsis Syndrome.    Prophylaxis:  -DVT prophylaxis with 5000 SubQ Heparin q8h.  -SCD's    Disposition:  -Pending dispo to rehab   
A/P:    This is a 77 y/o male former smoker, with PMHx of HTN, HLD, NIDDM, CAD s/p PCI (diagonal branch), spinal ataxia (uses a walker at baseline), prostate CA s/p radiation, in remission x 8-9 years, LYNDA on CPAP at home with good compliance, chronic arthritis with neck and back pain and AS s/p BAV and PCI 3/4/22 with post op course complicated by pseudoaneurysm of left ICA and cerebellar microemboli causing LLE weakness. He ultimately was discharged to acute rehab on 3/10. On 5/17 he represented to [facility] for scheduled TAVR procedure. He underwent an uncomplicated TAVR (Bhavya valve). Post operatively he was brought to [facility] as mini ICU care.     Neurovascular:   - No delirium. Pain well controlled with current regimen.  -Continue tylenol PRN  - Microemboli during last admission causing LLE  - Continue home duloxetine 20 mg daily, lexapro 10 mg daily, mirtazapine 15 mg daily     Cardiovascular:   - POD0 s/p TAVR (Bhavya valve)  -Hemodynamically stable. HR controlled (66-79)  - Hx of HTN, BP controlled (112//61), resume antihypertensives as indicated   - ASA, Plavix, atorvastatin 80 mg  - TTE tomorrow AM    Respiratory:   - 02 Sat = 98% on 2L NC.  -If on oxygen wean to RA from for O2 Sat > 93%.  - LYNDA, CPAP at home, ordered nocturnal BiPAP at night   -Encourage C+DB and Use of IS 10x / hr while awake.  -CXR without effusions, consolidations     GI:   - Stable.  -Continue GI PPX with protonix  -PO Diet.    Renal / :  - BUN/Cr stable at 27/0.87  -Continue to monitor renal function.  -Monitor I/O's.  - Replete electrolytes PRN    Endocrine:    -A1c 6.4, known hx DM, continue MISS  -TSH 2.270, no known hx thyroid disease     Hematologic:  -H/H stable at 11.1/33.1 with no obvious signs of bleeding  -Coagulation Panel.    ID:  -Pt remains afebrile with no elevation in WBC or signs of infection  -Continue to monitor CBC  -Observe for SIRS/Sepsis Syndrome.    Prophylaxis:  -DVT prophylaxis with 5000 SubQ Heparin q8h.  -SCD's    [facility]:  [facility]

## 2022-05-19 NOTE — PHYSICAL EXAM
[PERRL With Normal Accommodation] : pupils were equal in size, round, and reactive to light [Jugular Venous Distention Increased] : there was no jugular-venous distention [] : no respiratory distress [Respiration, Rhythm And Depth] : normal respiratory rhythm and effort [Auscultation Breath Sounds / Voice Sounds] : lungs were clear to auscultation bilaterally [Bowel Sounds] : normal bowel sounds [Abdomen Soft] : soft [Abdomen Tenderness] : non-tender [No CVA Tenderness] : no ~M costovertebral angle tenderness [Oriented To Time, Place, And Person] : oriented to person, place, and time [FreeTextEntry1] : uses a walker

## 2022-05-19 NOTE — DISCHARGE NOTE NURSING/CASE MANAGEMENT/SOCIAL WORK - NSDCPEFALRISK_GEN_ALL_CORE
For information on Fall & Injury Prevention, visit: [email or web address] OR  [email or web address] OR  https://www.cdc.gov/steadi/patient.html

## 2022-05-19 NOTE — DISCHARGE NOTE PROVIDER - NSDCFUADDINST_GEN_ALL_CORE_FT
-Walk daily as tolerated and use your incentive spirometer 10 times every hour while you are awake.     -Please weigh yourself daily. If you notice over a 3 pound weight gain in 3 days, this is a sign you are likely retaining too much fluid. It is imperative you call our right away with unexplained rapid weight gain.      -Please continue to wear the compression stockings given to you in the hospital at home. This is a way to prevent fluid from building up in your legs.     -No driving or strenuous activity/exercise until cleared by your surgeon.    -Gently clean your incisions with unscented/antibacterial soap and water, pat dry.  You may leave them open to air.    -Call your doctor if you have shortness of breath, chest pain not relieved by pain medication, dizziness, fever >100.5, or increased redness or drainage from incisions.     *********************************    You had a MCOT monitor (an external cardiac rhythm monitoring device) placed on your day of discharge.  This helps us monitor your heart while you are out of the hospital for 30 days after discharge. Should your heart go into an abnormal or dangerous rhythm you will receieve a call from the MCOT team and your Structural Heart team of Doctors and PA's will be notified.    1. Keep the monitor within 30 feet of you at all times.  2. When you feel any symptom (chest pain, dizziness, palpitations, weakness, fatigue or anything outside of your normal), press the “Record Symptoms” button on the main phone of your phone  3. Shower or exercise as normal whilewearing the MCOT Patch. Do not swim or take a bath. Patch is water-resistant, not waterproof  4. When the battery is low on the phone or on the device, use the supplied . The monitor will show a warning message when the battery is low.  5. Do not remove the patch from yourskin after you begin monitoring. With normal wear, each patch should last 5 days. To replace the patch follow instructions in the MCOT box with the Patch Guide  6. Any issues with the MCOT device or phone please call [employer] at [phone].  7. If you have any other questions at all please call the Structural Heart office at [phone]  -Walk daily as tolerated and use your incentive spirometer 10 times every hour while you are awake.     -Please weigh yourself daily. If you notice over a 3 pound weight gain in 3 days, this is a sign you are likely retaining too much fluid. It is imperative you call our right away with unexplained rapid weight gain.      -Please continue to wear the compression stockings given to you in the hospital at home. This is a way to prevent fluid from building up in your legs.     -No driving or strenuous activity/exercise until cleared by your surgeon.    -Gently clean your incisions with unscented/antibacterial soap and water, pat dry.  You may leave them open to air.    -Call your doctor if you have shortness of breath, chest pain not relieved by pain medication, dizziness, fever >100.5, or increased redness or drainage from incisions.     *********************************    You had a MCOT monitor (an external cardiac rhythm monitoring device) placed on your day of discharge.  This helps us monitor your heart while you are out of the hospital for 30 days after discharge. Should your heart go into an abnormal or dangerous rhythm you will receieve a call from the MCOT team and your Structural Heart team of Doctors and PA's will be notified.    1. Keep the monitor within 30 feet of you at all times.  2. When you feel any symptom (chest pain, dizziness, palpitations, weakness, fatigue or anything outside of your normal), press the “Record Symptoms” button on the main phone of your phone  3. Shower or exercise as normal whilewearing the MCOT Patch. Do not swim or take a bath. Patch is water-resistant, not waterproof  4. When the battery is low on the phone or on the device, use the supplied . The monitor will show a warning message when the battery is low.  5. Do not remove the patch from yourskin after you begin monitoring. With normal wear, each patch should last 5 days. To replace the patch follow instructions in the MCOT box with the Patch Guide  6. Any issues with the MCOT device or phone please call [employer] [facility] at [phone].  7. If you have any other questions at all please call the Structural Heart office at [phone]     ********************************    Please follow up with your prescribing doctor about restarting home medications (Riluzole)

## 2022-05-19 NOTE — PROGRESS NOTE ADULT - SUBJECTIVE AND OBJECTIVE BOX
Patient discussed on morning rounds with Dr. Tirado    Operation / Date:   5/17 TAVR (Bhavya valve), EF Nl    Surgeon: Dr. Tirado    Referring Physician: Marjorie Chung    SUBJECTIVE ASSESSMENT:  76y Male seen at bedside today. Patient endorses pain and difficulty walking for which he will be sent to rehab at discharge. Denies CP, SOB, N/V, Fever, Chills. States he is urinating without issue and having regular bowel movments    Hospital Course:  This is a 75 y/o male former smoker, with PMHx of HTN, HLD, NIDDM, CAD s/p PCI (diagonal branch), spinal ataxia (uses a walker at baseline), prostate CA s/p radiation, in remission x 8-9 years, LYNDA on CPAP at home with good compliance, chronic arthritis with neck and back pain and AS s/p BAV and PCI 3/4/22 with post op course complicated by pseudoaneurysm of left ICA and cerebellar microemboli causing LLE weakness. He ultimately was discharged to acute rehab on 3/10. On 5/17 he represented to [facility] for scheduled TAVR procedure. He underwent an uncomplicated TAVR (Bhavya valve). Post operatively he was brought to [facility] in stable condition as mini ICU care. POD1 episode of hypotension when getting out of bed, resolved after fluid resuscitation. POD2 per Dr. Tirado, patient is safe and ready for discharge to rehab with MCOT device. MCOT was explained to patient and placed prior to discharge. On day of discharge patient was urinating without issue, having regular bowel movements, and having trouble walking necessitating rehab placement.     Vital Signs Last 24 Hrs  T(C): 37 (19 May 2022 09:56), Max: 37.6 (19 May 2022 01:01)  T(F): 98.6 (19 May 2022 09:56), Max: 99.6 (19 May 2022 01:01)  HR: 93 (19 May 2022 11:45) (72 - 95)  BP: 96/54 (19 May 2022 08:45) (96/54 - 136/59)  BP(mean): 70 (19 May 2022 08:45) (70 - 85)  RR: 16 (19 May 2022 11:45) (16 - 18)  SpO2: 93% (19 May 2022 11:45) (93% - 97%)  I&O's Detail    18 May 2022 07:01  -  19 May 2022 07:00  --------------------------------------------------------  IN:    IV PiggyBack: 150 mL    sodium chloride 0.9%: 90 mL  Total IN: 240 mL    OUT:    Voided (mL): 900 mL  Total OUT: 900 mL    Total NET: -660 mL      EPICARDIAL WIRES REMOVED: None.  TIE DOWNS REMOVED: None.    PHYSICAL EXAM:  General: Well appearing, laying in bed on an incline, NAD  Neurological: AOx3, no focal neurological deficits  Cardiovascular: Regular Rate/Rhythm, S1/2 auscultated, No extra heart sounds  Respiratory: CTA b/l over all lung fields, No adventitious breath sounds  Gastrointestinal: Bowel sounds present in all 4 quadrants, Abdomen is soft, non-tender, non-distended  Extremities: No peripheral edema noted, 5/5 strength and full range of motion in all 4 extremities b/l  Vascular: 2+ peripheral pulses b/l in upper and lower extremities, Radial, DP  Incision Sites: b/l groin incisions c/d/i, no erythema, no purulence, no hematoma    LABS:                        10.4   4.45  )-----------( 133      ( 19 May 2022 06:36 )             30.3       COUMADIN:  No.       PT/INR - ( 19 May 2022 06:28 )   PT: 14.1 sec;   INR: 1.18          PTT - ( 19 May 2022 06:28 )  PTT:27.8 sec    05-19    137  |  104  |  25<H>  ----------------------------<  138<H>  4.4   |  25  |  0.93    Ca    9.0      19 May 2022 06:28  Phos  4.2     05-17  Mg     2.1     05-19    TPro  5.8<L>  /  Alb  3.7  /  TBili  0.5  /  DBili  x   /  AST  19  /  ALT  16  /  AlkPhos  62  05-17      MEDICATIONS  (STANDING):  aspirin enteric coated 81 milliGRAM(s) Oral daily  atorvastatin 80 milliGRAM(s) Oral at bedtime  clopidogrel Tablet 75 milliGRAM(s) Oral daily  dextrose 5%. 1000 milliLiter(s) (50 mL/Hr) IV Continuous <Continuous>  dextrose 5%. 1000 milliLiter(s) (100 mL/Hr) IV Continuous <Continuous>  dextrose 50% Injectable 25 Gram(s) IV Push once  dextrose 50% Injectable 12.5 Gram(s) IV Push once  dextrose 50% Injectable 25 Gram(s) IV Push once  DULoxetine 20 milliGRAM(s) Oral daily  escitalopram 10 milliGRAM(s) Oral daily  glucagon  Injectable 1 milliGRAM(s) IntraMuscular once  heparin   Injectable 5000 Unit(s) SubCutaneous every 8 hours  insulin lispro (ADMELOG) corrective regimen sliding scale   SubCutaneous Before meals and at bedtime  lidocaine   4% Patch 1 Patch Transdermal every 24 hours  melatonin 5 milliGRAM(s) Oral at bedtime  mirtazapine 15 milliGRAM(s) Oral at bedtime  pantoprazole    Tablet 40 milliGRAM(s) Oral before breakfast  polyethylene glycol 3350 17 Gram(s) Oral daily  senna 2 Tablet(s) Oral at bedtime  sodium chloride 0.9% lock flush 3 milliLiter(s) IV Push every 8 hours  sodium chloride 0.9%. 1000 milliLiter(s) (10 mL/Hr) IV Continuous <Continuous>  tamsulosin 0.4 milliGRAM(s) Oral at bedtime      Discharge CXR:  < from: Xray Chest 1 View- PORTABLE-Routine (Xray Chest 1 View- PORTABLE-Routine in AM.) (05.19.22 @ 05:24) >  IMPRESSION:  Lungs clear. No infiltrate pleural effusion or pneumothorax. No acute   bone abnormality. Postop change.  < end of copied text >    Discharge ECHO:  < from: TTE Echo Complete w/o Contrast w/ Doppler (05.18.22 @ 11:32) >  CONCLUSIONS:   1. Hyperdynamic left ventricular systolic function with cavity   obliteration resulting in an intra-cavitary gradient of 23.00 mmHg.   2. Mild to moderate symmetric left ventricular hypertrophy.   3. Normal right ventricular size and systolic function.   4. Normal atria.   5. 29 mm Bhavya 3 valve is noted in the aortic position with normal   function. There is trace to mild paravalvular aortic regurgitation. The   peak transvalvular velocity is 2.37 m/s, the mean transvalvular gradient   is 13.00 mmHg, and the LVOT/AV velocity ratio is 0.67.   6. No other significant valvular disease.   7. No evidence of pulmonary hypertension.   8. No pericardial effusion.   9. Compared to the previous TTE performed on 3/5/2022, patient is now   s/p percutaneous aortic valve replacement and the aortic stenosis has   resolved.  < end of copied text >

## 2022-05-19 NOTE — DISCHARGE NOTE PROVIDER - CARE PROVIDER_API CALL
Bryce Tirado)  Cardiovascular Surgery  [address] Floor, [place] [address]  Phone: [phone]  Fax: [phone]  Follow Up Time: 1 week    Marjorie Rodriguez)  Cardiovascular Disease; Internal Medicine; Interventional Cardiology  [address]  [place] [address]  Phone: [phone]  Fax: [phone]  Follow Up Time: 2 weeks

## 2022-05-19 NOTE — DISCHARGE NOTE PROVIDER - NSDCFUADDAPPT_GEN_ALL_CORE_FT
Please Attend all of your Outpatient Followup Appointments     Our Office will call you to schedule your appointments after discharge

## 2022-05-19 NOTE — DISCHARGE NOTE PROVIDER - NSDCMRMEDTOKEN_GEN_ALL_CORE_FT
aspirin 81 mg oral delayed release tablet: 1 tab(s) orally once a day  clopidogrel 75 mg oral tablet: 1 tab(s) orally once a day  DULoxetine 20 mg oral delayed release capsule: 1 cap(s) orally once a day  Flomax 0.4 mg oral capsule: 1 cap(s) orally once a day  glipiZIDE: 20 milligram(s) orally once a day  Lexapro 10 mg oral tablet: 1 tab(s) orally once a day  Lipitor 80 mg oral tablet: 1 tab(s) orally once a day  melatonin 5 mg oral tablet: 1 tab(s) orally once a day (at bedtime)  metFORMIN 1000 mg oral tablet, extended release: 1 tab(s) orally once a day   Metoprolol Tartrate 25 mg oral tablet: 0.5 tab(s) orally every 12 hours  mirtazapine 15 mg oral tablet: 1 tab(s) orally once a day (at bedtime)  polyethylene glycol 3350 oral powder for reconstitution: 17 gram(s) orally once a day  riluzole:   valsartan 40 mg oral tablet: 1 tab(s) orally once a day   acetaminophen 325 mg oral tablet: 2 tab(s) orally every 6 hours, As needed, Mild Pain (1 - 3)  aspirin 81 mg oral delayed release tablet: 1 tab(s) orally once a day  atorvastatin 80 mg oral tablet: 1 tab(s) orally once a day (at bedtime)  clopidogrel 75 mg oral tablet: 1 tab(s) orally once a day  DULoxetine 20 mg oral delayed release capsule: 1 cap(s) orally once a day  glipiZIDE: 20 milligram(s) orally once a day  Lexapro 10 mg oral tablet: 1 tab(s) orally once a day  melatonin 5 mg oral tablet: 1 tab(s) orally once a day (at bedtime)  metFORMIN 1000 mg oral tablet, extended release: 1 tab(s) orally once a day   mirtazapine 15 mg oral tablet: 1 tab(s) orally once a day (at bedtime)  pantoprazole 40 mg oral delayed release tablet: 1 tab(s) orally once a day (before a meal)  polyethylene glycol 3350 oral powder for reconstitution: 17 gram(s) orally once a day, As Needed for Constipation  tamsulosin 0.4 mg oral capsule: 1 cap(s) orally once a day (at bedtime)

## 2022-05-19 NOTE — HISTORY OF PRESENT ILLNESS
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 CT head at the OSH was negative for any brain bleed.  While at [place] he was found to have severe AS and CT Angio.  He was then transferred to [facility] for cardiac cath which revealed severe prox and mid-distal LAD disease. He was transferred to [facility] under the care of Dr. Tirado for further management. On 3/4/22 he underwent a BAV/ PCI, EF normal. Intraop course complicated by bleeding after removal of left groin femoral sheath and expanding hematoma. Vascular surgery was consulted intra procedure. 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 Blood flow resolved after inflation of balloon. Intra op course also notable for chun placement with drainage of 3L urine. Post operatively he was brought to [facility] in stable condition, extubated.  He was briefly hypotensive post procedure and was started on levo. POD1 he woke up and complained of inability to move his LLE. Sensation remained in tact. Stroke code was called, CT scan negative, MRI revealed acute cerebellar microemboli.  POD2 CTA abdomen/ pelvis was negative for hematoma/ compression. MRI lumbar spine was unchanged. POD3-4 movement in LLE improved. POD5 transferred to [facility]. The patient was discharged to acute rehab on POD6, 3/10/22.\par \par Since discharge, the patient states, from a neurological standpoint, he is back to his baseline with no complaints. \par \par From a cardiac standpoint,  the patient is complaining of worsening dyspnea upon exertion.\par \par

## 2022-05-19 NOTE — DISCHARGE NOTE PROVIDER - HOSPITAL COURSE
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 He ultimately was discharged to acute rehab on 3/10. On 5/17 he represented to [facility] for scheduled TAVR procedure. He underwent an uncomplicated TAVR (Bhavya valve). Post operatively he was brought to [facility] in stable condition as [facility] ICU care. POD1 episode of hypotension when getting out of bed, resolved after fluid resuscitation. POD2 per Dr. Tirado, patient is safe and ready for discharge to rehab with MCOT device. MCOT was explained to patient and placed prior to discharge. On day of discharge patient was urinating without issue, having regular bowel movements, and having trouble walking necessitating rehab placement.     Over 35 minutes was spent with the patient reviewing the discharge material including medications, follow up appointments, recovery, concerning symptoms, and how to contact their health care providers if they have questions

## 2022-05-19 NOTE — DISCHARGE NOTE NURSING/CASE MANAGEMENT/SOCIAL WORK - PATIENT PORTAL LINK FT
You can access the FollowMyHealth Patient Portal offered by [facility] by registering at the following website: [email or web address]. By joining [facility]’s FollowMyHealth portal, you will also be able to view your health information using other applications (apps) compatible with our system.

## 2022-05-19 NOTE — DISCHARGE NOTE PROVIDER - PROVIDER TOKENS
PROVIDER:[TOKEN:[[id number]],FOLLOWUP:[1 week]],PROVIDER:[TOKEN:[[id number]],FOLLOWUP:[2 weeks]]

## 2022-05-20 ENCOUNTER — TRANSCRIPTION ENCOUNTER (OUTPATIENT)
Age: 77
End: 2022-05-20

## 2022-05-23 DIAGNOSIS — I50.32 CHRONIC DIASTOLIC (CONGESTIVE) HEART FAILURE: ICD-10-CM

## 2022-05-23 DIAGNOSIS — Z79.02 LONG TERM (CURRENT) USE OF ANTITHROMBOTICS/ANTIPLATELETS: ICD-10-CM

## 2022-05-23 DIAGNOSIS — Z79.82 LONG TERM (CURRENT) USE OF ASPIRIN: ICD-10-CM

## 2022-05-23 DIAGNOSIS — E78.5 HYPERLIPIDEMIA, UNSPECIFIED: ICD-10-CM

## 2022-05-23 DIAGNOSIS — Z95.5 PRESENCE OF CORONARY ANGIOPLASTY IMPLANT AND GRAFT: ICD-10-CM

## 2022-05-23 DIAGNOSIS — I11.0 HYPERTENSIVE HEART DISEASE WITH HEART FAILURE: ICD-10-CM

## 2022-05-23 DIAGNOSIS — M19.90 UNSPECIFIED OSTEOARTHRITIS, UNSPECIFIED SITE: ICD-10-CM

## 2022-05-23 DIAGNOSIS — I25.10 ATHEROSCLEROTIC HEART DISEASE OF NATIVE CORONARY ARTERY WITHOUT ANGINA PECTORIS: ICD-10-CM

## 2022-05-23 DIAGNOSIS — E11.9 TYPE 2 DIABETES MELLITUS WITHOUT COMPLICATIONS: ICD-10-CM

## 2022-05-23 DIAGNOSIS — Z79.84 LONG TERM (CURRENT) USE OF ORAL HYPOGLYCEMIC DRUGS: ICD-10-CM

## 2022-05-23 DIAGNOSIS — Z87.891 PERSONAL HISTORY OF NICOTINE DEPENDENCE: ICD-10-CM

## 2022-05-23 DIAGNOSIS — Z85.46 PERSONAL HISTORY OF MALIGNANT NEOPLASM OF PROSTATE: ICD-10-CM
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
## 2022-05-23 DIAGNOSIS — Z99.89 DEPENDENCE ON OTHER ENABLING MACHINES AND DEVICES: ICD-10-CM

## 2022-05-23 DIAGNOSIS — I44.7 LEFT BUNDLE-BRANCH BLOCK, UNSPECIFIED: ICD-10-CM

## 2022-05-23 DIAGNOSIS — G11.11 FRIEDREICH ATAXIA: ICD-10-CM

## 2022-05-23 DIAGNOSIS — I35.0 NONRHEUMATIC AORTIC (VALVE) STENOSIS: ICD-10-CM

## 2022-05-23 DIAGNOSIS — I95.81 POSTPROCEDURAL HYPOTENSION: ICD-10-CM

## 2022-05-23 DIAGNOSIS — G47.33 OBSTRUCTIVE SLEEP APNEA (ADULT) (PEDIATRIC): ICD-10-CM

## 2022-05-25 ENCOUNTER — TRANSCRIPTION ENCOUNTER (OUTPATIENT)
Age: 77
End: 2022-05-25

## 2022-05-26 DIAGNOSIS — Z00.6 ENCOUNTER FOR EXAMINATION FOR NORMAL COMPARISON AND CONTROL IN CLINICAL RESEARCH PROGRAM: ICD-10-CM

## 2022-05-27 ENCOUNTER — TRANSCRIPTION ENCOUNTER (OUTPATIENT)
Age: 77
End: 2022-05-27

## 2022-06-01 ENCOUNTER — APPOINTMENT (OUTPATIENT)
Dept: CARDIOTHORACIC SURGERY | Facility: CLINIC | Age: 77
End: 2022-06-01
Payer: MEDICARE

## 2022-06-01 DIAGNOSIS — I35.0 NONRHEUMATIC AORTIC (VALVE) STENOSIS: ICD-10-CM

## 2022-06-01 DIAGNOSIS — Z95.2 PRESENCE OF PROSTHETIC HEART VALVE: ICD-10-CM

## 2022-06-01 PROCEDURE — 99024 POSTOP FOLLOW-UP VISIT: CPT

## 2022-06-01 NOTE — REVIEW OF SYSTEMS
[Fever] : no fever [Chills] : no chills [Feeling Poorly] : not feeling poorly [Feeling Tired] : feeling tired [Negative] : Heme/Lymph

## 2022-06-01 NOTE — HISTORY OF PRESENT ILLNESS
[FreeTextEntry1] : This visit was provided via telehealth using real-time 2-way audio visual technology. The patient, CAS GREER, was [place], [address] [place] [address] , at the time of the visit. The provider was located at [address] [place] [address]. The patient, CAS GREER, Dr. Bryce Tiraod and KAREN HENDERSON all participated in the telehealth encounter. Verbal consent given on 06/01/2022 by CAS GREER. 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 \par \par Patient was discharged back to rehab facility on POD2. \par Patient reports feeling well. He is working with physical therapy daily and is regaining his strength. Still reports some fatigue, but improving. \par His Biotel was reviewed with sandra Yeboah. \par \par Patient denies chest pain, SOB, palpitations, orthopnea, dizziness, fevers or chills. \par

## 2022-06-03 ENCOUNTER — TRANSCRIPTION ENCOUNTER (OUTPATIENT)
Age: 77
End: 2022-06-03

## 2022-06-11 ENCOUNTER — TRANSCRIPTION ENCOUNTER (OUTPATIENT)
Age: 77
End: 2022-06-11

## 2022-06-13 ENCOUNTER — TRANSCRIPTION ENCOUNTER (OUTPATIENT)
Age: 77
End: 2022-06-13

## 2022-06-14 ENCOUNTER — TRANSCRIPTION ENCOUNTER (OUTPATIENT)
Age: 77
End: 2022-06-14

## 2022-06-16 ENCOUNTER — APPOINTMENT (OUTPATIENT)
Dept: CARE COORDINATION | Facility: HOME HEALTH | Age: 77
End: 2022-06-16

## 2022-06-16 ENCOUNTER — TRANSCRIPTION ENCOUNTER (OUTPATIENT)
Age: 77
End: 2022-06-16

## 2022-08-05 ENCOUNTER — TRANSCRIPTION ENCOUNTER (OUTPATIENT)
Age: 77
End: 2022-08-05

## 2023-05-04 ENCOUNTER — NON-APPOINTMENT (OUTPATIENT)
Age: 78
End: 2023-05-04

## 2023-08-29 ENCOUNTER — TRANSCRIPTION ENCOUNTER (OUTPATIENT)
Age: 78
End: 2023-08-29

## 2023-10-10 NOTE — OCCUPATIONAL THERAPY INITIAL EVALUATION ADULT - THERAPY FREQUENCY, OT EVAL
No chief complaint on file.      HPI  H/o polyps         Problem List:    Patient Active Problem List   Diagnosis    Mixed hyperlipidemia    Enlarged prostate without lower urinary tract symptoms (luts)    GERD without esophagitis    Arthritis    Physical exam    Right inguinal hernia    Typical atrial flutter    Nonrheumatic aortic valve insufficiency    Immunization due    Personal history of colonic polyps       Medical History:    Past Medical History:   Diagnosis Date    Arthritis     Colon polyps     BENIGN    Encounter for screening colonoscopy     Enlarged prostate without lower urinary tract symptoms (luts)     Fatigue     Hematuria     Hyperlipidemia     Inguinal hernia     RIGHT        Social History:    Social History     Socioeconomic History    Marital status:    Tobacco Use    Smoking status: Former     Packs/day: 1.00     Years: 7.00     Additional pack years: 0.00     Total pack years: 7.00     Types: Cigarettes     Quit date:      Years since quittin.8    Smokeless tobacco: Never   Substance and Sexual Activity    Alcohol use: Yes     Alcohol/week: 2.0 standard drinks of alcohol     Types: 2 Shots of liquor per week    Drug use: No    Sexual activity: Defer       Family History:   Family History   Problem Relation Age of Onset    Hyperlipidemia Mother     Hypertension Mother     Malig Hyperthermia Neg Hx        Surgical History:   Past Surgical History:   Procedure Laterality Date    COLONOSCOPY N/A 2017    POLYPS BIOPSY BENIGN    HERNIA REPAIR      INGUINAL HERNIA REPAIR Right 2020    Procedure: INGUINAL HERNIA REPAIR LAPAROSCOPIC;  Surgeon: Raghav Villa MD;  Location: [facility];  Service: General;  Laterality: Right;       No current facility-administered medications for this encounter.    Current Outpatient Medications:     atorvastatin (LIPITOR) 40 MG tablet, Take 1 tablet by mouth Daily., Disp: 90 tablet, Rfl: 1    Boswellia-Glucosamine-Vit D (OSTEO BI-FLEX  ONE PER DAY PO), Take  by mouth., Disp: , Rfl:     cyclobenzaprine (FLEXERIL) 10 MG tablet, Take 1 tablet by mouth Daily As Needed for Muscle Spasms., Disp: 90 tablet, Rfl: 1    diclofenac (VOLTAREN) 75 MG EC tablet, Take 1 tablet by mouth 2 (Two) Times a Day As Needed (for pain)., Disp: 180 tablet, Rfl: 1    Multiple Vitamin (MULTIVITAMIN PO), Take 1 tablet by mouth Daily. HOLD PRIOR TO SURGERY, Disp: , Rfl:     Multiple Vitamins-Minerals (PRESERVISION AREDS 2 PO), Take 1 tablet by mouth Daily. HOLD PRIOR TO SURGERY, Disp: , Rfl:     Multiple Vitamins-Minerals (ZINC PO), Take  by mouth Daily., Disp: , Rfl:     Saw Palmetto 450 MG capsule, Take  by mouth., Disp: , Rfl:     tamsulosin (FLOMAX) 0.4 MG capsule 24 hr capsule, Take 1 capsule by mouth Daily., Disp: 90 capsule, Rfl: 2    VITAMIN A PO, Take  by mouth Daily., Disp: , Rfl:     vitamin C (ASCORBIC ACID) 250 MG tablet, Take 250 mg by mouth Daily., Disp: , Rfl:     Allergies: No Known Allergies     The following portions of the patient's history were reviewed by me and updated as appropriate: review of systems, allergies, current medications, past family history, past medical history, past social history, past surgical history and problem list.    There were no vitals filed for this visit.    PHYSICAL EXAM:    CONSTITUTIONAL:  today's vital signs reviewed by me  GASTROINTESTINAL: abdomen is soft nontender nondistended with normal active bowel sounds, no masses are appreciated    Assessment/ Plan  H/o polyps    colonoscopy    Risks and benefits as well as alternatives to endoscopic evaluation were explained to the patient and they voiced understanding and wish to proceed.  These risks include but are not limited to the risk of bleeding, perforation, adverse reaction to sedation, and missed lesions.  The patient was given the opportunity to ask questions prior to the endoscopic procedure.            3-5x/week

## 2023-11-06 ENCOUNTER — APPOINTMENT (OUTPATIENT)
Dept: VASCULAR SURGERY | Facility: CLINIC | Age: 78
End: 2023-11-06
Payer: MEDICARE

## 2023-11-06 VITALS
WEIGHT: 210 LBS | HEIGHT: 72 IN | HEART RATE: 86 BPM | BODY MASS INDEX: 28.44 KG/M2 | SYSTOLIC BLOOD PRESSURE: 136 MMHG | DIASTOLIC BLOOD PRESSURE: 78 MMHG

## 2023-11-06 DIAGNOSIS — R20.8 OTHER DISTURBANCES OF SKIN SENSATION: ICD-10-CM

## 2023-11-06 PROCEDURE — 99204 OFFICE O/P NEW MOD 45 MIN: CPT

## 2023-11-06 RX ORDER — DULOXETINE HYDROCHLORIDE 20 MG/1
20 CAPSULE, DELAYED RELEASE ORAL
Refills: 0 | Status: DISCONTINUED | COMMUNITY
End: 2023-11-06

## 2023-11-06 RX ORDER — EZETIMIBE 10 MG/1
10 TABLET ORAL
Refills: 0 | Status: ACTIVE | COMMUNITY

## 2024-08-02 ENCOUNTER — TRANSCRIPTION ENCOUNTER (OUTPATIENT)
Age: 79
End: 2024-08-02

## 2024-12-19 ENCOUNTER — TRANSCRIPTION ENCOUNTER (OUTPATIENT)
Age: 79
End: 2024-12-19

## 2025-05-21 ENCOUNTER — TRANSCRIPTION ENCOUNTER (OUTPATIENT)
Age: 80
End: 2025-05-21

## (undated) DEVICE — PACING CABLE (BROWN) A/V TEMP SCREW DOWN 12FT

## (undated) DEVICE — BAND RADIAL COMPRESSION DEVICE REG 24CM

## (undated) DEVICE — CATH CARDIAC RT CUSET 601201319

## (undated) DEVICE — NDL PERCU ECHOTIP 21G X 4CM

## (undated) DEVICE — TUBING EXTENSION HI PRESSURE FLEX 48"

## (undated) DEVICE — SYR MED A2000 SYRINGE KIT ACIST REUS

## (undated) DEVICE — DRSG QUICKCLOT HEMOSTATIC 4X4 FOIL

## (undated) DEVICE — WARMING BLANKET FULL UNDERBODY

## (undated) DEVICE — MANIFOLD ANGIO AUTOMD TRNDCR

## (undated) DEVICE — SYS DEL CONTROLLER ANGIOTOUCH

## (undated) DEVICE — TUBING SUCTION NONCONDUCTIVE 6MM X 12FT

## (undated) DEVICE — ELCTR ZOLL DEFIBRILLATOR PAD NO REPLACEMENT

## (undated) DEVICE — DRAPE ULTRASOUND TRANSDUCER COVER

## (undated) DEVICE — PRIORITY PACK WITH COPILOT 20/30

## (undated) DEVICE — PACK OPEN HEART LNX

## (undated) DEVICE — PACK HYBRID LHH

## (undated) DEVICE — PACK PROC CV DRAPE

## (undated) DEVICE — CATH CV TRAY INSR ST UNIV

## (undated) DEVICE — DRAPE SMALL W ADHESIVE APERTURE